# Patient Record
Sex: MALE | Race: WHITE | NOT HISPANIC OR LATINO | Employment: FULL TIME | URBAN - METROPOLITAN AREA
[De-identification: names, ages, dates, MRNs, and addresses within clinical notes are randomized per-mention and may not be internally consistent; named-entity substitution may affect disease eponyms.]

---

## 2018-03-23 ENCOUNTER — HOSPITAL ENCOUNTER (OUTPATIENT)
Dept: FAMILY MEDICINE CLINIC | Facility: CLINIC | Age: 58
Setting detail: SPECIMEN
Discharge: HOME | End: 2018-03-23
Attending: NURSE PRACTITIONER | Admitting: NURSE PRACTITIONER

## 2018-03-23 LAB
ALBUMIN SERPL-MCNC: 4.1 G/DL (ref 3.5–4.8)
ALBUMIN/GLOB SERPL: 1.5 {RATIO} (ref 1–1.7)
ALP SERPL-CCNC: 96 IU/L (ref 32–91)
ALT SERPL-CCNC: 30 IU/L (ref 17–63)
ANION GAP SERPL CALC-SCNC: 10.7 MMOL/L (ref 10–20)
AST SERPL-CCNC: 24 IU/L (ref 15–41)
BILIRUB SERPL-MCNC: 0.9 MG/DL (ref 0.3–1.2)
BUN SERPL-MCNC: 19 MG/DL (ref 8–20)
BUN/CREAT SERPL: 14.6 (ref 6.2–20.3)
CALCIUM SERPL-MCNC: 9.5 MG/DL (ref 8.9–10.3)
CHLORIDE SERPL-SCNC: 107 MMOL/L (ref 101–111)
CHOLEST SERPL-MCNC: 133 MG/DL
CHOLEST/HDLC SERPL: 4.1 {RATIO}
CONV CO2: 28 MMOL/L (ref 22–32)
CONV LDL CHOLESTEROL DIRECT: 81 MG/DL (ref 0–100)
CONV TOTAL PROTEIN: 6.9 G/DL (ref 6.1–7.9)
CREAT UR-MCNC: 1.3 MG/DL (ref 0.7–1.2)
GLOBULIN UR ELPH-MCNC: 2.8 G/DL (ref 2.5–3.8)
GLUCOSE SERPL-MCNC: 94 MG/DL (ref 65–99)
HDLC SERPL-MCNC: 33 MG/DL
LDLC/HDLC SERPL: 2.5 {RATIO}
LIPID INTERPRETATION: ABNORMAL
POTASSIUM SERPL-SCNC: 4.7 MMOL/L (ref 3.6–5.1)
SODIUM SERPL-SCNC: 141 MMOL/L (ref 136–144)
TRIGL SERPL-MCNC: 94 MG/DL
VLDLC SERPL CALC-MCNC: 19.6 MG/DL

## 2019-01-09 ENCOUNTER — HOSPITAL ENCOUNTER (OUTPATIENT)
Dept: CARDIOLOGY | Facility: HOSPITAL | Age: 59
Discharge: HOME | End: 2019-01-09
Attending: INTERNAL MEDICINE | Admitting: INTERNAL MEDICINE

## 2019-07-29 RX ORDER — CLOPIDOGREL BISULFATE 75 MG/1
75 TABLET ORAL EVERY 24 HOURS
COMMUNITY
Start: 2019-02-11 | End: 2019-07-29 | Stop reason: SDUPTHER

## 2019-07-29 RX ORDER — CLOPIDOGREL BISULFATE 75 MG/1
75 TABLET ORAL EVERY 24 HOURS
Qty: 90 TABLET | Refills: 0 | Status: SHIPPED | OUTPATIENT
Start: 2019-07-29 | End: 2019-10-25 | Stop reason: SDUPTHER

## 2019-07-29 RX ORDER — AMLODIPINE BESYLATE 2.5 MG/1
TABLET ORAL
Qty: 30 TABLET | Refills: 4 | Status: SHIPPED | OUTPATIENT
Start: 2019-07-29 | End: 2019-11-21 | Stop reason: SDUPTHER

## 2019-10-25 RX ORDER — CLOPIDOGREL BISULFATE 75 MG/1
TABLET ORAL
Qty: 15 TABLET | Refills: 0 | Status: SHIPPED | OUTPATIENT
Start: 2019-10-25 | End: 2019-11-19 | Stop reason: SDUPTHER

## 2019-10-30 RX ORDER — LISINOPRIL 10 MG/1
TABLET ORAL
Qty: 30 TABLET | Refills: 3 | OUTPATIENT
Start: 2019-10-30

## 2019-11-01 RX ORDER — LISINOPRIL 10 MG/1
10 TABLET ORAL DAILY
Qty: 30 TABLET | Refills: 0 | Status: SHIPPED | OUTPATIENT
Start: 2019-11-01 | End: 2019-11-21 | Stop reason: SDUPTHER

## 2019-11-20 RX ORDER — CLOPIDOGREL BISULFATE 75 MG/1
TABLET ORAL
Qty: 15 TABLET | Refills: 0 | Status: SHIPPED | OUTPATIENT
Start: 2019-11-20 | End: 2019-11-21 | Stop reason: SDUPTHER

## 2019-11-21 ENCOUNTER — TELEPHONE (OUTPATIENT)
Dept: CARDIOLOGY | Facility: CLINIC | Age: 59
End: 2019-11-21

## 2019-11-21 RX ORDER — LISINOPRIL 10 MG/1
10 TABLET ORAL DAILY
Qty: 30 TABLET | Refills: 0 | Status: SHIPPED | OUTPATIENT
Start: 2019-11-21 | End: 2019-11-27 | Stop reason: SDUPTHER

## 2019-11-21 RX ORDER — CLOPIDOGREL BISULFATE 75 MG/1
75 TABLET ORAL DAILY
Qty: 30 TABLET | Refills: 0 | Status: SHIPPED | OUTPATIENT
Start: 2019-11-21 | End: 2019-11-27 | Stop reason: SDUPTHER

## 2019-11-21 RX ORDER — AMLODIPINE BESYLATE 2.5 MG/1
2.5 TABLET ORAL DAILY
Qty: 30 TABLET | Refills: 0 | Status: SHIPPED | OUTPATIENT
Start: 2019-11-21 | End: 2019-11-27 | Stop reason: SDUPTHER

## 2019-11-21 NOTE — TELEPHONE ENCOUNTER
Requesting refills on lisinopril, clopidogrel, and amlodipine. [facility]. Patient made apt 11/27/19. Advised he needs to keep apt to continue medication refills, patient understood.

## 2019-11-27 ENCOUNTER — OFFICE VISIT (OUTPATIENT)
Dept: CARDIOLOGY | Facility: CLINIC | Age: 59
End: 2019-11-27

## 2019-11-27 VITALS
HEART RATE: 86 BPM | BODY MASS INDEX: 28.06 KG/M2 | HEIGHT: 70 IN | OXYGEN SATURATION: 99 % | WEIGHT: 196 LBS | SYSTOLIC BLOOD PRESSURE: 125 MMHG | DIASTOLIC BLOOD PRESSURE: 91 MMHG

## 2019-11-27 DIAGNOSIS — I20.0 UNSTABLE ANGINA PECTORIS (HCC): Primary | ICD-10-CM

## 2019-11-27 DIAGNOSIS — I10 ESSENTIAL HYPERTENSION: ICD-10-CM

## 2019-11-27 DIAGNOSIS — I25.110 CORONARY ARTERY DISEASE INVOLVING NATIVE CORONARY ARTERY OF NATIVE HEART WITH UNSTABLE ANGINA PECTORIS (HCC): ICD-10-CM

## 2019-11-27 PROCEDURE — 99214 OFFICE O/P EST MOD 30 MIN: CPT | Performed by: INTERNAL MEDICINE

## 2019-11-27 PROCEDURE — 93000 ELECTROCARDIOGRAM COMPLETE: CPT | Performed by: INTERNAL MEDICINE

## 2019-11-27 RX ORDER — SILDENAFIL 50 MG/1
TABLET, FILM COATED ORAL DAILY PRN
Status: ON HOLD | COMMUNITY
Start: 2017-01-12 | End: 2019-12-04

## 2019-11-27 RX ORDER — AMLODIPINE BESYLATE 2.5 MG/1
2.5 TABLET ORAL DAILY
Qty: 90 TABLET | Refills: 3 | Status: SHIPPED | OUTPATIENT
Start: 2019-11-27 | End: 2019-12-10 | Stop reason: HOSPADM

## 2019-11-27 RX ORDER — ATORVASTATIN CALCIUM 20 MG/1
1 TABLET, FILM COATED ORAL
COMMUNITY
Start: 2018-01-04 | End: 2019-11-27 | Stop reason: SDUPTHER

## 2019-11-27 RX ORDER — ATORVASTATIN CALCIUM 20 MG/1
20 TABLET, FILM COATED ORAL
Qty: 90 TABLET | Refills: 3 | Status: SHIPPED | OUTPATIENT
Start: 2019-11-27 | End: 2019-12-10 | Stop reason: HOSPADM

## 2019-11-27 RX ORDER — CLOPIDOGREL BISULFATE 75 MG/1
75 TABLET ORAL DAILY
Qty: 90 TABLET | Refills: 3 | Status: SHIPPED | OUTPATIENT
Start: 2019-11-27 | End: 2020-03-20 | Stop reason: SDUPTHER

## 2019-11-27 RX ORDER — ALBUTEROL SULFATE 90 UG/1
1 AEROSOL, METERED RESPIRATORY (INHALATION) DAILY PRN
COMMUNITY
Start: 2019-01-02

## 2019-11-27 RX ORDER — LISINOPRIL 10 MG/1
10 TABLET ORAL DAILY
Qty: 90 TABLET | Refills: 3 | Status: SHIPPED | OUTPATIENT
Start: 2019-11-27 | End: 2019-12-10 | Stop reason: HOSPADM

## 2019-11-27 RX ORDER — ASPIRIN 81 MG/1
1 TABLET ORAL EVERY 24 HOURS
COMMUNITY
Start: 2019-02-11

## 2019-11-27 NOTE — PROGRESS NOTES
"    Subjective:     Encounter Date:11/27/2019      Patient ID: Duke Freire is a 58 y.o. male.    Chief Complaint: CAD ,chest pain  History of Present Illness  58-year-old white male patient who recently underwent stress Myoview January 2019 which showed fixated large inferoapical defect,  cannot rule out large ischemia   patient has strong family history of CAD    patient does have history of hypertension and dyslipidemia        Patient underwent cardiac catheterization which showed severe mid LAD disease and ostial diagonal artery disease both were stented   in limited views ostial to proximal LAD has 60-70% disease but in repeat pictures it was thought it is around 40-50%     is on aspirin Plavix statins Ace inhibitors  Patient has some problems with medication noncompliance he just restarted his medications  Blood pressure is running high started having some headache blurry vision fatigue  Also in the last couple of weeks he is having increasing dyspnea on exertion chest discomfort somewhat similar to the symptoms prior to the PCI  EKG today normal sinus rhythm normal axis no significant ST-T abnormalities  I advised stress test in the near future restart all the medications aggressive medical treatment cardiac risk factor modification  Will increase amlodipine to 5 mg every day and start low-dose beta-blockers  Past Medical History:   Diagnosis Date   • Coronary artery disease      Past Surgical History:   Procedure Laterality Date   • CORONARY ANGIOPLASTY WITH STENT PLACEMENT       /91 (BP Location: Left arm, Patient Position: Sitting, Cuff Size: Adult)   Pulse 86   Ht 177.8 cm (70\")   Wt 88.9 kg (196 lb)   SpO2 99%   BMI 28.12 kg/m²   Family History   Problem Relation Age of Onset   • Diabetes Mother    • Diabetes Sister        Current Outpatient Medications:   •  amLODIPine (NORVASC) 2.5 MG tablet, Take 1 tablet by mouth Daily., Disp: 90 tablet, Rfl: 3  •  aspirin (ASPIR-LOW) 81 MG EC tablet, " Take 1 tablet by mouth Daily., Disp: , Rfl:   •  atorvastatin (LIPITOR) 20 MG tablet, Take 1 tablet by mouth every night at bedtime., Disp: 90 tablet, Rfl: 3  •  Cetirizine HCl (ZYRTEC ALLERGY) 10 MG capsule, Take 1 capsule by mouth Daily., Disp: , Rfl:   •  clopidogrel (PLAVIX) 75 MG tablet, Take 1 tablet by mouth Daily., Disp: 90 tablet, Rfl: 3  •  lisinopril (PRINIVIL,ZESTRIL) 10 MG tablet, Take 1 tablet by mouth Daily. --needs appt for more refills, Disp: 90 tablet, Rfl: 3  •  albuterol sulfate HFA (PROAIR HFA) 108 (90 Base) MCG/ACT inhaler, Daily As Needed., Disp: , Rfl:   •  sildenafil (VIAGRA) 50 MG tablet, Daily As Needed., Disp: , Rfl:   Social History     Socioeconomic History   • Marital status:      Spouse name: Not on file   • Number of children: Not on file   • Years of education: Not on file   • Highest education level: Not on file   Tobacco Use   • Smoking status: Never Smoker   • Smokeless tobacco: Never Used     No Known Allergies  Review of Systems   Constitution: Negative for chills, fever and malaise/fatigue.   HENT: Negative for congestion and hearing loss.    Eyes: Negative for double vision and visual disturbance.   Cardiovascular: Positive for chest pain, dyspnea on exertion, leg swelling and palpitations. Negative for claudication and syncope.   Respiratory: Positive for shortness of breath. Negative for cough.    Endocrine: Negative for cold intolerance.   Skin: Negative for color change and rash.   Musculoskeletal: Negative for arthritis and joint pain.   Gastrointestinal: Negative for abdominal pain and heartburn.   Genitourinary: Negative for hematuria.   Neurological: Positive for dizziness and light-headedness. Negative for excessive daytime sleepiness and numbness.   Psychiatric/Behavioral: Negative for depression. The patient is not nervous/anxious.    All other systems reviewed and are negative.             Objective:     Physical Exam   Constitutional: He is oriented to  person, place, and time. He appears well-developed and well-nourished. He is cooperative.   HENT:   Head: Normocephalic and atraumatic.   Mouth/Throat: Uvula is midline and oropharynx is clear and moist. No oral lesions.   Eyes: Conjunctivae are normal. No scleral icterus.   Neck: Trachea normal. Neck supple. No JVD present. Carotid bruit is not present. No thyromegaly present.   Cardiovascular: Normal rate, regular rhythm, S1 normal, S2 normal, normal heart sounds, intact distal pulses and normal pulses. PMI is not displaced. Exam reveals no gallop and no friction rub.   No murmur heard.  Pulmonary/Chest: Effort normal and breath sounds normal.   Abdominal: Soft. Bowel sounds are normal.   Musculoskeletal: Normal range of motion.   Neurological: He is alert and oriented to person, place, and time. He has normal strength.   No focal deficits   Skin: Skin is warm. No cyanosis.   Psychiatric: He has a normal mood and affect.         ECG 12 Lead  Date/Time: 11/27/2019 5:58 PM  Performed by: James Hernandez MD  Authorized by: James Hernandez MD   Comments: EKG today normal sinus rhythm normal axis no significant ST-T abnormalities compared to the last EKG no significant changes            Lab Review:       Assessment:          Diagnosis Plan   1. Unstable angina pectoris (CMS/HCC)  Stress Test With Myocardial Perfusion One Day   2. Coronary artery disease involving native coronary artery of native heart with unstable angina pectoris (CMS/HCC)  Stress Test With Myocardial Perfusion One Day   3. Essential hypertension  Stress Test With Myocardial Perfusion One Day          Plan:         We will schedule a stress test and restart all his medications if patient has any worsening symptoms he will contact me or come to the ER  CAD needs aggressive medical treatment risk factor modification status post PCI  Hypertension needs to be aggressively controlled will increase amlodipine to 5 mg every day start  low-dose beta-blockers

## 2019-12-02 ENCOUNTER — TELEPHONE (OUTPATIENT)
Dept: CARDIOLOGY | Facility: CLINIC | Age: 59
End: 2019-12-02

## 2019-12-02 ENCOUNTER — HOSPITAL ENCOUNTER (OUTPATIENT)
Dept: CARDIOLOGY | Facility: HOSPITAL | Age: 59
Discharge: HOME | End: 2019-12-02

## 2019-12-02 ENCOUNTER — HOSPITAL ENCOUNTER (OUTPATIENT)
Dept: CARDIOLOGY | Facility: HOSPITAL | Age: 59
Discharge: HOME | End: 2019-12-02
Admitting: INTERNAL MEDICINE

## 2019-12-02 DIAGNOSIS — I25.110 CORONARY ARTERY DISEASE INVOLVING NATIVE CORONARY ARTERY OF NATIVE HEART WITH UNSTABLE ANGINA PECTORIS (HCC): ICD-10-CM

## 2019-12-02 DIAGNOSIS — I10 ESSENTIAL HYPERTENSION: ICD-10-CM

## 2019-12-02 DIAGNOSIS — I20.9 ANGINA PECTORIS (HCC): ICD-10-CM

## 2019-12-02 DIAGNOSIS — R94.39 ABNORMAL NUCLEAR STRESS TEST: Primary | ICD-10-CM

## 2019-12-02 DIAGNOSIS — I20.0 UNSTABLE ANGINA PECTORIS (HCC): ICD-10-CM

## 2019-12-02 LAB
BH CV STRESS COMMENTS STAGE 1: NORMAL
BH CV STRESS DOSE REGADENOSON STAGE 1: 0.4
BH CV STRESS DURATION MIN STAGE 1: 0
BH CV STRESS DURATION SEC STAGE 1: 10
BH CV STRESS PROTOCOL 1: NORMAL
BH CV STRESS RECOVERY BP: NORMAL MMHG
BH CV STRESS RECOVERY HR: 82 BPM
BH CV STRESS STAGE 1: 1
LV EF NUC BP: 58 %
MAXIMAL PREDICTED HEART RATE: 163 BPM
STRESS BASELINE BP: NORMAL MMHG
STRESS BASELINE HR: 52 BPM
STRESS TARGET HR: 139 BPM

## 2019-12-02 PROCEDURE — 0 TECHNETIUM SESTAMIBI: Performed by: INTERNAL MEDICINE

## 2019-12-02 PROCEDURE — 78452 HT MUSCLE IMAGE SPECT MULT: CPT

## 2019-12-02 PROCEDURE — 93018 CV STRESS TEST I&R ONLY: CPT | Performed by: INTERNAL MEDICINE

## 2019-12-02 PROCEDURE — A9500 TC99M SESTAMIBI: HCPCS | Performed by: INTERNAL MEDICINE

## 2019-12-02 PROCEDURE — 25010000002 REGADENOSON 0.4 MG/5ML SOLUTION: Performed by: INTERNAL MEDICINE

## 2019-12-02 PROCEDURE — 93016 CV STRESS TEST SUPVJ ONLY: CPT | Performed by: INTERNAL MEDICINE

## 2019-12-02 PROCEDURE — 93017 CV STRESS TEST TRACING ONLY: CPT

## 2019-12-02 PROCEDURE — 78452 HT MUSCLE IMAGE SPECT MULT: CPT | Performed by: INTERNAL MEDICINE

## 2019-12-02 RX ADMIN — REGADENOSON 0.4 MG: 0.08 INJECTION, SOLUTION INTRAVENOUS at 10:45

## 2019-12-02 RX ADMIN — TECHNETIUM TC 99M SESTAMIBI 1 DOSE: 1 INJECTION INTRAVENOUS at 09:45

## 2019-12-02 NOTE — TELEPHONE ENCOUNTER
Dr. Leal has scheduled the patient for a cath on the 4th at 5:30. He would like him to arrive at 3:30 for a lab. Please contact patient with instructions.

## 2019-12-04 ENCOUNTER — LAB (OUTPATIENT)
Dept: LAB | Facility: HOSPITAL | Age: 59
End: 2019-12-04

## 2019-12-04 ENCOUNTER — HOSPITAL ENCOUNTER (OUTPATIENT)
Dept: CARDIOLOGY | Facility: HOSPITAL | Age: 59
Discharge: HOME | End: 2019-12-04

## 2019-12-04 ENCOUNTER — HOSPITAL ENCOUNTER (INPATIENT)
Facility: HOSPITAL | Age: 59
LOS: 5 days | Discharge: HOME | End: 2019-12-10
Attending: INTERNAL MEDICINE | Admitting: INTERNAL MEDICINE

## 2019-12-04 DIAGNOSIS — I25.110 CORONARY ARTERY DISEASE INVOLVING NATIVE CORONARY ARTERY OF NATIVE HEART WITH UNSTABLE ANGINA PECTORIS (HCC): ICD-10-CM

## 2019-12-04 DIAGNOSIS — N17.9 ACUTE RENAL FAILURE, UNSPECIFIED ACUTE RENAL FAILURE TYPE (HCC): Primary | ICD-10-CM

## 2019-12-04 DIAGNOSIS — R94.39 ABNORMAL NUCLEAR STRESS TEST: ICD-10-CM

## 2019-12-04 DIAGNOSIS — I20.9 ANGINA PECTORIS (HCC): ICD-10-CM

## 2019-12-04 DIAGNOSIS — Z95.1 S/P CABG X 2: ICD-10-CM

## 2019-12-04 DIAGNOSIS — I10 ESSENTIAL HYPERTENSION: ICD-10-CM

## 2019-12-04 LAB
ANION GAP SERPL CALCULATED.3IONS-SCNC: 8 MMOL/L (ref 5–15)
BUN BLD-MCNC: 25 MG/DL (ref 6–20)
BUN/CREAT SERPL: 17.5 (ref 7–25)
CALCIUM SPEC-SCNC: 9.4 MG/DL (ref 8.6–10.5)
CHLORIDE SERPL-SCNC: 105 MMOL/L (ref 98–107)
CO2 SERPL-SCNC: 29 MMOL/L (ref 22–29)
CREAT BLD-MCNC: 1.43 MG/DL (ref 0.76–1.27)
DEPRECATED RDW RBC AUTO: 44.2 FL (ref 37–54)
ERYTHROCYTE [DISTWIDTH] IN BLOOD BY AUTOMATED COUNT: 13.5 % (ref 12.3–15.4)
GFR SERPL CREATININE-BSD FRML MDRD: 51 ML/MIN/1.73
GLUCOSE BLD-MCNC: 96 MG/DL (ref 65–99)
HCT VFR BLD AUTO: 39.5 % (ref 37.5–51)
HGB BLD-MCNC: 13.9 G/DL (ref 13–17.7)
INR PPP: 1.1 (ref 0.9–1.1)
MCH RBC QN AUTO: 32.7 PG (ref 26.6–33)
MCHC RBC AUTO-ENTMCNC: 35.2 G/DL (ref 31.5–35.7)
MCV RBC AUTO: 93 FL (ref 79–97)
PLATELET # BLD AUTO: 216 10*3/MM3 (ref 140–450)
PMV BLD AUTO: 7.6 FL (ref 6–12)
POTASSIUM BLD-SCNC: 4.6 MMOL/L (ref 3.5–5.2)
PROTHROMBIN TIME: 11.3 SECONDS (ref 9.6–11.7)
RBC # BLD AUTO: 4.24 10*6/MM3 (ref 4.14–5.8)
SODIUM BLD-SCNC: 142 MMOL/L (ref 136–145)
WBC NRBC COR # BLD: 6 10*3/MM3 (ref 3.4–10.8)

## 2019-12-04 PROCEDURE — C1894 INTRO/SHEATH, NON-LASER: HCPCS | Performed by: INTERNAL MEDICINE

## 2019-12-04 PROCEDURE — 25010000002 MIDAZOLAM PER 1 MG: Performed by: INTERNAL MEDICINE

## 2019-12-04 PROCEDURE — 4A023N7 MEASUREMENT OF CARDIAC SAMPLING AND PRESSURE, LEFT HEART, PERCUTANEOUS APPROACH: ICD-10-PCS | Performed by: INTERNAL MEDICINE

## 2019-12-04 PROCEDURE — 85610 PROTHROMBIN TIME: CPT

## 2019-12-04 PROCEDURE — 93458 L HRT ARTERY/VENTRICLE ANGIO: CPT | Performed by: INTERNAL MEDICINE

## 2019-12-04 PROCEDURE — 93005 ELECTROCARDIOGRAM TRACING: CPT | Performed by: INTERNAL MEDICINE

## 2019-12-04 PROCEDURE — 25010000002 FENTANYL CITRATE (PF) 100 MCG/2ML SOLUTION: Performed by: INTERNAL MEDICINE

## 2019-12-04 PROCEDURE — 0 IOPAMIDOL PER 1 ML: Performed by: INTERNAL MEDICINE

## 2019-12-04 PROCEDURE — C1769 GUIDE WIRE: HCPCS | Performed by: INTERNAL MEDICINE

## 2019-12-04 PROCEDURE — 36415 COLL VENOUS BLD VENIPUNCTURE: CPT

## 2019-12-04 PROCEDURE — 83036 HEMOGLOBIN GLYCOSYLATED A1C: CPT | Performed by: NURSE PRACTITIONER

## 2019-12-04 PROCEDURE — 99152 MOD SED SAME PHYS/QHP 5/>YRS: CPT | Performed by: INTERNAL MEDICINE

## 2019-12-04 PROCEDURE — 99024 POSTOP FOLLOW-UP VISIT: CPT | Performed by: THORACIC SURGERY (CARDIOTHORACIC VASCULAR SURGERY)

## 2019-12-04 PROCEDURE — B2111ZZ FLUOROSCOPY OF MULTIPLE CORONARY ARTERIES USING LOW OSMOLAR CONTRAST: ICD-10-PCS | Performed by: INTERNAL MEDICINE

## 2019-12-04 PROCEDURE — 25010000002 ONDANSETRON PER 1 MG: Performed by: INTERNAL MEDICINE

## 2019-12-04 PROCEDURE — 85027 COMPLETE CBC AUTOMATED: CPT

## 2019-12-04 PROCEDURE — 99153 MOD SED SAME PHYS/QHP EA: CPT | Performed by: INTERNAL MEDICINE

## 2019-12-04 PROCEDURE — 80048 BASIC METABOLIC PNL TOTAL CA: CPT

## 2019-12-04 RX ORDER — METOPROLOL SUCCINATE 25 MG/1
25 TABLET, EXTENDED RELEASE ORAL NIGHTLY
COMMUNITY
End: 2019-12-10 | Stop reason: HOSPADM

## 2019-12-04 RX ORDER — ATORVASTATIN CALCIUM 20 MG/1
20 TABLET, FILM COATED ORAL
Qty: 90 TABLET | Refills: 3 | OUTPATIENT
Start: 2019-12-04

## 2019-12-04 RX ORDER — LIDOCAINE HYDROCHLORIDE 20 MG/ML
INJECTION, SOLUTION INFILTRATION; PERINEURAL AS NEEDED
Status: DISCONTINUED | OUTPATIENT
Start: 2019-12-04 | End: 2019-12-04 | Stop reason: HOSPADM

## 2019-12-04 RX ORDER — FENTANYL CITRATE 50 UG/ML
INJECTION, SOLUTION INTRAMUSCULAR; INTRAVENOUS AS NEEDED
Status: DISCONTINUED | OUTPATIENT
Start: 2019-12-04 | End: 2019-12-04 | Stop reason: HOSPADM

## 2019-12-04 RX ORDER — MIDAZOLAM HYDROCHLORIDE 1 MG/ML
INJECTION INTRAMUSCULAR; INTRAVENOUS AS NEEDED
Status: DISCONTINUED | OUTPATIENT
Start: 2019-12-04 | End: 2019-12-04 | Stop reason: HOSPADM

## 2019-12-04 RX ORDER — ONDANSETRON 2 MG/ML
INJECTION INTRAMUSCULAR; INTRAVENOUS AS NEEDED
Status: DISCONTINUED | OUTPATIENT
Start: 2019-12-04 | End: 2019-12-04 | Stop reason: HOSPADM

## 2019-12-05 ENCOUNTER — HOSPITAL ENCOUNTER (OUTPATIENT)
Dept: CARDIOLOGY | Facility: HOSPITAL | Age: 59
Setting detail: OBSERVATION
End: 2019-12-05

## 2019-12-05 ENCOUNTER — APPOINTMENT (OUTPATIENT)
Dept: GENERAL RADIOLOGY | Facility: HOSPITAL | Age: 59
End: 2019-12-05

## 2019-12-05 ENCOUNTER — HOSPITAL ENCOUNTER (OUTPATIENT)
Dept: CARDIOLOGY | Facility: HOSPITAL | Age: 59
Setting detail: OBSERVATION
Discharge: HOME | End: 2019-12-05

## 2019-12-05 ENCOUNTER — APPOINTMENT (OUTPATIENT)
Dept: RESPIRATORY THERAPY | Facility: HOSPITAL | Age: 59
End: 2019-12-05

## 2019-12-05 LAB
ABO GROUP BLD: NORMAL
ALBUMIN SERPL-MCNC: 3.9 G/DL (ref 3.5–5.2)
ALBUMIN/GLOB SERPL: 1.5 G/DL
ALP SERPL-CCNC: 91 U/L (ref 39–117)
ALT SERPL W P-5'-P-CCNC: 21 U/L (ref 1–41)
ANION GAP SERPL CALCULATED.3IONS-SCNC: 9 MMOL/L (ref 5–15)
APTT PPP: 30.1 SECONDS (ref 24–31)
AST SERPL-CCNC: 17 U/L (ref 1–40)
BASOPHILS # BLD AUTO: 0 10*3/MM3 (ref 0–0.2)
BASOPHILS NFR BLD AUTO: 0.7 % (ref 0–1.5)
BH CV XLRA MEAS - DIST GSV CALF DIST LEFT: 0.23 CM
BH CV XLRA MEAS - DIST GSV CALF DIST RIGHT: 0.2 CM
BH CV XLRA MEAS - DIST GSV THIGH DIST LEFT: 0.47 CM
BH CV XLRA MEAS - DIST GSV THIGH DIST RIGHT: 0.36 CM
BH CV XLRA MEAS - MID GSV CALF LEFT: 0.22 CM
BH CV XLRA MEAS - MID GSV CALF RIGHT: 0.22 CM
BH CV XLRA MEAS - MID GSV THIGH  LEFT: 0.43 CM
BH CV XLRA MEAS - MID GSV THIGH  RIGHT: 0.32 CM
BH CV XLRA MEAS - PROX GSV CALF DIST LEFT: 0.28 CM
BH CV XLRA MEAS - PROX GSV CALF DIST RIGHT: 0.25 CM
BH CV XLRA MEAS - PROX GSV THIGH  LEFT: 0.44 CM
BH CV XLRA MEAS - PROX GSV THIGH  RIGHT: 0.41 CM
BH CV XLRA MEAS LEFT DIST CCA EDV: -30.4 CM/SEC
BH CV XLRA MEAS LEFT DIST CCA PSV: -101 CM/SEC
BH CV XLRA MEAS LEFT DIST ICA EDV: -32 CM/SEC
BH CV XLRA MEAS LEFT DIST ICA PSV: -92.6 CM/SEC
BH CV XLRA MEAS LEFT ICA/CCA RATIO: 0.9
BH CV XLRA MEAS LEFT PROX CCA EDV: 24.9 CM/SEC
BH CV XLRA MEAS LEFT PROX CCA PSV: 104 CM/SEC
BH CV XLRA MEAS LEFT PROX ECA EDV: -12.4 CM/SEC
BH CV XLRA MEAS LEFT PROX ECA PSV: -98.8 CM/SEC
BH CV XLRA MEAS LEFT PROX ICA EDV: -23.7 CM/SEC
BH CV XLRA MEAS LEFT PROX ICA PSV: -79.9 CM/SEC
BH CV XLRA MEAS LEFT PROX SCLA PSV: 99.4 CM/SEC
BH CV XLRA MEAS LEFT VERTEBRAL A EDV: 10.1 CM/SEC
BH CV XLRA MEAS LEFT VERTEBRAL A PSV: 49.5 CM/SEC
BH CV XLRA MEAS RIGHT BULB EDV: -25.5 CM/SEC
BH CV XLRA MEAS RIGHT BULB PSV: -82 CM/SEC
BH CV XLRA MEAS RIGHT DIST CCA EDV: 22 CM/SEC
BH CV XLRA MEAS RIGHT DIST CCA PSV: 71.1 CM/SEC
BH CV XLRA MEAS RIGHT DIST ICA EDV: -31.5 CM/SEC
BH CV XLRA MEAS RIGHT DIST ICA PSV: -75.7 CM/SEC
BH CV XLRA MEAS RIGHT ICA/CCA RATIO: 1
BH CV XLRA MEAS RIGHT PROX CCA EDV: 29.4 CM/SEC
BH CV XLRA MEAS RIGHT PROX CCA PSV: 88.7 CM/SEC
BH CV XLRA MEAS RIGHT PROX ECA EDV: -18.2 CM/SEC
BH CV XLRA MEAS RIGHT PROX ECA PSV: -89.9 CM/SEC
BH CV XLRA MEAS RIGHT PROX ICA EDV: -37.8 CM/SEC
BH CV XLRA MEAS RIGHT PROX ICA PSV: -91.4 CM/SEC
BH CV XLRA MEAS RIGHT PROX SCLA PSV: 96.9 CM/SEC
BH CV XLRA MEAS RIGHT VERTEBRAL A EDV: -10.7 CM/SEC
BH CV XLRA MEAS RIGHT VERTEBRAL A PSV: -42.6 CM/SEC
BILIRUB SERPL-MCNC: 0.5 MG/DL (ref 0.2–1.2)
BILIRUB UR QL STRIP: NEGATIVE
BLD GP AB SCN SERPL QL: NEGATIVE
BUN BLD-MCNC: 22 MG/DL (ref 6–20)
BUN/CREAT SERPL: 20 (ref 7–25)
CALCIUM SPEC-SCNC: 8.9 MG/DL (ref 8.6–10.5)
CHLORIDE SERPL-SCNC: 106 MMOL/L (ref 98–107)
CHOLEST SERPL-MCNC: 152 MG/DL (ref 0–200)
CK SERPL-CCNC: 63 U/L (ref 20–200)
CLARITY UR: CLEAR
CLOSE TME COLL+ADP + EPINEP PNL BLD: 53 % (ref 86–100)
CO2 SERPL-SCNC: 25 MMOL/L (ref 22–29)
COLOR UR: YELLOW
CREAT BLD-MCNC: 1.1 MG/DL (ref 0.76–1.27)
DEPRECATED RDW RBC AUTO: 44.2 FL (ref 37–54)
EOSINOPHIL # BLD AUTO: 0.1 10*3/MM3 (ref 0–0.4)
EOSINOPHIL NFR BLD AUTO: 1.7 % (ref 0.3–6.2)
EOSINOPHIL SPEC QL MICRO: 0 % EOS/100 CELLS (ref 0–0)
ERYTHROCYTE [DISTWIDTH] IN BLOOD BY AUTOMATED COUNT: 13.4 % (ref 12.3–15.4)
GFR SERPL CREATININE-BSD FRML MDRD: 69 ML/MIN/1.73
GLOBULIN UR ELPH-MCNC: 2.6 GM/DL
GLUCOSE BLD-MCNC: 94 MG/DL (ref 65–99)
GLUCOSE BLDC GLUCOMTR-MCNC: 86 MG/DL (ref 70–105)
GLUCOSE UR STRIP-MCNC: NEGATIVE MG/DL
HBA1C MFR BLD: 4.5 % (ref 3.5–5.6)
HCT VFR BLD AUTO: 40.2 % (ref 37.5–51)
HDLC SERPL-MCNC: 32 MG/DL (ref 40–60)
HGB BLD-MCNC: 14.5 G/DL (ref 13–17.7)
HGB UR QL STRIP.AUTO: NEGATIVE
INR PPP: 1.06 (ref 0.9–1.1)
KETONES UR QL STRIP: NEGATIVE
LDLC SERPL CALC-MCNC: 90 MG/DL (ref 0–100)
LDLC/HDLC SERPL: 2.81 {RATIO}
LEUKOCYTE ESTERASE UR QL STRIP.AUTO: NEGATIVE
LYMPHOCYTES # BLD AUTO: 1.7 10*3/MM3 (ref 0.7–3.1)
LYMPHOCYTES NFR BLD AUTO: 25.4 % (ref 19.6–45.3)
MCH RBC QN AUTO: 33.5 PG (ref 26.6–33)
MCHC RBC AUTO-ENTMCNC: 36.1 G/DL (ref 31.5–35.7)
MCV RBC AUTO: 92.7 FL (ref 79–97)
MONOCYTES # BLD AUTO: 0.4 10*3/MM3 (ref 0.1–0.9)
MONOCYTES NFR BLD AUTO: 6.1 % (ref 5–12)
NEUTROPHILS # BLD AUTO: 4.3 10*3/MM3 (ref 1.7–7)
NEUTROPHILS NFR BLD AUTO: 66.1 % (ref 42.7–76)
NITRITE UR QL STRIP: NEGATIVE
NRBC BLD AUTO-RTO: 0 /100 WBC (ref 0–0.2)
PH UR STRIP.AUTO: 6.5 [PH] (ref 5–8)
PLATELET # BLD AUTO: 203 10*3/MM3 (ref 140–450)
PMV BLD AUTO: 8.1 FL (ref 6–12)
POTASSIUM BLD-SCNC: 4.6 MMOL/L (ref 3.5–5.2)
PROT SERPL-MCNC: 6.5 G/DL (ref 6–8.5)
PROT UR QL STRIP: NEGATIVE
PROTHROMBIN TIME: 11 SECONDS (ref 9.6–11.7)
RBC # BLD AUTO: 4.33 10*6/MM3 (ref 4.14–5.8)
RH BLD: POSITIVE
SODIUM BLD-SCNC: 140 MMOL/L (ref 136–145)
SODIUM UR-SCNC: 94 MMOL/L
SP GR UR STRIP: 1.01 (ref 1–1.03)
T&S EXPIRATION DATE: NORMAL
TRIGL SERPL-MCNC: 150 MG/DL (ref 0–150)
URATE SERPL-MCNC: 5 MG/DL (ref 3.4–7)
UROBILINOGEN UR QL STRIP: NORMAL
VLDLC SERPL-MCNC: 30 MG/DL
WBC NRBC COR # BLD: 6.5 10*3/MM3 (ref 3.4–10.8)

## 2019-12-05 PROCEDURE — 94010 BREATHING CAPACITY TEST: CPT

## 2019-12-05 PROCEDURE — 84550 ASSAY OF BLOOD/URIC ACID: CPT | Performed by: INTERNAL MEDICINE

## 2019-12-05 PROCEDURE — 87205 SMEAR GRAM STAIN: CPT | Performed by: INTERNAL MEDICINE

## 2019-12-05 PROCEDURE — 80061 LIPID PANEL: CPT | Performed by: NURSE PRACTITIONER

## 2019-12-05 PROCEDURE — 86923 COMPATIBILITY TEST ELECTRIC: CPT

## 2019-12-05 PROCEDURE — 85610 PROTHROMBIN TIME: CPT | Performed by: NURSE PRACTITIONER

## 2019-12-05 PROCEDURE — G0378 HOSPITAL OBSERVATION PER HR: HCPCS

## 2019-12-05 PROCEDURE — 99232 SBSQ HOSP IP/OBS MODERATE 35: CPT | Performed by: INTERNAL MEDICINE

## 2019-12-05 PROCEDURE — 86900 BLOOD TYPING SEROLOGIC ABO: CPT

## 2019-12-05 PROCEDURE — 85025 COMPLETE CBC W/AUTO DIFF WBC: CPT | Performed by: NURSE PRACTITIONER

## 2019-12-05 PROCEDURE — 84300 ASSAY OF URINE SODIUM: CPT | Performed by: INTERNAL MEDICINE

## 2019-12-05 PROCEDURE — 85730 THROMBOPLASTIN TIME PARTIAL: CPT | Performed by: NURSE PRACTITIONER

## 2019-12-05 PROCEDURE — 94727 GAS DIL/WSHOT DETER LNG VOL: CPT

## 2019-12-05 PROCEDURE — 85576 BLOOD PLATELET AGGREGATION: CPT | Performed by: THORACIC SURGERY (CARDIOTHORACIC VASCULAR SURGERY)

## 2019-12-05 PROCEDURE — 86901 BLOOD TYPING SEROLOGIC RH(D): CPT | Performed by: NURSE PRACTITIONER

## 2019-12-05 PROCEDURE — 80053 COMPREHEN METABOLIC PANEL: CPT | Performed by: THORACIC SURGERY (CARDIOTHORACIC VASCULAR SURGERY)

## 2019-12-05 PROCEDURE — 86850 RBC ANTIBODY SCREEN: CPT | Performed by: NURSE PRACTITIONER

## 2019-12-05 PROCEDURE — 86901 BLOOD TYPING SEROLOGIC RH(D): CPT

## 2019-12-05 PROCEDURE — 87081 CULTURE SCREEN ONLY: CPT | Performed by: THORACIC SURGERY (CARDIOTHORACIC VASCULAR SURGERY)

## 2019-12-05 PROCEDURE — 99254 IP/OBS CNSLTJ NEW/EST MOD 60: CPT | Performed by: NURSE PRACTITIONER

## 2019-12-05 PROCEDURE — 82962 GLUCOSE BLOOD TEST: CPT

## 2019-12-05 PROCEDURE — 82550 ASSAY OF CK (CPK): CPT | Performed by: INTERNAL MEDICINE

## 2019-12-05 PROCEDURE — 94729 DIFFUSING CAPACITY: CPT

## 2019-12-05 PROCEDURE — 93970 EXTREMITY STUDY: CPT

## 2019-12-05 PROCEDURE — 86900 BLOOD TYPING SEROLOGIC ABO: CPT | Performed by: NURSE PRACTITIONER

## 2019-12-05 PROCEDURE — 93880 EXTRACRANIAL BILAT STUDY: CPT

## 2019-12-05 PROCEDURE — 81003 URINALYSIS AUTO W/O SCOPE: CPT | Performed by: NURSE PRACTITIONER

## 2019-12-05 PROCEDURE — 71045 X-RAY EXAM CHEST 1 VIEW: CPT

## 2019-12-05 RX ORDER — ALPRAZOLAM 0.25 MG/1
0.25 TABLET ORAL ONCE
Status: COMPLETED | OUTPATIENT
Start: 2019-12-05 | End: 2019-12-05

## 2019-12-05 RX ORDER — CHLORHEXIDINE GLUCONATE 500 MG/1
1 CLOTH TOPICAL EVERY 12 HOURS
Status: COMPLETED | OUTPATIENT
Start: 2019-12-05 | End: 2019-12-06

## 2019-12-05 RX ORDER — DIPHENHYDRAMINE HCL 25 MG
25 TABLET ORAL EVERY 6 HOURS PRN
Status: DISCONTINUED | OUTPATIENT
Start: 2019-12-05 | End: 2019-12-06 | Stop reason: HOSPADM

## 2019-12-05 RX ORDER — SODIUM CHLORIDE 9 MG/ML
60 INJECTION, SOLUTION INTRAVENOUS CONTINUOUS
Status: DISCONTINUED | OUTPATIENT
Start: 2019-12-05 | End: 2019-12-06

## 2019-12-05 RX ORDER — HYDRALAZINE HYDROCHLORIDE 20 MG/ML
10 INJECTION INTRAMUSCULAR; INTRAVENOUS EVERY 6 HOURS PRN
Status: DISCONTINUED | OUTPATIENT
Start: 2019-12-05 | End: 2019-12-06 | Stop reason: HOSPADM

## 2019-12-05 RX ORDER — ATORVASTATIN CALCIUM 20 MG/1
20 TABLET, FILM COATED ORAL DAILY
Status: DISCONTINUED | OUTPATIENT
Start: 2019-12-05 | End: 2019-12-06 | Stop reason: HOSPADM

## 2019-12-05 RX ORDER — ONDANSETRON 4 MG/1
4 TABLET, FILM COATED ORAL EVERY 6 HOURS PRN
Status: DISCONTINUED | OUTPATIENT
Start: 2019-12-05 | End: 2019-12-06 | Stop reason: HOSPADM

## 2019-12-05 RX ORDER — ALUMINA, MAGNESIA, AND SIMETHICONE 2400; 2400; 240 MG/30ML; MG/30ML; MG/30ML
15 SUSPENSION ORAL EVERY 6 HOURS PRN
Status: DISCONTINUED | OUTPATIENT
Start: 2019-12-05 | End: 2019-12-05

## 2019-12-05 RX ORDER — ATROPINE SULFATE 1 MG/ML
.5-1 INJECTION, SOLUTION INTRAMUSCULAR; INTRAVENOUS; SUBCUTANEOUS
Status: DISCONTINUED | OUTPATIENT
Start: 2019-12-05 | End: 2019-12-06 | Stop reason: HOSPADM

## 2019-12-05 RX ORDER — CHLORHEXIDINE GLUCONATE 0.12 MG/ML
15 RINSE ORAL EVERY 12 HOURS SCHEDULED
Status: COMPLETED | OUTPATIENT
Start: 2019-12-05 | End: 2019-12-06

## 2019-12-05 RX ORDER — ASPIRIN 81 MG/1
81 TABLET ORAL DAILY
Status: DISCONTINUED | OUTPATIENT
Start: 2019-12-05 | End: 2019-12-06 | Stop reason: HOSPADM

## 2019-12-05 RX ORDER — AMLODIPINE BESYLATE 5 MG/1
5 TABLET ORAL DAILY
Status: DISCONTINUED | OUTPATIENT
Start: 2019-12-05 | End: 2019-12-06

## 2019-12-05 RX ORDER — ACETAMINOPHEN 325 MG/1
650 TABLET ORAL EVERY 4 HOURS PRN
Status: DISCONTINUED | OUTPATIENT
Start: 2019-12-05 | End: 2019-12-06 | Stop reason: HOSPADM

## 2019-12-05 RX ORDER — SODIUM CHLORIDE 9 MG/ML
250 INJECTION, SOLUTION INTRAVENOUS ONCE AS NEEDED
Status: COMPLETED | OUTPATIENT
Start: 2019-12-05 | End: 2019-12-06

## 2019-12-05 RX ORDER — ONDANSETRON 2 MG/ML
4 INJECTION INTRAMUSCULAR; INTRAVENOUS EVERY 6 HOURS PRN
Status: DISCONTINUED | OUTPATIENT
Start: 2019-12-05 | End: 2019-12-06 | Stop reason: HOSPADM

## 2019-12-05 RX ORDER — METOPROLOL SUCCINATE 25 MG/1
25 TABLET, EXTENDED RELEASE ORAL NIGHTLY
Status: DISCONTINUED | OUTPATIENT
Start: 2019-12-05 | End: 2019-12-06 | Stop reason: HOSPADM

## 2019-12-05 RX ORDER — ALPRAZOLAM 0.5 MG/1
0.5 TABLET ORAL EVERY 8 HOURS PRN
Status: DISCONTINUED | OUTPATIENT
Start: 2019-12-05 | End: 2019-12-06 | Stop reason: HOSPADM

## 2019-12-05 RX ADMIN — AMLODIPINE BESYLATE 5 MG: 5 TABLET ORAL at 10:00

## 2019-12-05 RX ADMIN — CHLORHEXIDINE GLUCONATE 0.12% ORAL RINSE 15 ML: 1.2 LIQUID ORAL at 21:10

## 2019-12-05 RX ADMIN — ASPIRIN 81 MG: 81 TABLET, DELAYED RELEASE ORAL at 10:00

## 2019-12-05 RX ADMIN — ATORVASTATIN CALCIUM 20 MG: 20 TABLET, FILM COATED ORAL at 09:55

## 2019-12-05 RX ADMIN — ALPRAZOLAM 0.25 MG: 0.25 TABLET ORAL at 12:29

## 2019-12-05 RX ADMIN — SODIUM CHLORIDE 60 ML/HR: 0.9 INJECTION, SOLUTION INTRAVENOUS at 15:30

## 2019-12-05 RX ADMIN — MUPIROCIN 1 APPLICATION: 20 OINTMENT TOPICAL at 21:10

## 2019-12-05 RX ADMIN — METOPROLOL SUCCINATE 25 MG: 25 TABLET, EXTENDED RELEASE ORAL at 21:10

## 2019-12-05 RX ADMIN — CHLORHEXIDINE GLUCONATE 1 APPLICATION: 500 CLOTH TOPICAL at 21:11

## 2019-12-05 RX ADMIN — ALPRAZOLAM 0.5 MG: 0.5 TABLET ORAL at 19:27

## 2019-12-05 RX ADMIN — ACETAMINOPHEN 650 MG: 325 TABLET ORAL at 07:06

## 2019-12-05 NOTE — PROGRESS NOTES
I spoke with Dr. Hernandez regarding Mr. Freire.  Left heart catheterization images reviewed with him over the phone (I was able to view the images at [facility] on epic).    58-year-old gentleman with a recent diagonal stent.  Unfortunately he returns with in-stent stenosis that has also compromised the LAD.  Apparently he is having unstable anginal symptoms.    Although he could undergo a PCI to his LAD system I think there is a excellent argument to be made for surgical revascularization, specifically a LIMA to his LAD and a saphenous vein graft to his diagonal branch.  If the patient is agreeable we will begin work-up tomorrow and do his surgery this admission.  He is on Plavix.    Full consult to follow.

## 2019-12-05 NOTE — CONSULTS
NEPHROLOGY CONSULTATION-----[facility]    [facility]  [phone]  Luis Goff MD    Patient Care Team:  Sandra Cha PA as PCP - James Lopze MD as Consulting Physician (Cardiology)  Marilyn Goff MD as Consulting Physician (Nephrology)    CC/REASON FOR CONSULTATION: RENAL FAILURE/ELEVATED SERUM CREATININE  PHYSICIAN REQUESTING CONSULTATION:  (CT SURGERY)    History of Present Illness     HPI    Patient is a 58 y.o. WM whom I was asked to see in consultation for evaluation and management of renal failure/elevated serum creatinine. Patient was admitted with  CP and s/p cardiac cath last night.  Patient denies prior knowledge of functional kidney disease, proteinuria, or hematuria. No NSAIDs. S/P IV dye exposure with cardiac cath last night. No known h/o hepatitis, TB, rheumatic fever, jaundice, SLE, bleeding/bruising disorders.  No urinary sx. No edema or fluid retention.  +Compliance with home meds. Was on diuretics in the form of   prior to admission. Was on ACE-I/ARB in the form of Lisinopril prior to admission. No herbal med use.      Review of Systems   Constitutional: Positive for activity change and fatigue. Negative for appetite change, chills, diaphoresis, fever and unexpected weight change.   HENT: Negative for congestion, dental problem, drooling, ear discharge, ear pain, facial swelling, hearing loss, mouth sores, nosebleeds, postnasal drip, rhinorrhea, sinus pressure, sinus pain, sneezing, sore throat, tinnitus, trouble swallowing and voice change.    Eyes: Negative for photophobia, pain, discharge, redness, itching and visual disturbance.   Respiratory: Negative for apnea, cough, choking, chest tightness, shortness of breath, wheezing and stridor.    Cardiovascular: Positive for chest pain. Negative for palpitations and leg swelling.   Gastrointestinal: Negative for abdominal distention, abdominal pain,  anal bleeding, blood in stool, constipation, diarrhea, nausea, rectal pain and vomiting.   Endocrine: Negative for cold intolerance, heat intolerance, polydipsia, polyphagia and polyuria.   Genitourinary: Negative for decreased urine volume, difficulty urinating, dysuria, enuresis, flank pain, frequency, genital sores, hematuria and urgency.   Musculoskeletal: Positive for arthralgias. Negative for back pain, gait problem, joint swelling, myalgias, neck pain and neck stiffness.   Skin: Negative for color change, pallor, rash and wound.   Allergic/Immunologic: Negative for environmental allergies, food allergies and immunocompromised state.   Neurological: Positive for weakness. Negative for dizziness, tremors, seizures, syncope, facial asymmetry, speech difficulty, light-headedness, numbness and headaches.   Hematological: Negative for adenopathy. Does not bruise/bleed easily.   Psychiatric/Behavioral: Negative for agitation, behavioral problems, confusion, decreased concentration, dysphoric mood, hallucinations, self-injury, sleep disturbance and suicidal ideas. The patient is not nervous/anxious and is not hyperactive.           Past Medical History:   Diagnosis Date   • Coronary artery disease        Past Surgical History:   Procedure Laterality Date   • CORONARY ANGIOPLASTY WITH STENT PLACEMENT     • INNER EAR SURGERY  1985       Family History   Problem Relation Age of Onset   • Diabetes Mother    • Diabetes Sister        Social History     Tobacco Use   • Smoking status: Never Smoker   • Smokeless tobacco: Never Used   Substance Use Topics   • Alcohol use: Not on file   • Drug use: Not on file       Home Meds:   Medications Prior to Admission   Medication Sig Dispense Refill Last Dose   • amLODIPine (NORVASC) 2.5 MG tablet Take 1 tablet by mouth Daily. (Patient taking differently: Take 5 mg by mouth Daily.) 90 tablet 3 12/4/2019 at Unknown time   • aspirin (ASPIR-LOW) 81 MG EC tablet Take 1 tablet by mouth  Daily.   12/4/2019 at Unknown time   • atorvastatin (LIPITOR) 20 MG tablet Take 1 tablet by mouth every night at bedtime. (Patient taking differently: Take 20 mg by mouth Daily.) 90 tablet 3 12/4/2019 at Unknown time   • clopidogrel (PLAVIX) 75 MG tablet Take 1 tablet by mouth Daily. 90 tablet 3 12/3/2019 at Unknown time   • lisinopril (PRINIVIL,ZESTRIL) 10 MG tablet Take 1 tablet by mouth Daily. --needs appt for more refills (Patient taking differently: Take 10 mg by mouth Every Night. --needs appt for more refills) 90 tablet 3 12/3/2019 at Unknown time   • metoprolol succinate XL (TOPROL-XL) 25 MG 24 hr tablet Take 25 mg by mouth Every Night.   12/3/2019 at Unknown time   • albuterol sulfate HFA (PROAIR HFA) 108 (90 Base) MCG/ACT inhaler Inhale 1 puff Daily As Needed.   Not Taking       Scheduled Meds:    amLODIPine 5 mg Oral Daily   aspirin 81 mg Oral Daily   atorvastatin 20 mg Oral Daily   metoprolol succinate XL 25 mg Oral Nightly       Continuous Infusions:       PRN Meds:  •  acetaminophen  •  aluminum-magnesium hydroxide-simethicone  •  atropine  •  diphenhydrAMINE  •  ondansetron **OR** ondansetron  •  sodium chloride    Allergies:  Patient has no known allergies.    OBJECTIVE    Vital Signs  Temp:  [97.8 °F (36.6 °C)-98.6 °F (37 °C)] 97.8 °F (36.6 °C)  Heart Rate:  [53-84] 70  Resp:  [15-18] 15  BP: ()/(67-86) 138/84    I/O this shift:  In: 120 [P.O.:120]  Out: -   I/O last 3 completed shifts:  In: -   Out: 600 [Urine:600]    Physical Exam:  General Appearance: alert, appears stated age and cooperative  Head: normocephalic, without obvious abnormality and atraumatic  Eyes: conjunctivae and sclerae normal and no icterus  Neck: supple and no JVD  Lungs: clear to auscultation and respirations regular  Heart: regular rhythm & normal rate and normal S1, S2 +IRIS  Chest Wall: no abnormalities observed  Abdomen: normal bowel sounds and soft non-tender  Extremities: moves extremities well, no edema, no  cyanosis and no redness  Skin: no bleeding, bruising or rash  Neurologic: Alert, and oriented. No focal deficits    Results Review:    I reviewed the patient's new clinical results.    WBC WBC   Date Value Ref Range Status   12/04/2019 6.00 3.40 - 10.80 10*3/mm3 Final      HGB Hemoglobin   Date Value Ref Range Status   12/04/2019 13.9 13.0 - 17.7 g/dL Final      HCT Hematocrit   Date Value Ref Range Status   12/04/2019 39.5 37.5 - 51.0 % Final      Platlets No results found for: LABPLAT   MCV MCV   Date Value Ref Range Status   12/04/2019 93.0 79.0 - 97.0 fL Final          Sodium Sodium   Date Value Ref Range Status   12/05/2019 140 136 - 145 mmol/L Final   12/04/2019 142 136 - 145 mmol/L Final      Potassium Potassium   Date Value Ref Range Status   12/05/2019 4.6 3.5 - 5.2 mmol/L Final   12/04/2019 4.6 3.5 - 5.2 mmol/L Final      Chloride Chloride   Date Value Ref Range Status   12/05/2019 106 98 - 107 mmol/L Final   12/04/2019 105 98 - 107 mmol/L Final      CO2 CO2   Date Value Ref Range Status   12/05/2019 25.0 22.0 - 29.0 mmol/L Final   12/04/2019 29.0 22.0 - 29.0 mmol/L Final      BUN BUN   Date Value Ref Range Status   12/05/2019 22 (H) 6 - 20 mg/dL Final   12/04/2019 25 (H) 6 - 20 mg/dL Final      Creatinine Creatinine   Date Value Ref Range Status   12/05/2019 1.10 0.76 - 1.27 mg/dL Final   12/04/2019 1.43 (H) 0.76 - 1.27 mg/dL Final      Calcium Calcium   Date Value Ref Range Status   12/05/2019 8.9 8.6 - 10.5 mg/dL Final   12/04/2019 9.4 8.6 - 10.5 mg/dL Final      PO4 No results found for: CAPO4   Albumin Albumin   Date Value Ref Range Status   12/05/2019 3.90 3.50 - 5.20 g/dL Final      Magnesium No results found for: MG   Uric Acid No results found for: URICACID       Imaging Results (Last 72 Hours)     ** No results found for the last 72 hours. **                   Results for orders placed during the hospital encounter of 12/04/19   Duplex Carotid Ultrasound CAR    Narrative · Proximal right internal  carotid artery mild stenosis.  · Proximal left internal carotid artery is normal.          ASSESSMENT / PLAN      Abnormal nuclear stress test    Angina pectoris (CMS/HCC)    Coronary artery disease involving native coronary artery of native heart with unstable angina pectoris (CMS/HCC)    Essential hypertension      1. RENAL FAILURE------Nonoliguric. +ARF/ZAIN on top of what appears to be CRF/CKD STG 2, with a baseline serum creatinine of 1.1. Unknown if patient has baseline proteinuria or hematuria. CRF/CKD STG 2 is likely secondary to HTN NS. +ARF/ZAIN is already better and likely was secondary to slight prerenal state with concomitant ACE-I use. Keep off of ACE-I for now. Hydrate in preparation for CABG and given recent IV dye exposure. Check urine and serum studies and renal US. No NSAIDs or further IV dye. Patient has been explained the risks of CABG, with regards to CKD    2. HTN WITH CKD-------Avoid hypotension. Very gradual BP reduction. No ACE/ARB/DRI/diuretic for now    3. CAD S/P CATH------Multivessel disease. CABG eval underway    4. HYPERLIPIDEMIA------On Statin. Check CK, TSH    5. OA/DJD------No NSAIDs. Check uric acid level    I discussed the patients findings and my recommendations with patient, nursing staff and consulting provider    Will follow along closely. Thank you for allowing us to see this patient in renal consultation.    [facility]  [phone]  MD Luis Steve MD  12/05/19  2:46 PM

## 2019-12-05 NOTE — CONSULTS
Patient Care Team:  Sandra Cha PA as PCP - General  James Hernandez MD as Consulting Physician (Cardiology)  Referring Provider:  Dr. Leal  Reason for consultation:  CAD    Chief complaint: chest pain/pressure and SOA    Subjective     History of Present Illness:  57 y/o  gentleman with known hx of CAD s/p prior stenting reports he did not return for his follow up appt with cardiology until his medications ran out because he was feeling well.  After not taking his medications, he noticed some chest pain/pressure and SOA.  Assoc symptoms include lightheadedness and dizziness.  It was mostly exertional and was alleviated with rest.  He underwent stress testing which was positive and reported for an elective cath today.  He has diastolic HTN and dyslipidemia.  He is a  and reports chronic low back pain.  He has a strong family hx of premature CAD.  His right carotid has 16-49% stenosis.  While he was having his carotid duplex, he was noted to have an approx 5 sec pause.  Additionally while I was listening for a carotid bruit he dropped his heart rate to 40s.  Cardiac cath revealed LAD/diagonal disease.  Dr. Boyle was asked to see for surgical revascularization.    Review of Systems   Constitutional: Positive for fatigue.   Respiratory: Positive for shortness of breath.    Cardiovascular: Positive for chest pain and leg swelling. Negative for palpitations.   Gastrointestinal: Negative for abdominal pain, constipation and vomiting.   Musculoskeletal: Positive for back pain.   Neurological: Positive for dizziness and light-headedness.   Psychiatric/Behavioral:        +anxiety   All other systems reviewed and are negative.       Past Medical History:   Diagnosis Date   • Coronary artery disease    · HTN  · Dyslipidemia  Past Surgical History:   Procedure Laterality Date   • CORONARY ANGIOPLASTY WITH STENT PLACEMENT     • INNER EAR SURGERY  1985     Family History   Problem Relation  "Age of Onset   • Diabetes Mother    • Diabetes Sister    · CAD both brothers in their 50s, dad with CAD  Social History   · Pt has an 12 y/o daughter.  Plans for her to stay with her mother while he is in the hospital.  Tobacco Use   • Smoking status: Never Smoker   • Smokeless tobacco: Never Used   Substance Use Topics   • Alcohol use: Not on file   • Drug use: Not on file     Medications Prior to Admission   Medication Sig Dispense Refill Last Dose   • amLODIPine (NORVASC) 2.5 MG tablet Take 1 tablet by mouth Daily. (Patient taking differently: Take 5 mg by mouth Daily.) 90 tablet 3 12/4/2019 at Unknown time   • aspirin (ASPIR-LOW) 81 MG EC tablet Take 1 tablet by mouth Daily.   12/4/2019 at Unknown time   • atorvastatin (LIPITOR) 20 MG tablet Take 1 tablet by mouth every night at bedtime. (Patient taking differently: Take 20 mg by mouth Daily.) 90 tablet 3 12/4/2019 at Unknown time   • clopidogrel (PLAVIX) 75 MG tablet Take 1 tablet by mouth Daily. 90 tablet 3 12/3/2019 at Unknown time   • lisinopril (PRINIVIL,ZESTRIL) 10 MG tablet Take 1 tablet by mouth Daily. --needs appt for more refills (Patient taking differently: Take 10 mg by mouth Every Night. --needs appt for more refills) 90 tablet 3 12/3/2019 at Unknown time   • metoprolol succinate XL (TOPROL-XL) 25 MG 24 hr tablet Take 25 mg by mouth Every Night.   12/3/2019 at Unknown time   • albuterol sulfate HFA (PROAIR HFA) 108 (90 Base) MCG/ACT inhaler Inhale 1 puff Daily As Needed.   Not Taking       amLODIPine 5 mg Oral Daily   aspirin 81 mg Oral Daily   atorvastatin 20 mg Oral Daily   metoprolol succinate XL 25 mg Oral Nightly     Allergies:  Patient has no known allergies.    Objective      Vital Signs  Temp:  [97.8 °F (36.6 °C)-98.6 °F (37 °C)] 97.8 °F (36.6 °C)  Heart Rate:  [53-84] 70  Resp:  [15-18] 15  BP: ()/(67-86) 138/84    Flowsheet Rows      First Filed Value   Admission Height  177.8 cm (70\") Documented at 12/04/2019 1632   Admission Weight " " 87.9 kg (193 lb 12.6 oz) Documented at 12/04/2019 1632        177.8 cm (70\")    Physical Exam   Constitutional: He is oriented to person, place, and time. Vital signs are normal. He appears well-developed and well-nourished. He is active and cooperative.   HENT:   Head: Normocephalic and atraumatic.   Nose: Nose normal.   Mouth/Throat: Uvula is midline, oropharynx is clear and moist and mucous membranes are normal.   Eyes: Conjunctivae, EOM and lids are normal. Pupils are equal, round, and reactive to light. No scleral icterus.   Neck: Trachea normal. Neck supple. Normal carotid pulses, no hepatojugular reflux and no JVD present. Carotid bruit is not present. No thyroid mass and no thyromegaly present.   Cardiovascular: Normal rate, regular rhythm, normal heart sounds and intact distal pulses.   No murmur heard.  Pulses:       Carotid pulses are 2+ on the right side, and 2+ on the left side.       Radial pulses are 2+ on the right side, and 2+ on the left side.        Femoral pulses are 2+ on the right side, and 2+ on the left side.       Popliteal pulses are 2+ on the right side, and 2+ on the left side.        Dorsalis pedis pulses are 2+ on the right side, and 2+ on the left side.        Posterior tibial pulses are 2+ on the right side, and 2+ on the left side.   Pulmonary/Chest: Effort normal and breath sounds normal.   Abdominal: Soft. Normal appearance, normal aorta and bowel sounds are normal. He exhibits no distension and no abdominal bruit. There is no hepatosplenomegaly. There is no tenderness.   Lymphadenopathy:     He has no cervical adenopathy.        Right: No supraclavicular adenopathy present.        Left: No supraclavicular adenopathy present.   Neurological: He is alert and oriented to person, place, and time. GCS eye subscore is 4. GCS verbal subscore is 5. GCS motor subscore is 6.   Skin: Skin is warm, dry and intact. Capillary refill takes less than 2 seconds. No rash noted. No cyanosis or " erythema. Nails show no clubbing.   Psychiatric: He has a normal mood and affect. His speech is normal and behavior is normal. Judgment and thought content normal. Cognition and memory are normal.   Nursing note and vitals reviewed.      Results Review:   Lab Results (last 24 hours)     Procedure Component Value Units Date/Time    POC Glucose Once [[id number]]  (Normal) Collected:  12/05/19 1200    Specimen:  Blood Updated:  12/05/19 1226     Glucose 86 mg/dL      Comment: Serial Number: [id number]:  [id number]       Comprehensive Metabolic Panel [[id number]]  (Abnormal) Collected:  12/05/19 0557    Specimen:  Blood Updated:  12/05/19 0630     Glucose 94 mg/dL      BUN 22 mg/dL      Creatinine 1.10 mg/dL      Sodium 140 mmol/L      Potassium 4.6 mmol/L      Chloride 106 mmol/L      CO2 25.0 mmol/L      Calcium 8.9 mg/dL      Total Protein 6.5 g/dL      Albumin 3.90 g/dL      ALT (SGPT) 21 U/L      AST (SGOT) 17 U/L      Alkaline Phosphatase 91 U/L      Total Bilirubin 0.5 mg/dL      eGFR Non African Amer 69 mL/min/1.73      Globulin 2.6 gm/dL      A/G Ratio 1.5 g/dL      BUN/Creatinine Ratio 20.0     Anion Gap 9.0 mmol/L     Narrative:       GFR Normal >60  Chronic Kidney Disease <60  Kidney Failure <15    Platelet Function ADP [[id number]]  (Abnormal) Collected:  12/05/19 0557    Specimen:  Blood Updated:  12/05/19 0608     ADP Aggregation, % Platelet 53 %               Assessment/Plan       Abnormal nuclear stress test    Angina pectoris (CMS/HCC)    Coronary artery disease involving native coronary artery of native heart with unstable angina pectoris (CMS/HCC)    Essential hypertension      Assessment & Plan     MV CAD, s/p stenting, EF 58% (stress test)--surgical workup ongoing  Plavix use--plt agg 53%  HTN--stable  Dyslipidemia--statin    Carotid duplex, vein mapping, preop orders initiated  Plans for CABG tomorrow per Dr. Boyle      Thank you for allowing us to participate in the care of this patient.       Lynn Ruelas, APRN  12/05/19  2:34 PM    Recurrent coronary disease now involving stent to diagonal and as well as LAD.  Tentative plan for coronary bypass tomorrow, specifically LIMA to LAD and saphenous vein to diagonal.

## 2019-12-05 NOTE — NURSING NOTE
Patient had vagal response during carotid duplex/vein mapping. Nurse reported to bedside. Pt sweating, nauseous, A&O. BP WDL. HR returned to baseline without medical intervention. Pt returned to room and test will be completed at a later time. Glucose WDL.  Dr. Leal notified.

## 2019-12-05 NOTE — PROGRESS NOTES
"    Reason for follow-up: Severe LAD disease  Unstable angina     Patient Care Team:  Sandra Cha PA as PCP - General  James Hernandez MD as Consulting Physician (Cardiology)    Subjective .   Feels okay     Review of Systems   Constitution: Positive for malaise/fatigue. Negative for fever.   HENT: Negative for congestion and hearing loss.    Eyes: Negative for double vision and visual disturbance.   Cardiovascular: Positive for chest pain. Negative for claudication, dyspnea on exertion, leg swelling and syncope.   Respiratory: Negative for cough and shortness of breath.    Endocrine: Negative for cold intolerance.   Skin: Negative for color change and rash.   Musculoskeletal: Negative for arthritis and joint pain.   Gastrointestinal: Negative for abdominal pain and heartburn.   Genitourinary: Negative for hematuria.   Neurological: Negative for excessive daytime sleepiness and dizziness.   Psychiatric/Behavioral: Negative for depression. The patient is not nervous/anxious.    All other systems reviewed and are negative.      Patient has no known allergies.    Scheduled Meds:  amLODIPine 5 mg Oral Daily   aspirin 81 mg Oral Daily   atorvastatin 20 mg Oral Daily   metoprolol succinate XL 25 mg Oral Nightly     Continuous Infusions:   PRN Meds:.•  acetaminophen  •  aluminum-magnesium hydroxide-simethicone  •  atropine  •  diphenhydrAMINE  •  ondansetron **OR** ondansetron  •  sodium chloride    Objective   Looks comfortable lying in the bed    VITAL SIGNS  Vitals:    12/05/19 0200 12/05/19 0220 12/05/19 0240 12/05/19 0700   BP: 97/67  132/71 109/73   BP Location:   Right arm    Patient Position:   Sitting    Pulse: 62 71 63 67   Resp:   18 15   Temp:   98.2 °F (36.8 °C) 97.8 °F (36.6 °C)   TempSrc:    Oral   SpO2:  95% 95% 94%   Weight:       Height:           Flowsheet Rows      First Filed Value   Admission Height  177.8 cm (70\") Documented at 12/04/2019 1632   Admission Weight  87.9 kg (193 lb 12.6 " oz) Documented at 12/04/2019 1632           TELEMETRY: Sinus rhythm    Physical Exam:  Physical Exam   Constitutional: He is oriented to person, place, and time. He appears well-developed and well-nourished. He is cooperative.   HENT:   Head: Normocephalic and atraumatic.   Mouth/Throat: Uvula is midline and oropharynx is clear and moist. No oral lesions.   Eyes: Conjunctivae are normal. No scleral icterus.   Neck: Trachea normal. Neck supple. No JVD present. Carotid bruit is not present. No thyromegaly present.   Cardiovascular: Normal rate, regular rhythm, S1 normal, S2 normal, normal heart sounds, intact distal pulses and normal pulses. PMI is not displaced. Exam reveals no gallop and no friction rub.   No murmur heard.  Pulmonary/Chest: Effort normal and breath sounds normal.   Abdominal: Soft. Bowel sounds are normal.   Musculoskeletal: Normal range of motion.   Neurological: He is alert and oriented to person, place, and time. He has normal strength.   No focal deficits   Skin: Skin is warm. No cyanosis.   Right groin no hematoma   Psychiatric: He has a normal mood and affect.                LAB RESULTS (LAST 7 DAYS)    CBC  Results from last 7 days   Lab Units 12/04/19  1533   WBC 10*3/mm3 6.00   RBC 10*6/mm3 4.24   HEMOGLOBIN g/dL 13.9   HEMATOCRIT % 39.5   MCV fL 93.0   PLATELETS 10*3/mm3 216       BMP  Results from last 7 days   Lab Units 12/05/19  0557 12/04/19  1533   SODIUM mmol/L 140 142   POTASSIUM mmol/L 4.6 4.6   CHLORIDE mmol/L 106 105   CO2 mmol/L 25.0 29.0   BUN mg/dL 22* 25*   CREATININE mg/dL 1.10 1.43*   GLUCOSE mg/dL 94 96       CMP Results from last 7 days   Lab Units 12/05/19  0557 12/04/19  1533   SODIUM mmol/L 140 142   POTASSIUM mmol/L 4.6 4.6   CHLORIDE mmol/L 106 105   CO2 mmol/L 25.0 29.0   BUN mg/dL 22* 25*   CREATININE mg/dL 1.10 1.43*   GLUCOSE mg/dL 94 96   ALBUMIN g/dL 3.90  --    BILIRUBIN mg/dL 0.5  --    ALK PHOS U/L 91  --    AST (SGOT) U/L 17  --    ALT (SGPT) U/L 21  --           BNP        TROPONIN        CoAg  Results from last 7 days   Lab Units 12/04/19  1533   INR  1.10       Creatinine Clearance  Estimated Creatinine Clearance: 82.8 mL/min (by C-G formula based on SCr of 1.1 mg/dL).    ABG        Radiology  No radiology results for the last day          EKG    I personally viewed and interpreted the patient's EKG/Telemetry data:    ECHOCARDIOGRAM:       STRESS MYOVIEW:    CARDIAC CATHETERIZATION:    OTHER:         Assessment/Plan       Abnormal nuclear stress test    Angina pectoris (CMS/HCC)    Coronary artery disease involving native coronary artery of native heart with unstable angina pectoris (CMS/HCC)    Essential hypertension      Patient underwent cardiac cath which showed severe LAD and diagonal artery disease  Films reviewed with interventional cardiology Dr. Bridges and Dr. Hooper    It was decided patient probably would get benefit from two-vessel CABG so patient is now admitted to the CV surgery first two-vessel CABG  We will hold the Plavix    I discussed the patients findings and my recommendations with patient and Attending nurse    James Hernandez MD  12/05/19  8:55 AM

## 2019-12-05 NOTE — PROGRESS NOTES
Discharge Planning Assessment   Paresh     Patient Name: Duke Freire  MRN: [id number]  Today's Date: 12/5/2019    Admit Date: 12/4/2019    Discharge Needs Assessment     Row Name 12/05/19 0742       Living Environment    Lives With  child(janet), dependent    Current Living Arrangements  home/apartment/condo    Primary Care Provided by  self    Provides Primary Care For  child(janet)    Family Caregiver if Needed  none    Able to Return to Prior Arrangements  yes       Resource/Environmental Concerns    Resource/Environmental Concerns  none    Transportation Concerns  car, none       Transition Planning    Patient/Family Anticipates Transition to  home with family    Patient/Family Anticipated Services at Transition  none    Transportation Anticipated  car, drives self;family or friend will provide       Discharge Needs Assessment    Readmission Within the Last 30 Days  no previous admission in last 30 days    Concerns to be Addressed  no discharge needs identified;denies needs/concerns at this time    Equipment Currently Used at Home  none    Anticipated Changes Related to Illness  none    Equipment Needed After Discharge  none        Discharge Plan     Row Name 12/05/19 0743       Plan    Plan  DC Plan: Pending clinical course, may have CABG this admission.  If not, will discharge home with family     Patient/Family in Agreement with Plan  yes    Plan Comments  Patient states he lives with dependent daughter.  Reports he still drives.  Denies any issues obtaining medications.  Denies any needs at this time.  PCP: Semaj           Demographic Summary     Row Name 12/05/19 0741       General Information    Arrived From  home    Referral Source  admission list    Reason for Consult  discharge planning    Preferred Language  English     Used During This Interaction  no        Functional Status     Row Name 12/05/19 0741       Functional Status    Usual Activity Tolerance  good    Current Activity  Tolerance  good       Functional Status, IADL    Medications  independent    Meal Preparation  independent    Housekeeping  independent    Laundry  independent    Shopping  independent       Mental Status    General Appearance WDL  WDL       Mental Status Summary    Recent Changes in Mental Status/Cognitive Functioning  no changes        Judi Maharaj RN    /Utilization Review  [address] [address]    [phone] office  [phone] fax  [email or web address]  [facility]    Hospital NPI:   Hospital Tax ID: [id number]

## 2019-12-06 ENCOUNTER — APPOINTMENT (OUTPATIENT)
Dept: GENERAL RADIOLOGY | Facility: HOSPITAL | Age: 59
End: 2019-12-06

## 2019-12-06 ENCOUNTER — ANESTHESIA (OUTPATIENT)
Dept: PERIOP | Facility: HOSPITAL | Age: 59
End: 2019-12-06

## 2019-12-06 ENCOUNTER — ANESTHESIA EVENT (OUTPATIENT)
Dept: PERIOP | Facility: HOSPITAL | Age: 59
End: 2019-12-06

## 2019-12-06 ENCOUNTER — APPOINTMENT (OUTPATIENT)
Dept: ULTRASOUND IMAGING | Facility: HOSPITAL | Age: 59
End: 2019-12-06

## 2019-12-06 PROBLEM — I25.10 CORONARY ARTERY DISEASE: Status: ACTIVE | Noted: 2019-12-06

## 2019-12-06 LAB
ABO + RH BLD: NORMAL
ABO + RH BLD: NORMAL
ACT BLD: 125 SECONDS (ref 89–137)
ACT BLD: 131 SECONDS (ref 89–137)
ACT BLD: 395 SECONDS (ref 89–137)
ACT BLD: 472 SECONDS (ref 89–137)
ACT BLD: 516 SECONDS (ref 89–137)
ACT BLD: 709 SECONDS (ref 89–137)
ALBUMIN SERPL-MCNC: 4 G/DL (ref 3.5–5.2)
ALBUMIN SERPL-MCNC: 4.1 G/DL (ref 3.5–5.2)
ALBUMIN/GLOB SERPL: 1.6 G/DL
ALBUMIN/GLOB SERPL: 2.9 G/DL
ALP SERPL-CCNC: 48 U/L (ref 39–117)
ALP SERPL-CCNC: 92 U/L (ref 39–117)
ALT SERPL W P-5'-P-CCNC: 15 U/L (ref 1–41)
ALT SERPL W P-5'-P-CCNC: 25 U/L (ref 1–41)
ANION GAP SERPL CALCULATED.3IONS-SCNC: 10 MMOL/L (ref 5–15)
ANION GAP SERPL CALCULATED.3IONS-SCNC: 9 MMOL/L (ref 5–15)
APTT PPP: 27.3 SECONDS (ref 24–31)
APTT PPP: 27.8 SECONDS (ref 24–31)
APTT PPP: 29 SECONDS (ref 24–31)
ARTERIAL PATENCY WRIST A: ABNORMAL
ARTERIAL PATENCY WRIST A: POSITIVE
AST SERPL-CCNC: 20 U/L (ref 1–40)
AST SERPL-CCNC: 24 U/L (ref 1–40)
ATMOSPHERIC PRESS: ABNORMAL MM[HG]
BASE DEFICIT: ABNORMAL
BASE EXCESS BLDA CALC-SCNC: -1 MMOL/L (ref 0–3)
BASE EXCESS BLDA CALC-SCNC: -1.9 MMOL/L (ref 0–3)
BASE EXCESS BLDA CALC-SCNC: 0 MMOL/L (ref 0–3)
BASE EXCESS BLDA CALC-SCNC: 0.6 MMOL/L (ref 0–3)
BASE EXCESS BLDA CALC-SCNC: 1 MMOL/L (ref 0–3)
BASE EXCESS BLDA CALC-SCNC: 2.8 MMOL/L (ref 0–3)
BASE EXCESS BLDA CALC-SCNC: 3 MMOL/L (ref 0–3)
BASE EXCESS BLDA CALC-SCNC: 5 MMOL/L (ref 0–3)
BASE EXCESS BLDA CALC-SCNC: 6 MMOL/L (ref 0–3)
BASE EXCESS BLDV CALC-SCNC: ABNORMAL MMOL/L
BASOPHILS # BLD AUTO: 0 10*3/MM3 (ref 0–0.2)
BASOPHILS NFR BLD AUTO: 0.6 % (ref 0–1.5)
BDY SITE: ABNORMAL
BH BB BLOOD EXPIRATION DATE: NORMAL
BH BB BLOOD EXPIRATION DATE: NORMAL
BH BB BLOOD TYPE BARCODE: 5100
BH BB BLOOD TYPE BARCODE: 5100
BH BB DISPENSE STATUS: NORMAL
BH BB DISPENSE STATUS: NORMAL
BH BB PRODUCT CODE: NORMAL
BH BB PRODUCT CODE: NORMAL
BH BB UNIT NUMBER: NORMAL
BH BB UNIT NUMBER: NORMAL
BILIRUB SERPL-MCNC: 0.4 MG/DL (ref 0.2–1.2)
BILIRUB SERPL-MCNC: 0.7 MG/DL (ref 0.2–1.2)
BODY TEMPERATURE: 93.2 C
BODY TEMPERATURE: 93.8 C
BODY TEMPERATURE: 95.3 C
BUN BLD-MCNC: 15 MG/DL (ref 6–20)
BUN BLD-MCNC: 15 MG/DL (ref 6–20)
BUN/CREAT SERPL: 13.2 (ref 7–25)
BUN/CREAT SERPL: 13.3 (ref 7–25)
CA-I BLDA-SCNC: 0.94 MMOL/L (ref 1.12–1.32)
CA-I BLDA-SCNC: 0.95 MMOL/L (ref 1.12–1.32)
CA-I BLDA-SCNC: 0.97 MMOL/L (ref 1.12–1.32)
CA-I BLDA-SCNC: 1.06 MMOL/L (ref 1.15–1.33)
CA-I BLDA-SCNC: 1.13 MMOL/L (ref 1.15–1.33)
CA-I BLDA-SCNC: 1.13 MMOL/L (ref 1.15–1.33)
CA-I BLDA-SCNC: 1.2 MMOL/L (ref 1.12–1.32)
CA-I BLDA-SCNC: 1.23 MMOL/L (ref 1.15–1.33)
CA-I BLDA-SCNC: 1.26 MMOL/L (ref 1.12–1.32)
CA-I BLDA-SCNC: 1.36 MMOL/L (ref 1.12–1.32)
CA-I BLDA-SCNC: 1.42 MMOL/L (ref 1.12–1.32)
CA-I SERPL ISE-MCNC: 1.21 MMOL/L (ref 1.2–1.3)
CA-I SERPL ISE-MCNC: 1.22 MMOL/L (ref 1.2–1.3)
CALCIUM SPEC-SCNC: 8.9 MG/DL (ref 8.6–10.5)
CALCIUM SPEC-SCNC: 9.1 MG/DL (ref 8.6–10.5)
CHLORIDE SERPL-SCNC: 101 MMOL/L (ref 98–107)
CHLORIDE SERPL-SCNC: 107 MMOL/L (ref 98–107)
CLOSE TME COLL+ADP + EPINEP PNL BLD: 69 % (ref 86–100)
CLOSE TME COLL+ADP + EPINEP PNL BLD: 84 % (ref 86–100)
CLOSE TME COLL+ADP + EPINEP PNL BLD: 85 % (ref 86–100)
CO2 BLDA-SCNC: 26 MMOL/L (ref 23–27)
CO2 BLDA-SCNC: 26.9 MMOL/L (ref 22–29)
CO2 BLDA-SCNC: 27 MMOL/L (ref 23–27)
CO2 BLDA-SCNC: 27 MMOL/L (ref 23–27)
CO2 BLDA-SCNC: 27.3 MMOL/L (ref 22–29)
CO2 BLDA-SCNC: 27.8 MMOL/L (ref 22–29)
CO2 BLDA-SCNC: 28 MMOL/L (ref 23–27)
CO2 BLDA-SCNC: 28.2 MMOL/L (ref 22–29)
CO2 BLDA-SCNC: 29.5 MMOL/L (ref 22–29)
CO2 BLDA-SCNC: 31 MMOL/L (ref 23–27)
CO2 BLDA-SCNC: 32 MMOL/L (ref 23–27)
CO2 CONTENT VENOUS: 25 MMOL/L (ref 24–29)
CO2 SERPL-SCNC: 23 MMOL/L (ref 22–29)
CO2 SERPL-SCNC: 28 MMOL/L (ref 22–29)
CREAT BLD-MCNC: 1.13 MG/DL (ref 0.76–1.27)
CREAT BLD-MCNC: 1.14 MG/DL (ref 0.76–1.27)
DEPRECATED RDW RBC AUTO: 42 FL (ref 37–54)
DEPRECATED RDW RBC AUTO: 43.8 FL (ref 37–54)
DEPRECATED RDW RBC AUTO: 43.8 FL (ref 37–54)
DEPRECATED RDW RBC AUTO: 44.6 FL (ref 37–54)
EOSINOPHIL # BLD AUTO: 0.1 10*3/MM3 (ref 0–0.4)
EOSINOPHIL NFR BLD AUTO: 1.4 % (ref 0.3–6.2)
ERYTHROCYTE [DISTWIDTH] IN BLOOD BY AUTOMATED COUNT: 13 % (ref 12.3–15.4)
ERYTHROCYTE [DISTWIDTH] IN BLOOD BY AUTOMATED COUNT: 13.2 % (ref 12.3–15.4)
ERYTHROCYTE [DISTWIDTH] IN BLOOD BY AUTOMATED COUNT: 13.4 % (ref 12.3–15.4)
ERYTHROCYTE [DISTWIDTH] IN BLOOD BY AUTOMATED COUNT: 13.4 % (ref 12.3–15.4)
FIBRINOGEN PPP-MCNC: 144 MG/DL (ref 210–450)
FIBRINOGEN PPP-MCNC: 205 MG/DL (ref 210–450)
GFR SERPL CREATININE-BSD FRML MDRD: 66 ML/MIN/1.73
GFR SERPL CREATININE-BSD FRML MDRD: 67 ML/MIN/1.73
GLOBULIN UR ELPH-MCNC: 1.4 GM/DL
GLOBULIN UR ELPH-MCNC: 2.5 GM/DL
GLUCOSE BLD-MCNC: 134 MG/DL (ref 65–99)
GLUCOSE BLD-MCNC: 93 MG/DL (ref 65–99)
GLUCOSE BLDC GLUCOMTR-MCNC: 101 MG/DL (ref 70–105)
GLUCOSE BLDC GLUCOMTR-MCNC: 105 MG/DL (ref 70–105)
GLUCOSE BLDC GLUCOMTR-MCNC: 105 MG/DL (ref 70–105)
GLUCOSE BLDC GLUCOMTR-MCNC: 106 MG/DL (ref 70–105)
GLUCOSE BLDC GLUCOMTR-MCNC: 106 MG/DL (ref 70–105)
GLUCOSE BLDC GLUCOMTR-MCNC: 110 MG/DL (ref 70–105)
GLUCOSE BLDC GLUCOMTR-MCNC: 112 MG/DL (ref 70–105)
GLUCOSE BLDC GLUCOMTR-MCNC: 126 MG/DL (ref 70–105)
GLUCOSE BLDC GLUCOMTR-MCNC: 132 MG/DL (ref 74–100)
GLUCOSE BLDC GLUCOMTR-MCNC: 133 MG/DL (ref 74–100)
GLUCOSE BLDC GLUCOMTR-MCNC: 158 MG/DL (ref 74–100)
GLUCOSE BLDC GLUCOMTR-MCNC: 166 MG/DL (ref 74–100)
GLUCOSE BLDC GLUCOMTR-MCNC: 97 MG/DL (ref 70–105)
GLUCOSE BLDC GLUCOMTR-MCNC: 97 MG/DL (ref 70–105)
HCO3 BLDA-SCNC: 24.8 MMOL/L (ref 22–26)
HCO3 BLDA-SCNC: 25.5 MMOL/L (ref 22–26)
HCO3 BLDA-SCNC: 25.6 MMOL/L (ref 21–28)
HCO3 BLDA-SCNC: 25.6 MMOL/L (ref 21–28)
HCO3 BLDA-SCNC: 25.6 MMOL/L (ref 22–26)
HCO3 BLDA-SCNC: 26.1 MMOL/L (ref 21–28)
HCO3 BLDA-SCNC: 26.8 MMOL/L (ref 21–28)
HCO3 BLDA-SCNC: 27 MMOL/L (ref 22–26)
HCO3 BLDA-SCNC: 28.1 MMOL/L (ref 21–28)
HCO3 BLDA-SCNC: 29.2 MMOL/L (ref 22–26)
HCO3 BLDA-SCNC: 30.6 MMOL/L (ref 22–26)
HCO3 BLDV-SCNC: 23.4 MMOL/L (ref 23–28)
HCT VFR BLD AUTO: 23.2 % (ref 37.5–51)
HCT VFR BLD AUTO: 23.8 % (ref 37.5–51)
HCT VFR BLD AUTO: 26.4 % (ref 37.5–51)
HCT VFR BLD AUTO: 38.1 % (ref 37.5–51)
HCT VFR BLDA CALC: 22 % (ref 38–51)
HCT VFR BLDA CALC: 22 % (ref 38–51)
HCT VFR BLDA CALC: 23 % (ref 38–51)
HCT VFR BLDA CALC: 23 % (ref 38–51)
HCT VFR BLDA CALC: 24 % (ref 38–51)
HCT VFR BLDA CALC: 25 % (ref 38–51)
HCT VFR BLDA CALC: 26 % (ref 38–51)
HCT VFR BLDA CALC: 26 % (ref 38–51)
HCT VFR BLDA CALC: 30 % (ref 38–51)
HEMODILUTION: NO
HEMODILUTION: YES
HGB BLD-MCNC: 13.7 G/DL (ref 13–17.7)
HGB BLD-MCNC: 8.4 G/DL (ref 13–17.7)
HGB BLD-MCNC: 8.8 G/DL (ref 13–17.7)
HGB BLD-MCNC: 9.8 G/DL (ref 13–17.7)
HGB BLDA-MCNC: 10.2 G/DL (ref 12–17)
HGB BLDA-MCNC: 7.5 G/DL (ref 12–17)
HGB BLDA-MCNC: 7.6 G/DL (ref 12–17)
HGB BLDA-MCNC: 7.8 G/DL (ref 12–17)
HGB BLDA-MCNC: 7.9 G/DL (ref 12–17)
HGB BLDA-MCNC: 8.1 G/DL (ref 12–17)
HGB BLDA-MCNC: 8.2 G/DL (ref 12–17)
HGB BLDA-MCNC: 8.2 G/DL (ref 12–17)
HGB BLDA-MCNC: 8.5 G/DL (ref 12–17)
HGB BLDA-MCNC: 8.8 G/DL (ref 12–17)
HGB BLDA-MCNC: 8.8 G/DL (ref 12–17)
HOROWITZ INDEX BLD+IHG-RTO: 40 %
HOROWITZ INDEX BLD+IHG-RTO: 70 %
INR PPP: 1.22 (ref 0.9–1.1)
INR PPP: 1.3 (ref 0.9–1.1)
INR PPP: 1.33 (ref 0.9–1.1)
LYMPHOCYTES # BLD AUTO: 0.7 10*3/MM3 (ref 0.7–3.1)
LYMPHOCYTES NFR BLD AUTO: 10.5 % (ref 19.6–45.3)
MAGNESIUM SERPL-MCNC: 2.1 MG/DL (ref 1.6–2.6)
MCH RBC QN AUTO: 33.6 PG (ref 26.6–33)
MCH RBC QN AUTO: 33.6 PG (ref 26.6–33)
MCH RBC QN AUTO: 33.8 PG (ref 26.6–33)
MCH RBC QN AUTO: 34 PG (ref 26.6–33)
MCHC RBC AUTO-ENTMCNC: 35.9 G/DL (ref 31.5–35.7)
MCHC RBC AUTO-ENTMCNC: 36.4 G/DL (ref 31.5–35.7)
MCHC RBC AUTO-ENTMCNC: 36.7 G/DL (ref 31.5–35.7)
MCHC RBC AUTO-ENTMCNC: 37.1 G/DL (ref 31.5–35.7)
MCV RBC AUTO: 91.1 FL (ref 79–97)
MCV RBC AUTO: 91.6 FL (ref 79–97)
MCV RBC AUTO: 93.4 FL (ref 79–97)
MCV RBC AUTO: 93.5 FL (ref 79–97)
MODALITY: ABNORMAL
MONOCYTES # BLD AUTO: 0.3 10*3/MM3 (ref 0.1–0.9)
MONOCYTES NFR BLD AUTO: 4.8 % (ref 5–12)
MRSA SPEC QL CULT: NORMAL
NEUTROPHILS # BLD AUTO: 5.2 10*3/MM3 (ref 1.7–7)
NEUTROPHILS NFR BLD AUTO: 82.7 % (ref 42.7–76)
NRBC BLD AUTO-RTO: 0 /100 WBC (ref 0–0.2)
PCO2 BLDA: 40 MM HG (ref 35–45)
PCO2 BLDA: 41 MM HG (ref 35–45)
PCO2 BLDA: 42.1 MM HG (ref 35–48)
PCO2 BLDA: 43 MM HG (ref 35–45)
PCO2 BLDA: 43 MM HG (ref 35–45)
PCO2 BLDA: 45 MM HG (ref 35–45)
PCO2 BLDA: 46 MM HG (ref 35–45)
PCO2 BLDA: 46.2 MM HG (ref 35–48)
PCO2 BLDA: 46.5 MM HG (ref 35–48)
PCO2 BLDA: 56.1 MM HG (ref 35–48)
PCO2 BLDA: 57.7 MM HG (ref 35–48)
PCO2 BLDV: 41 MM HG (ref 41–51)
PCO2 TEMP ADJ BLD: 36.9 MM HG (ref 35–48)
PCO2 TEMP ADJ BLD: 49.9 MM HG (ref 35–48)
PCO2 TEMP ADJ BLD: 53.3 MM HG (ref 35–48)
PEEP RESPIRATORY: 5 CM[H2O]
PH BLDA: 7.25 PH UNITS (ref 7.35–7.45)
PH BLDA: 7.28 PH UNITS (ref 7.35–7.45)
PH BLDA: 7.37 PH UNITS (ref 7.35–7.45)
PH BLDA: 7.37 PH UNITS (ref 7.35–7.45)
PH BLDA: 7.38 PH UNITS (ref 7.35–7.45)
PH BLDA: 7.39 PH UNITS (ref 7.35–7.45)
PH BLDA: 7.39 PH UNITS (ref 7.35–7.45)
PH BLDA: 7.41 PH UNITS (ref 7.35–7.45)
PH BLDA: 7.41 PH UNITS (ref 7.35–7.45)
PH BLDA: 7.43 PH UNITS (ref 7.35–7.45)
PH BLDA: 7.44 PH UNITS (ref 7.35–7.45)
PH BLDV: 7.37 PH UNITS (ref 7.31–7.41)
PH, TEMP CORRECTED: 7.28 PH UNITS (ref 7.35–7.45)
PH, TEMP CORRECTED: 7.31 PH UNITS (ref 7.35–7.45)
PH, TEMP CORRECTED: 7.44 PH UNITS (ref 7.35–7.45)
PHOSPHATE SERPL-MCNC: 3.2 MG/DL (ref 2.5–4.5)
PHOSPHATE SERPL-MCNC: 3.3 MG/DL (ref 2.5–4.5)
PLATELET # BLD AUTO: 117 10*3/MM3 (ref 140–450)
PLATELET # BLD AUTO: 117 10*3/MM3 (ref 140–450)
PLATELET # BLD AUTO: 123 10*3/MM3 (ref 140–450)
PLATELET # BLD AUTO: 172 10*3/MM3 (ref 140–450)
PLATELET # BLD AUTO: 188 10*3/MM3 (ref 140–450)
PMV BLD AUTO: 7.4 FL (ref 6–12)
PMV BLD AUTO: 7.6 FL (ref 6–12)
PMV BLD AUTO: 7.7 FL (ref 6–12)
PMV BLD AUTO: 7.7 FL (ref 6–12)
PO2 BLDA: 112.6 MM HG (ref 83–108)
PO2 BLDA: 133.9 MM HG (ref 83–108)
PO2 BLDA: 263 MM HG (ref 80–105)
PO2 BLDA: 322.6 MM HG (ref 83–108)
PO2 BLDA: 336 MM HG (ref 80–105)
PO2 BLDA: 379 MM HG (ref 80–105)
PO2 BLDA: 384 MM HG (ref 80–105)
PO2 BLDA: 412 MM HG (ref 80–105)
PO2 BLDA: 422 MM HG (ref 80–105)
PO2 BLDA: 78.5 MM HG (ref 83–108)
PO2 BLDA: 93.6 MM HG (ref 83–108)
PO2 BLDV: 51 MM HG
PO2 TEMP ADJ BLD: 308 MM HG (ref 83–108)
PO2 TEMP ADJ BLD: 83.5 MM HG (ref 83–108)
PO2 TEMP ADJ BLD: 97.3 MM HG (ref 83–108)
POTASSIUM BLD-SCNC: 3.9 MMOL/L (ref 3.5–5.2)
POTASSIUM BLD-SCNC: 4.2 MMOL/L (ref 3.5–5.2)
POTASSIUM BLDA-SCNC: 3.8 MMOL/L (ref 3.5–4.9)
POTASSIUM BLDA-SCNC: 3.8 MMOL/L (ref 3.5–4.9)
POTASSIUM BLDA-SCNC: 4 MMOL/L (ref 3.5–4.5)
POTASSIUM BLDA-SCNC: 4.1 MMOL/L (ref 3.5–4.5)
POTASSIUM BLDA-SCNC: 4.3 MMOL/L (ref 3.5–4.9)
POTASSIUM BLDA-SCNC: 4.3 MMOL/L (ref 3.5–4.9)
POTASSIUM BLDA-SCNC: 4.6 MMOL/L (ref 3.5–4.5)
POTASSIUM BLDA-SCNC: 4.6 MMOL/L (ref 3.5–4.9)
POTASSIUM BLDA-SCNC: 4.7 MMOL/L (ref 3.5–4.5)
POTASSIUM BLDA-SCNC: 4.9 MMOL/L (ref 3.5–4.9)
POTASSIUM BLDA-SCNC: 5 MMOL/L (ref 3.5–4.9)
PROT SERPL-MCNC: 5.5 G/DL (ref 6–8.5)
PROT SERPL-MCNC: 6.5 G/DL (ref 6–8.5)
PROTHROMBIN TIME: 12.4 SECONDS (ref 9.6–11.7)
PROTHROMBIN TIME: 13 SECONDS (ref 9.6–11.7)
PROTHROMBIN TIME: 13.3 SECONDS (ref 9.6–11.7)
RBC # BLD AUTO: 2.48 10*6/MM3 (ref 4.14–5.8)
RBC # BLD AUTO: 2.6 10*6/MM3 (ref 4.14–5.8)
RBC # BLD AUTO: 2.89 10*6/MM3 (ref 4.14–5.8)
RBC # BLD AUTO: 4.07 10*6/MM3 (ref 4.14–5.8)
RESPIRATORY RATE: 12
RESPIRATORY RATE: 12
RESPIRATORY RATE: 14
RESPIRATORY RATE: 18
SAO2 % BLDCOA: 100 % (ref 95–98)
SAO2 % BLDCOA: 95 % (ref 94–98)
SAO2 % BLDCOA: 95.7 % (ref 94–98)
SAO2 % BLDCOA: 97.6 % (ref 94–98)
SAO2 % BLDCOA: 99 % (ref 94–98)
SAO2 % BLDCOA: 99.9 % (ref 94–98)
SAO2 % BLDCOV: 84 %
SODIUM BLD-SCNC: 138 MMOL/L (ref 136–145)
SODIUM BLD-SCNC: 140 MMOL/L (ref 136–145)
SODIUM BLDA-SCNC: 133 MMOL/L (ref 138–146)
SODIUM BLDA-SCNC: 135 MMOL/L (ref 138–146)
SODIUM BLDA-SCNC: 137 MMOL/L (ref 138–146)
SODIUM BLDA-SCNC: 138 MMOL/L (ref 138–146)
SODIUM BLDA-SCNC: 138 MMOL/L (ref 138–146)
SODIUM BLDA-SCNC: 139 MMOL/L (ref 138–146)
SODIUM BLDA-SCNC: 142 MMOL/L (ref 138–146)
SODIUM BLDA-SCNC: 142 MMOL/L (ref 138–146)
SODIUM BLDA-SCNC: 144 MMOL/L (ref 138–146)
SODIUM BLDA-SCNC: 144 MMOL/L (ref 138–146)
SODIUM BLDA-SCNC: 146 MMOL/L (ref 138–146)
TSH SERPL DL<=0.05 MIU/L-ACNC: 19.08 UIU/ML (ref 0.27–4.2)
UNIT  ABO: NORMAL
UNIT  ABO: NORMAL
UNIT  RH: NORMAL
UNIT  RH: NORMAL
VENTILATOR MODE: ABNORMAL
VT ON VENT VENT: 700 ML
WBC NRBC COR # BLD: 6.3 10*3/MM3 (ref 3.4–10.8)
WBC NRBC COR # BLD: 7.8 10*3/MM3 (ref 3.4–10.8)

## 2019-12-06 PROCEDURE — 84132 ASSAY OF SERUM POTASSIUM: CPT

## 2019-12-06 PROCEDURE — 33533 CABG ARTERIAL SINGLE: CPT | Performed by: THORACIC SURGERY (CARDIOTHORACIC VASCULAR SURGERY)

## 2019-12-06 PROCEDURE — 25010000002 HEPARIN (PORCINE) PER 1000 UNITS: Performed by: ANESTHESIOLOGY

## 2019-12-06 PROCEDURE — 25010000002 MAGNESIUM SULFATE 2 GM/50ML SOLUTION: Performed by: ANESTHESIOLOGY

## 2019-12-06 PROCEDURE — 33508 ENDOSCOPIC VEIN HARVEST: CPT | Performed by: PHYSICIAN ASSISTANT

## 2019-12-06 PROCEDURE — P9041 ALBUMIN (HUMAN),5%, 50ML: HCPCS | Performed by: NURSE PRACTITIONER

## 2019-12-06 PROCEDURE — C1751 CATH, INF, PER/CENT/MIDLINE: HCPCS | Performed by: ANESTHESIOLOGY

## 2019-12-06 PROCEDURE — P9012 CRYOPRECIPITATE EACH UNIT: HCPCS

## 2019-12-06 PROCEDURE — 85027 COMPLETE CBC AUTOMATED: CPT | Performed by: INTERNAL MEDICINE

## 2019-12-06 PROCEDURE — 02100Z9 BYPASS CORONARY ARTERY, ONE ARTERY FROM LEFT INTERNAL MAMMARY, OPEN APPROACH: ICD-10-PCS | Performed by: THORACIC SURGERY (CARDIOTHORACIC VASCULAR SURGERY)

## 2019-12-06 PROCEDURE — 82330 ASSAY OF CALCIUM: CPT | Performed by: NURSE PRACTITIONER

## 2019-12-06 PROCEDURE — 021009W BYPASS CORONARY ARTERY, ONE ARTERY FROM AORTA WITH AUTOLOGOUS VENOUS TISSUE, OPEN APPROACH: ICD-10-PCS | Performed by: THORACIC SURGERY (CARDIOTHORACIC VASCULAR SURGERY)

## 2019-12-06 PROCEDURE — C1713 ANCHOR/SCREW BN/BN,TIS/BN: HCPCS | Performed by: THORACIC SURGERY (CARDIOTHORACIC VASCULAR SURGERY)

## 2019-12-06 PROCEDURE — 85384 FIBRINOGEN ACTIVITY: CPT | Performed by: THORACIC SURGERY (CARDIOTHORACIC VASCULAR SURGERY)

## 2019-12-06 PROCEDURE — 25010000002 ONDANSETRON PER 1 MG: Performed by: INTERNAL MEDICINE

## 2019-12-06 PROCEDURE — 85610 PROTHROMBIN TIME: CPT | Performed by: THORACIC SURGERY (CARDIOTHORACIC VASCULAR SURGERY)

## 2019-12-06 PROCEDURE — 85347 COAGULATION TIME ACTIVATED: CPT

## 2019-12-06 PROCEDURE — 82330 ASSAY OF CALCIUM: CPT | Performed by: INTERNAL MEDICINE

## 2019-12-06 PROCEDURE — 94799 UNLISTED PULMONARY SVC/PX: CPT

## 2019-12-06 PROCEDURE — 85027 COMPLETE CBC AUTOMATED: CPT | Performed by: THORACIC SURGERY (CARDIOTHORACIC VASCULAR SURGERY)

## 2019-12-06 PROCEDURE — 84100 ASSAY OF PHOSPHORUS: CPT | Performed by: THORACIC SURGERY (CARDIOTHORACIC VASCULAR SURGERY)

## 2019-12-06 PROCEDURE — 25010000002 HEPARIN (PORCINE) PER 1000 UNITS: Performed by: THORACIC SURGERY (CARDIOTHORACIC VASCULAR SURGERY)

## 2019-12-06 PROCEDURE — 85025 COMPLETE CBC W/AUTO DIFF WBC: CPT | Performed by: NURSE PRACTITIONER

## 2019-12-06 PROCEDURE — 33533 CABG ARTERIAL SINGLE: CPT | Performed by: PHYSICIAN ASSISTANT

## 2019-12-06 PROCEDURE — 85018 HEMOGLOBIN: CPT

## 2019-12-06 PROCEDURE — 36430 TRANSFUSION BLD/BLD COMPNT: CPT

## 2019-12-06 PROCEDURE — 84100 ASSAY OF PHOSPHORUS: CPT | Performed by: INTERNAL MEDICINE

## 2019-12-06 PROCEDURE — 93005 ELECTROCARDIOGRAM TRACING: CPT | Performed by: NURSE PRACTITIONER

## 2019-12-06 PROCEDURE — 06BQ4ZZ EXCISION OF LEFT SAPHENOUS VEIN, PERCUTANEOUS ENDOSCOPIC APPROACH: ICD-10-PCS | Performed by: THORACIC SURGERY (CARDIOTHORACIC VASCULAR SURGERY)

## 2019-12-06 PROCEDURE — 63710000001 INSULIN REGULAR HUMAN PER 5 UNITS: Performed by: ANESTHESIOLOGY

## 2019-12-06 PROCEDURE — 80051 ELECTROLYTE PANEL: CPT

## 2019-12-06 PROCEDURE — 25010000002 MORPHINE PER 10 MG: Performed by: NURSE PRACTITIONER

## 2019-12-06 PROCEDURE — 25010000002 PROTAMINE SULFATE PER 10 MG: Performed by: ANESTHESIOLOGY

## 2019-12-06 PROCEDURE — 94002 VENT MGMT INPAT INIT DAY: CPT

## 2019-12-06 PROCEDURE — 82330 ASSAY OF CALCIUM: CPT

## 2019-12-06 PROCEDURE — 85576 BLOOD PLATELET AGGREGATION: CPT | Performed by: THORACIC SURGERY (CARDIOTHORACIC VASCULAR SURGERY)

## 2019-12-06 PROCEDURE — 85014 HEMATOCRIT: CPT

## 2019-12-06 PROCEDURE — 82947 ASSAY GLUCOSE BLOOD QUANT: CPT

## 2019-12-06 PROCEDURE — 36600 WITHDRAWAL OF ARTERIAL BLOOD: CPT

## 2019-12-06 PROCEDURE — 84443 ASSAY THYROID STIM HORMONE: CPT | Performed by: INTERNAL MEDICINE

## 2019-12-06 PROCEDURE — 33508 ENDOSCOPIC VEIN HARVEST: CPT | Performed by: THORACIC SURGERY (CARDIOTHORACIC VASCULAR SURGERY)

## 2019-12-06 PROCEDURE — 25010000002 CALCIUM GLUCONATE PER 10 ML: Performed by: ANESTHESIOLOGY

## 2019-12-06 PROCEDURE — 85610 PROTHROMBIN TIME: CPT | Performed by: NURSE PRACTITIONER

## 2019-12-06 PROCEDURE — 25010000002 PAPAVERINE PER 60 MG: Performed by: THORACIC SURGERY (CARDIOTHORACIC VASCULAR SURGERY)

## 2019-12-06 PROCEDURE — 5A1221Z PERFORMANCE OF CARDIAC OUTPUT, CONTINUOUS: ICD-10-PCS | Performed by: THORACIC SURGERY (CARDIOTHORACIC VASCULAR SURGERY)

## 2019-12-06 PROCEDURE — 86927 PLASMA FRESH FROZEN: CPT

## 2019-12-06 PROCEDURE — 85049 AUTOMATED PLATELET COUNT: CPT | Performed by: THORACIC SURGERY (CARDIOTHORACIC VASCULAR SURGERY)

## 2019-12-06 PROCEDURE — 80053 COMPREHEN METABOLIC PANEL: CPT | Performed by: INTERNAL MEDICINE

## 2019-12-06 PROCEDURE — 25010000003 CEFAZOLIN PER 500 MG: Performed by: ANESTHESIOLOGY

## 2019-12-06 PROCEDURE — 82962 GLUCOSE BLOOD TEST: CPT

## 2019-12-06 PROCEDURE — 25010000002 MIDAZOLAM PER 1 MG: Performed by: ANESTHESIOLOGY

## 2019-12-06 PROCEDURE — 33517 CABG ARTERY-VEIN SINGLE: CPT | Performed by: THORACIC SURGERY (CARDIOTHORACIC VASCULAR SURGERY)

## 2019-12-06 PROCEDURE — 25010000002 ALBUMIN HUMAN 5% PER 50 ML: Performed by: NURSE PRACTITIONER

## 2019-12-06 PROCEDURE — 82803 BLOOD GASES ANY COMBINATION: CPT

## 2019-12-06 PROCEDURE — 85576 BLOOD PLATELET AGGREGATION: CPT | Performed by: NURSE PRACTITIONER

## 2019-12-06 PROCEDURE — 25010000002 FENTANYL CITRATE (PF) 100 MCG/2ML SOLUTION: Performed by: ANESTHESIOLOGY

## 2019-12-06 PROCEDURE — 84295 ASSAY OF SERUM SODIUM: CPT

## 2019-12-06 PROCEDURE — 83735 ASSAY OF MAGNESIUM: CPT | Performed by: INTERNAL MEDICINE

## 2019-12-06 PROCEDURE — 85730 THROMBOPLASTIN TIME PARTIAL: CPT | Performed by: NURSE PRACTITIONER

## 2019-12-06 PROCEDURE — 71045 X-RAY EXAM CHEST 1 VIEW: CPT

## 2019-12-06 PROCEDURE — 25010000002 CALCIUM GLUCONATE-NACL 1-0.675 GM/50ML-% SOLUTION: Performed by: THORACIC SURGERY (CARDIOTHORACIC VASCULAR SURGERY)

## 2019-12-06 PROCEDURE — 76775 US EXAM ABDO BACK WALL LIM: CPT

## 2019-12-06 PROCEDURE — 85730 THROMBOPLASTIN TIME PARTIAL: CPT | Performed by: THORACIC SURGERY (CARDIOTHORACIC VASCULAR SURGERY)

## 2019-12-06 PROCEDURE — A4648 IMPLANTABLE TISSUE MARKER: HCPCS | Performed by: THORACIC SURGERY (CARDIOTHORACIC VASCULAR SURGERY)

## 2019-12-06 PROCEDURE — C1729 CATH, DRAINAGE: HCPCS | Performed by: THORACIC SURGERY (CARDIOTHORACIC VASCULAR SURGERY)

## 2019-12-06 PROCEDURE — 33517 CABG ARTERY-VEIN SINGLE: CPT | Performed by: PHYSICIAN ASSISTANT

## 2019-12-06 PROCEDURE — P9035 PLATELET PHERES LEUKOREDUCED: HCPCS

## 2019-12-06 PROCEDURE — 80053 COMPREHEN METABOLIC PANEL: CPT | Performed by: THORACIC SURGERY (CARDIOTHORACIC VASCULAR SURGERY)

## 2019-12-06 PROCEDURE — 25010000002 PROPOFOL 200 MG/20ML EMULSION: Performed by: ANESTHESIOLOGY

## 2019-12-06 DEVICE — CLIP LIGAT VASC HORIZON TI SM/WD RED 24CT: Type: IMPLANTABLE DEVICE | Site: STERNUM | Status: FUNCTIONAL

## 2019-12-06 DEVICE — SS SUTURE, 6 PER SLEEVE
Type: IMPLANTABLE DEVICE | Status: FUNCTIONAL
Brand: MYO/WIRE II

## 2019-12-06 DEVICE — CLIP LIGAT VASC HORIZON TI MD BLU 24CT: Type: IMPLANTABLE DEVICE | Site: STERNUM | Status: FUNCTIONAL

## 2019-12-06 RX ORDER — SODIUM CHLORIDE 9 MG/ML
30 INJECTION, SOLUTION INTRAVENOUS CONTINUOUS PRN
Status: DISCONTINUED | OUTPATIENT
Start: 2019-12-06 | End: 2019-12-06 | Stop reason: HOSPADM

## 2019-12-06 RX ORDER — AMINOCAPROIC ACID 250 MG/ML
INJECTION, SOLUTION INTRAVENOUS AS NEEDED
Status: DISCONTINUED | OUTPATIENT
Start: 2019-12-06 | End: 2019-12-06 | Stop reason: SURG

## 2019-12-06 RX ORDER — FENTANYL CITRATE 50 UG/ML
INJECTION, SOLUTION INTRAMUSCULAR; INTRAVENOUS AS NEEDED
Status: DISCONTINUED | OUTPATIENT
Start: 2019-12-06 | End: 2019-12-06 | Stop reason: SURG

## 2019-12-06 RX ORDER — VECURONIUM BROMIDE 1 MG/ML
INJECTION, POWDER, LYOPHILIZED, FOR SOLUTION INTRAVENOUS AS NEEDED
Status: DISCONTINUED | OUTPATIENT
Start: 2019-12-06 | End: 2019-12-06 | Stop reason: SURG

## 2019-12-06 RX ORDER — XYLITOL/YERBA SANTA
2 AEROSOL, SPRAY WITH PUMP (ML) MUCOUS MEMBRANE EVERY 4 HOURS PRN
Status: DISCONTINUED | OUTPATIENT
Start: 2019-12-06 | End: 2019-12-09

## 2019-12-06 RX ORDER — LEVOTHYROXINE SODIUM 0.05 MG/1
50 TABLET ORAL
Status: DISCONTINUED | OUTPATIENT
Start: 2019-12-06 | End: 2019-12-06 | Stop reason: HOSPADM

## 2019-12-06 RX ORDER — CEFAZOLIN SODIUM 1 G/3ML
INJECTION, POWDER, FOR SOLUTION INTRAMUSCULAR; INTRAVENOUS AS NEEDED
Status: DISCONTINUED | OUTPATIENT
Start: 2019-12-06 | End: 2019-12-06 | Stop reason: SURG

## 2019-12-06 RX ORDER — ONDANSETRON 2 MG/ML
4 INJECTION INTRAMUSCULAR; INTRAVENOUS EVERY 6 HOURS PRN
Status: DISCONTINUED | OUTPATIENT
Start: 2019-12-06 | End: 2019-12-10 | Stop reason: HOSPADM

## 2019-12-06 RX ORDER — ALBUMIN, HUMAN INJ 5% 5 %
SOLUTION INTRAVENOUS
Status: COMPLETED
Start: 2019-12-06 | End: 2019-12-06

## 2019-12-06 RX ORDER — ASPIRIN 81 MG/1
81 TABLET ORAL DAILY
Status: DISCONTINUED | OUTPATIENT
Start: 2019-12-07 | End: 2019-12-07

## 2019-12-06 RX ORDER — SODIUM CHLORIDE 9 MG/ML
75 INJECTION, SOLUTION INTRAVENOUS CONTINUOUS
Status: DISCONTINUED | OUTPATIENT
Start: 2019-12-06 | End: 2019-12-06 | Stop reason: HOSPADM

## 2019-12-06 RX ORDER — POTASSIUM CHLORIDE 7.45 MG/ML
INJECTION INTRAVENOUS
Status: COMPLETED
Start: 2019-12-06 | End: 2019-12-07

## 2019-12-06 RX ORDER — SODIUM CHLORIDE 9 MG/ML
30 INJECTION, SOLUTION INTRAVENOUS CONTINUOUS
Status: DISCONTINUED | OUTPATIENT
Start: 2019-12-06 | End: 2019-12-07

## 2019-12-06 RX ORDER — MIDAZOLAM HYDROCHLORIDE 1 MG/ML
INJECTION INTRAMUSCULAR; INTRAVENOUS AS NEEDED
Status: DISCONTINUED | OUTPATIENT
Start: 2019-12-06 | End: 2019-12-06 | Stop reason: SURG

## 2019-12-06 RX ORDER — POLYETHYLENE GLYCOL 3350 17 G/17G
17 POWDER, FOR SOLUTION ORAL 2 TIMES DAILY
Status: DISCONTINUED | OUTPATIENT
Start: 2019-12-07 | End: 2019-12-10 | Stop reason: HOSPADM

## 2019-12-06 RX ORDER — EPHEDRINE SULFATE/0.9% NACL/PF 25 MG/5 ML
SYRINGE (ML) INTRAVENOUS AS NEEDED
Status: DISCONTINUED | OUTPATIENT
Start: 2019-12-06 | End: 2019-12-06 | Stop reason: SURG

## 2019-12-06 RX ORDER — SODIUM CHLORIDE 0.9 % (FLUSH) 0.9 %
30 SYRINGE (ML) INJECTION ONCE AS NEEDED
Status: DISCONTINUED | OUTPATIENT
Start: 2019-12-06 | End: 2019-12-06

## 2019-12-06 RX ORDER — MAGNESIUM SULFATE 1 G/100ML
1 INJECTION INTRAVENOUS EVERY 8 HOURS
Status: COMPLETED | OUTPATIENT
Start: 2019-12-07 | End: 2019-12-07

## 2019-12-06 RX ORDER — POTASSIUM CHLORIDE 7.45 MG/ML
10 INJECTION INTRAVENOUS
Status: DISCONTINUED | OUTPATIENT
Start: 2019-12-06 | End: 2019-12-10 | Stop reason: HOSPADM

## 2019-12-06 RX ORDER — MORPHINE SULFATE 4 MG/ML
2 INJECTION, SOLUTION INTRAMUSCULAR; INTRAVENOUS
Status: DISCONTINUED | OUTPATIENT
Start: 2019-12-06 | End: 2019-12-09

## 2019-12-06 RX ORDER — POTASSIUM CHLORIDE 20 MEQ/1
20 TABLET, EXTENDED RELEASE ORAL AS NEEDED
Status: DISCONTINUED | OUTPATIENT
Start: 2019-12-07 | End: 2019-12-10 | Stop reason: HOSPADM

## 2019-12-06 RX ORDER — NICARDIPINE HYDROCHLORIDE 2.5 MG/ML
INJECTION INTRAVENOUS AS NEEDED
Status: DISCONTINUED | OUTPATIENT
Start: 2019-12-06 | End: 2019-12-06 | Stop reason: SURG

## 2019-12-06 RX ORDER — ALBUMIN, HUMAN INJ 5% 5 %
1000 SOLUTION INTRAVENOUS AS NEEDED
Status: DISCONTINUED | OUTPATIENT
Start: 2019-12-06 | End: 2019-12-07

## 2019-12-06 RX ORDER — HYDROCODONE BITARTRATE AND ACETAMINOPHEN 5; 325 MG/1; MG/1
1 TABLET ORAL EVERY 4 HOURS PRN
Status: DISCONTINUED | OUTPATIENT
Start: 2019-12-06 | End: 2019-12-10 | Stop reason: HOSPADM

## 2019-12-06 RX ORDER — ACETAMINOPHEN 650 MG/1
650 SUPPOSITORY RECTAL EVERY 6 HOURS
Status: DISCONTINUED | OUTPATIENT
Start: 2019-12-06 | End: 2019-12-07

## 2019-12-06 RX ORDER — NOREPINEPHRINE BIT/0.9 % NACL 8 MG/250ML
.02-.3 INFUSION BOTTLE (ML) INTRAVENOUS
Status: DISCONTINUED | OUTPATIENT
Start: 2019-12-06 | End: 2019-12-06 | Stop reason: HOSPADM

## 2019-12-06 RX ORDER — NALOXONE HCL 0.4 MG/ML
0.4 VIAL (ML) INJECTION
Status: DISCONTINUED | OUTPATIENT
Start: 2019-12-06 | End: 2019-12-09

## 2019-12-06 RX ORDER — MAGNESIUM SULFATE HEPTAHYDRATE 40 MG/ML
INJECTION, SOLUTION INTRAVENOUS AS NEEDED
Status: DISCONTINUED | OUTPATIENT
Start: 2019-12-06 | End: 2019-12-06 | Stop reason: SURG

## 2019-12-06 RX ORDER — POTASSIUM CHLORIDE 1.5 G/1.77G
20 POWDER, FOR SOLUTION ORAL AS NEEDED
Status: DISCONTINUED | OUTPATIENT
Start: 2019-12-07 | End: 2019-12-10 | Stop reason: HOSPADM

## 2019-12-06 RX ORDER — PHENYLEPHRINE HCL IN 0.9% NACL 0.5 MG/5ML
SYRINGE (ML) INTRAVENOUS AS NEEDED
Status: DISCONTINUED | OUTPATIENT
Start: 2019-12-06 | End: 2019-12-06 | Stop reason: SURG

## 2019-12-06 RX ORDER — MEPERIDINE HYDROCHLORIDE 25 MG/ML
25 INJECTION INTRAMUSCULAR; INTRAVENOUS; SUBCUTANEOUS EVERY 4 HOURS PRN
Status: ACTIVE | OUTPATIENT
Start: 2019-12-06 | End: 2019-12-07

## 2019-12-06 RX ORDER — PANTOPRAZOLE SODIUM 40 MG/1
40 TABLET, DELAYED RELEASE ORAL
Status: DISCONTINUED | OUTPATIENT
Start: 2019-12-07 | End: 2019-12-10 | Stop reason: HOSPADM

## 2019-12-06 RX ORDER — METOPROLOL TARTRATE 5 MG/5ML
INJECTION INTRAVENOUS AS NEEDED
Status: DISCONTINUED | OUTPATIENT
Start: 2019-12-06 | End: 2019-12-06 | Stop reason: SURG

## 2019-12-06 RX ORDER — CHLORHEXIDINE GLUCONATE 0.12 MG/ML
15 RINSE ORAL EVERY 12 HOURS
Status: DISCONTINUED | OUTPATIENT
Start: 2019-12-07 | End: 2019-12-10 | Stop reason: HOSPADM

## 2019-12-06 RX ORDER — DOCUSATE SODIUM 100 MG/1
100 CAPSULE, LIQUID FILLED ORAL 2 TIMES DAILY
Status: DISCONTINUED | OUTPATIENT
Start: 2019-12-07 | End: 2019-12-10 | Stop reason: HOSPADM

## 2019-12-06 RX ORDER — CALCIUM GLUCONATE 20 MG/ML
1 INJECTION, SOLUTION INTRAVENOUS ONCE
Status: COMPLETED | OUTPATIENT
Start: 2019-12-06 | End: 2019-12-06

## 2019-12-06 RX ORDER — SENNA PLUS 8.6 MG/1
1 TABLET ORAL 2 TIMES DAILY
Status: DISCONTINUED | OUTPATIENT
Start: 2019-12-07 | End: 2019-12-10 | Stop reason: HOSPADM

## 2019-12-06 RX ORDER — ATORVASTATIN CALCIUM 40 MG/1
40 TABLET, FILM COATED ORAL NIGHTLY
Status: DISCONTINUED | OUTPATIENT
Start: 2019-12-07 | End: 2019-12-07

## 2019-12-06 RX ORDER — CALCIUM GLUCONATE 94 MG/ML
INJECTION, SOLUTION INTRAVENOUS AS NEEDED
Status: DISCONTINUED | OUTPATIENT
Start: 2019-12-06 | End: 2019-12-06 | Stop reason: SURG

## 2019-12-06 RX ORDER — HEPARIN SODIUM 1000 [USP'U]/ML
INJECTION, SOLUTION INTRAVENOUS; SUBCUTANEOUS AS NEEDED
Status: DISCONTINUED | OUTPATIENT
Start: 2019-12-06 | End: 2019-12-06 | Stop reason: SURG

## 2019-12-06 RX ORDER — PROPOFOL 10 MG/ML
INJECTION, EMULSION INTRAVENOUS AS NEEDED
Status: DISCONTINUED | OUTPATIENT
Start: 2019-12-06 | End: 2019-12-06 | Stop reason: SURG

## 2019-12-06 RX ORDER — DEXMEDETOMIDINE HYDROCHLORIDE 4 UG/ML
.2-.7 INJECTION, SOLUTION INTRAVENOUS
Status: DISCONTINUED | OUTPATIENT
Start: 2019-12-06 | End: 2019-12-07

## 2019-12-06 RX ORDER — ACETAMINOPHEN 325 MG/1
650 TABLET ORAL EVERY 6 HOURS
Status: COMPLETED | OUTPATIENT
Start: 2019-12-06 | End: 2019-12-07

## 2019-12-06 RX ORDER — PANTOPRAZOLE SODIUM 40 MG/10ML
40 INJECTION, POWDER, LYOPHILIZED, FOR SOLUTION INTRAVENOUS ONCE
Status: COMPLETED | OUTPATIENT
Start: 2019-12-06 | End: 2019-12-06

## 2019-12-06 RX ORDER — ASPIRIN 325 MG
325 TABLET ORAL ONCE
Status: COMPLETED | OUTPATIENT
Start: 2019-12-06 | End: 2019-12-06

## 2019-12-06 RX ADMIN — CALCIUM GLUCONATE 1 G: 98 INJECTION, SOLUTION INTRAVENOUS at 17:07

## 2019-12-06 RX ADMIN — FENTANYL CITRATE 250 MCG: 50 INJECTION, SOLUTION INTRAMUSCULAR; INTRAVENOUS at 14:45

## 2019-12-06 RX ADMIN — PHENYLEPHRINE HYDROCHLORIDE 100 MCG: 10 INJECTION INTRAVENOUS at 15:51

## 2019-12-06 RX ADMIN — CALCIUM GLUCONATE 1 G: 20 INJECTION, SOLUTION INTRAVENOUS at 22:30

## 2019-12-06 RX ADMIN — MIDAZOLAM 2 MG: 1 INJECTION INTRAMUSCULAR; INTRAVENOUS at 16:05

## 2019-12-06 RX ADMIN — Medication 5 MG: at 15:52

## 2019-12-06 RX ADMIN — VECURONIUM BROMIDE 5 MG: 1 INJECTION, POWDER, LYOPHILIZED, FOR SOLUTION INTRAVENOUS at 16:50

## 2019-12-06 RX ADMIN — ONDANSETRON 4 MG: 2 INJECTION INTRAMUSCULAR; INTRAVENOUS at 05:36

## 2019-12-06 RX ADMIN — FENTANYL CITRATE 250 MCG: 50 INJECTION, SOLUTION INTRAMUSCULAR; INTRAVENOUS at 16:05

## 2019-12-06 RX ADMIN — LEVOTHYROXINE SODIUM 50 MCG: 50 TABLET ORAL at 08:19

## 2019-12-06 RX ADMIN — MIDAZOLAM 4 MG: 1 INJECTION INTRAMUSCULAR; INTRAVENOUS at 13:45

## 2019-12-06 RX ADMIN — HEPARIN SODIUM 26000 UNITS: 1000 INJECTION, SOLUTION INTRAVENOUS; SUBCUTANEOUS at 15:23

## 2019-12-06 RX ADMIN — ASPIRIN 81 MG: 81 TABLET, DELAYED RELEASE ORAL at 08:18

## 2019-12-06 RX ADMIN — AMLODIPINE BESYLATE 5 MG: 5 TABLET ORAL at 08:18

## 2019-12-06 RX ADMIN — MAGNESIUM SULFATE HEPTAHYDRATE 2 G: 40 INJECTION, SOLUTION INTRAVENOUS at 16:45

## 2019-12-06 RX ADMIN — VECURONIUM BROMIDE 5 MG: 1 INJECTION, POWDER, LYOPHILIZED, FOR SOLUTION INTRAVENOUS at 14:45

## 2019-12-06 RX ADMIN — CEFAZOLIN SODIUM 2 G: 1 INJECTION, POWDER, FOR SOLUTION INTRAMUSCULAR; INTRAVENOUS at 17:09

## 2019-12-06 RX ADMIN — CHLORHEXIDINE GLUCONATE 1 APPLICATION: 500 CLOTH TOPICAL at 06:30

## 2019-12-06 RX ADMIN — NOREPINEPHRINE BITARTRATE 0.02 MCG/KG/MIN: 1 INJECTION, SOLUTION, CONCENTRATE INTRAVENOUS at 19:50

## 2019-12-06 RX ADMIN — ALBUMIN HUMAN 750 ML: 0.05 INJECTION, SOLUTION INTRAVENOUS at 22:28

## 2019-12-06 RX ADMIN — AMINOCAPROIC ACID 10 G: 250 INJECTION, SOLUTION INTRAVENOUS at 14:14

## 2019-12-06 RX ADMIN — CHLORHEXIDINE GLUCONATE 0.12% ORAL RINSE 15 ML: 1.2 LIQUID ORAL at 01:21

## 2019-12-06 RX ADMIN — SODIUM CHLORIDE 2.5 MG/HR: 900 INJECTION, SOLUTION INTRAVENOUS at 18:02

## 2019-12-06 RX ADMIN — ALBUMIN HUMAN: 0.05 INJECTION, SOLUTION INTRAVENOUS at 17:40

## 2019-12-06 RX ADMIN — FENTANYL CITRATE 100 MCG: 50 INJECTION, SOLUTION INTRAMUSCULAR; INTRAVENOUS at 13:45

## 2019-12-06 RX ADMIN — ALBUMIN HUMAN: 0.05 INJECTION, SOLUTION INTRAVENOUS at 17:32

## 2019-12-06 RX ADMIN — SODIUM CHLORIDE 2 UNITS/HR: 900 INJECTION INTRAVENOUS at 15:37

## 2019-12-06 RX ADMIN — AMINOCAPROIC ACID 10 G: 250 INJECTION, SOLUTION INTRAVENOUS at 17:16

## 2019-12-06 RX ADMIN — SODIUM BICARBONATE 50 MEQ: 84 INJECTION, SOLUTION INTRAVENOUS at 22:30

## 2019-12-06 RX ADMIN — ALBUMIN HUMAN: 0.05 INJECTION, SOLUTION INTRAVENOUS at 19:30

## 2019-12-06 RX ADMIN — MUPIROCIN 1 APPLICATION: 20 OINTMENT TOPICAL at 22:30

## 2019-12-06 RX ADMIN — CEFAZOLIN SODIUM 2 G: 1 INJECTION, POWDER, FOR SOLUTION INTRAMUSCULAR; INTRAVENOUS at 14:08

## 2019-12-06 RX ADMIN — FENTANYL CITRATE 400 MCG: 50 INJECTION, SOLUTION INTRAMUSCULAR; INTRAVENOUS at 14:00

## 2019-12-06 RX ADMIN — VECURONIUM BROMIDE 10 MG: 1 INJECTION, POWDER, LYOPHILIZED, FOR SOLUTION INTRAVENOUS at 14:00

## 2019-12-06 RX ADMIN — NICARDIPINE HYDROCHLORIDE 2.5 MG: 2.5 INJECTION INTRAVENOUS at 17:35

## 2019-12-06 RX ADMIN — MORPHINE SULFATE 2 MG: 4 INJECTION INTRAVENOUS at 22:28

## 2019-12-06 RX ADMIN — MIDAZOLAM 2 MG: 1 INJECTION INTRAMUSCULAR; INTRAVENOUS at 15:51

## 2019-12-06 RX ADMIN — VECURONIUM BROMIDE 5 MG: 1 INJECTION, POWDER, LYOPHILIZED, FOR SOLUTION INTRAVENOUS at 15:16

## 2019-12-06 RX ADMIN — VECURONIUM BROMIDE 5 MG: 1 INJECTION, POWDER, LYOPHILIZED, FOR SOLUTION INTRAVENOUS at 17:37

## 2019-12-06 RX ADMIN — PROTAMINE SULFATE 300 MG: 10 INJECTION, SOLUTION INTRAVENOUS at 17:04

## 2019-12-06 RX ADMIN — SODIUM CHLORIDE 0.5 MCG/KG/HR: 900 INJECTION INTRAVENOUS at 14:11

## 2019-12-06 RX ADMIN — SODIUM CHLORIDE 30 ML/HR: 900 INJECTION, SOLUTION INTRAVENOUS at 22:34

## 2019-12-06 RX ADMIN — FENTANYL CITRATE 250 MCG: 50 INJECTION, SOLUTION INTRAMUSCULAR; INTRAVENOUS at 16:59

## 2019-12-06 RX ADMIN — MUPIROCIN 1 APPLICATION: 20 OINTMENT TOPICAL at 01:21

## 2019-12-06 RX ADMIN — ACETAMINOPHEN 650 MG: 325 TABLET ORAL at 22:30

## 2019-12-06 RX ADMIN — ASPIRIN 325 MG ORAL TABLET 325 MG: 325 PILL ORAL at 22:30

## 2019-12-06 RX ADMIN — MIDAZOLAM 2 MG: 1 INJECTION INTRAMUSCULAR; INTRAVENOUS at 16:59

## 2019-12-06 RX ADMIN — ATORVASTATIN CALCIUM 20 MG: 20 TABLET, FILM COATED ORAL at 08:18

## 2019-12-06 RX ADMIN — DEXMEDETOMIDINE HYDROCHLORIDE 0.7 MCG/KG/HR: 100 INJECTION, SOLUTION INTRAVENOUS at 22:38

## 2019-12-06 RX ADMIN — PROPOFOL 100 MG: 10 INJECTION, EMULSION INTRAVENOUS at 14:00

## 2019-12-06 RX ADMIN — SODIUM CHLORIDE: 0.9 INJECTION, SOLUTION INTRAVENOUS at 13:40

## 2019-12-06 RX ADMIN — SODIUM CHLORIDE: 0.9 INJECTION, SOLUTION INTRAVENOUS at 18:05

## 2019-12-06 RX ADMIN — ALPRAZOLAM 0.5 MG: 0.5 TABLET ORAL at 06:39

## 2019-12-06 RX ADMIN — METOPROLOL TARTRATE 2 MG: 5 INJECTION INTRAVENOUS at 14:24

## 2019-12-06 RX ADMIN — PANTOPRAZOLE SODIUM 40 MG: 40 INJECTION, POWDER, FOR SOLUTION INTRAVENOUS at 22:30

## 2019-12-06 NOTE — ANESTHESIA PROCEDURE NOTES
Arterial Line      Patient location during procedure: OR  Start time: 12/6/2019 1:45 PM  Stop Time:12/6/2019 1:55 PM       Line placed for respiratory failure, hemodynamic monitoring, ABGs/Labs/ISTAT and MD/Surgeon request.  Performed By   Anesthesiologist: Hitesh Greenwood MD  Preanesthetic Checklist  Completed: patient identified, site marked, surgical consent, pre-op evaluation, timeout performed, IV checked, risks and benefits discussed and monitors and equipment checked  Arterial Line Prep   Sterile Tech: cap, gloves, gown and mask  Prep: ChloraPrep  Patient monitoring: blood pressure monitoring, continuous pulse oximetry and EKG  Arterial Line Procedure   Laterality:right  Location:  radial artery  Catheter size: 20 G   Guidance: ultrasound guided  Number of attempts: 2 (MULTIPLE)  Successful placement: yes  Post Assessment   Dressing Type: occlusive dressing applied, secured with tape and wrist guard applied.   Complications no  Circ/Move/Sens Assessment: normal.   Patient Tolerance: patient tolerated the procedure well with no apparent complications  Additional Notes  MULTIPLE ATTEMPTS BILATERAL. VERY SMALL CALIBER  R VIA US GUIDANCE.  GOOD DANYELLE BILATERAL NO COMPLICATIONS EVIDENT

## 2019-12-06 NOTE — PROGRESS NOTES
"NEPHROLOGY PROGRESS NOTE------[facility]    [facility]  [phone]  Luis Goff MD      Patient Care Team:  Sandra Cha PA as PCP - General  James Hernandez MD as Consulting Physician (Cardiology)  Marilyn Goff MD as Consulting Physician (Nephrology)      Provider:  Luis Goff MD  Patient Name: Duke Freire  :  1960    SUBJECTIVE:    Patient awaiting CABG today. No SOB or active angina. No dysuria.     Medication:    amLODIPine 5 mg Oral Daily   aspirin 81 mg Oral Daily   atorvastatin 20 mg Oral Daily   ceFAZolin 2 g Intravenous Once   metoprolol succinate XL 25 mg Oral Nightly   metoprolol tartrate 12.5 mg Oral Once       insulin 0-50 Units/hr    sodium chloride 60 mL/hr Last Rate: 60 mL/hr (19 1530)   sodium chloride 30 mL/hr        OBJECTIVE    Vital Sign Min/Max for last 24 hours  Temp  Min: 98.1 °F (36.7 °C)  Max: 98.1 °F (36.7 °C)   BP  Min: 96/61  Max: 138/84   Pulse  Min: 57  Max: 74   Resp  Min: 16  Max: 16   SpO2  Min: 95 %  Max: 97 %   No Data Recorded   Weight  Min: 85.2 kg (187 lb 13.3 oz)  Max: 87.8 kg (193 lb 9 oz)     Flowsheet Rows      First Filed Value   Admission Height  177.8 cm (70\") Documented at 2019 1632   Admission Weight  87.9 kg (193 lb 12.6 oz) Documented at 2019 1632          No intake/output data recorded.  I/O last 3 completed shifts:  In: 380 [P.O.:120; I.V.:260]  Out: 800 [Urine:800]    Physical Exam:  General Appearance: alert, appears stated age and cooperative  Head: normocephalic, without obvious abnormality and atraumatic  Eyes: conjunctivae and sclerae normal and no icterus  Neck: supple and no JVD  Lungs: clear to auscultation and respirations regular  Heart: regular rhythm & normal rate and normal S1, S2  Chest: Wall no abnormalities observed  Abdomen: normal bowel sounds and soft non-tender  Extremities: moves extremities well, no edema, no cyanosis " and no redness  Skin: no bleeding, bruising or rash, turgor normal, color normal and no lesions noted  Neurologic: Alert, and oriented. No focal deficits    Labs:    WBC WBC   Date Value Ref Range Status   12/06/2019 6.30 3.40 - 10.80 10*3/mm3 Final   12/05/2019 6.50 3.40 - 10.80 10*3/mm3 Final   12/04/2019 6.00 3.40 - 10.80 10*3/mm3 Final      HGB Hemoglobin   Date Value Ref Range Status   12/06/2019 13.7 13.0 - 17.7 g/dL Final   12/05/2019 14.5 13.0 - 17.7 g/dL Final   12/04/2019 13.9 13.0 - 17.7 g/dL Final      HCT Hematocrit   Date Value Ref Range Status   12/06/2019 38.1 37.5 - 51.0 % Final   12/05/2019 40.2 37.5 - 51.0 % Final   12/04/2019 39.5 37.5 - 51.0 % Final      Platlets No results found for: LABPLAT   MCV MCV   Date Value Ref Range Status   12/06/2019 93.5 79.0 - 97.0 fL Final   12/05/2019 92.7 79.0 - 97.0 fL Final   12/04/2019 93.0 79.0 - 97.0 fL Final          Sodium Sodium   Date Value Ref Range Status   12/06/2019 138 136 - 145 mmol/L Final   12/05/2019 140 136 - 145 mmol/L Final   12/04/2019 142 136 - 145 mmol/L Final      Potassium Potassium   Date Value Ref Range Status   12/06/2019 4.2 3.5 - 5.2 mmol/L Final   12/05/2019 4.6 3.5 - 5.2 mmol/L Final   12/04/2019 4.6 3.5 - 5.2 mmol/L Final      Chloride Chloride   Date Value Ref Range Status   12/06/2019 101 98 - 107 mmol/L Final   12/05/2019 106 98 - 107 mmol/L Final   12/04/2019 105 98 - 107 mmol/L Final      CO2 CO2   Date Value Ref Range Status   12/06/2019 28.0 22.0 - 29.0 mmol/L Final   12/05/2019 25.0 22.0 - 29.0 mmol/L Final   12/04/2019 29.0 22.0 - 29.0 mmol/L Final      BUN BUN   Date Value Ref Range Status   12/06/2019 15 6 - 20 mg/dL Final   12/05/2019 22 (H) 6 - 20 mg/dL Final   12/04/2019 25 (H) 6 - 20 mg/dL Final      Creatinine Creatinine   Date Value Ref Range Status   12/06/2019 1.13 0.76 - 1.27 mg/dL Final   12/05/2019 1.10 0.76 - 1.27 mg/dL Final   12/04/2019 1.43 (H) 0.76 - 1.27 mg/dL Final      Calcium Calcium   Date Value  Ref Range Status   12/06/2019 8.9 8.6 - 10.5 mg/dL Final   12/05/2019 8.9 8.6 - 10.5 mg/dL Final   12/04/2019 9.4 8.6 - 10.5 mg/dL Final      PO4 No components found for: PO4   Albumin Albumin   Date Value Ref Range Status   12/06/2019 4.00 3.50 - 5.20 g/dL Final   12/05/2019 3.90 3.50 - 5.20 g/dL Final      Magnesium Magnesium   Date Value Ref Range Status   12/06/2019 2.1 1.6 - 2.6 mg/dL Final      Uric Acid No components found for: URIC ACID     Imaging Results (Last 72 Hours)     Procedure Component Value Units Date/Time    US Renal Bilateral [[id number]] Collected:  12/05/19 2331     Updated:  12/06/19 0133    Narrative:       EXAMINATION: Complete retroperitoneal ultrasound examination.    INDICATIONS: Chronic kidney disease    TECHNIQUE: Ultrasound of the kidneys and urinary bladder was performed.    COMPARISON: None    FINDINGS:    The right kidney demonstrates cortical thinning without hydronephrosis, measuring 10.7 cm. No sonographically detectable mass is identified.    The left kidney also exhibits cortical thinning without hydronephrosis, measuring 11.3 cm. No sonographically detectable mass is identified.    The urinary bladder is collapsed and not imaged.      Impression:         1. No evidence of urinary obstruction.  2. Cortical thinning of the kidneys, compatible with chronic kidney disease.    Electronically signed by:  Jacob Wilson M.D.    12/5/2019 11:32 PM    XR Chest 1 View [[id number]] Collected:  12/05/19 1609     Updated:  12/05/19 1611    Narrative:       DATE OF EXAM:  12/5/2019 3:56 PM     PROCEDURE:  XR CHEST 1 VW-     INDICATIONS:  Cardiac Surgery       COMPARISON:  None     TECHNIQUE:   Single radiographic view of the chest was obtained.     FINDINGS:  No acute airspace disease. Heart size is normal. No pleural effusion or  pneumothorax or acute osseous abnormality.       Impression:       No acute chest findings.     Electronically Signed By-Dr. Sayra Anand MD On:12/5/2019 4:09  PM  This report was finalized on [id number] by Dr. Sayra Anand MD.          Results for orders placed during the hospital encounter of 12/04/19   XR Chest 1 View    Narrative DATE OF EXAM:  12/5/2019 3:56 PM     PROCEDURE:  XR CHEST 1 VW-     INDICATIONS:  Cardiac Surgery       COMPARISON:  None     TECHNIQUE:   Single radiographic view of the chest was obtained.     FINDINGS:  No acute airspace disease. Heart size is normal. No pleural effusion or  pneumothorax or acute osseous abnormality.       Impression No acute chest findings.     Electronically Signed By-Dr. Sayra Anand MD On:12/5/2019 4:09 PM  This report was finalized on [id number] by Dr. Sayra Anand MD.       Results for orders placed during the hospital encounter of 12/04/19   Duplex Vein Mapping Lower Extremity - Bilateral CAR    Narrative · The right greater saphenous vein is patent and of adequate size in the   thigh.  · The left greater saphenous vein is patent and of adequate size in the   thigh.  · The left greater saphenous vein is patent and of adequate size in the   calf.  · The right greater saphenous vein is patent and of adequate size in the   calf.            ASSESSMENT / PLAN      Abnormal nuclear stress test    Angina pectoris (CMS/HCC)    Coronary artery disease involving native coronary artery of native heart with unstable angina pectoris (CMS/HCC)    Essential hypertension    1. ARF/ZAIN/CRF/CKD STG 2------Nonoliguric. +ARF/ZAIN on top of what appears to be CRF/CKD STG 2, with a baseline serum creatinine of 1.1. Unknown if patient has baseline proteinuria or hematuria. CRF/CKD STG 2 is likely secondary to HTN NS. +ARF/ZAIN is already better and likely was secondary to slight prerenal state with concomitant ACE-I use. RENAL US showed cortical thinning compatible with chronic medical kidney disease.  Keep off of ACE-I for now. Hydrate in preparation for CABG and given recent IV dye exposure.  No NSAIDs or further IV dye.  Patient has been explained the risks of CABG, with regards to CKD     2. HTN WITH CKD-------Avoid hypotension. Very gradual BP reduction. No ACE/ARB/DRI/diuretic for now     3. CAD S/P CATH------Multivessel disease. CABG eval underway     4. HYPERLIPIDEMIA------On Statin. TSH 19.08. CK ok. Will start synthroid post operatively.      5. OA/DJD------No NSAIDs.       6. HYPOTHYROIDISM--- Will start synthroid post operatively             Luis Goff MD  [facility]  [phone]  12/06/19  8:02 AM

## 2019-12-06 NOTE — ANESTHESIA PREPROCEDURE EVALUATION
Anesthesia Evaluation     Patient summary reviewed and Nursing notes reviewed   NPO Solid Status: > 8 hours  NPO Liquid Status: > 8 hours           Airway   Mallampati: I  TM distance: >3 FB  Neck ROM: full  No difficulty expected  Dental - normal exam     Pulmonary - negative pulmonary ROS and normal exam   Cardiovascular - normal exam    (+) hypertension, CAD, angina,       Neuro/Psych- negative ROS  GI/Hepatic/Renal/Endo - negative ROS     Musculoskeletal (-) negative ROS    Abdominal  - normal exam    Bowel sounds: normal.   Substance History - negative use     OB/GYN negative ob/gyn ROS         Other                        Anesthesia Plan    ASA 4     general   (DISCUSSED GA LINES JOSE JUAN TRANSFUSION POSTOP VENT QUESTIONS ADDRESSED)  intravenous induction   Postoperative Plan: Expected vent after surgery  Anesthetic plan, all risks, benefits, and alternatives have been provided, discussed and informed consent has been obtained with: patient.  Use of blood products discussed with patient  Consented to blood products.

## 2019-12-06 NOTE — ANESTHESIA PROCEDURE NOTES
Central Line      Patient location during procedure: OR  Start time: 12/6/2019 2:15 PM  Stop Time:12/6/2019 2:17 PM  Indications: central pressure monitoring, vascular access and MD/Surgeon request  Staff  Anesthesiologist: Hitesh Greenwood MD  Preanesthetic Checklist  Completed: patient identified, site marked, surgical consent, pre-op evaluation, timeout performed, IV checked, risks and benefits discussed and monitors and equipment checked  Central Line Prep  Sterile Tech:cap, gloves, gown, mask and sterile barriers  Prep: chloraprep  Patient monitoring: blood pressure monitoring, continuous pulse oximetry and EKG  Central Line Procedure  Laterality:right  Location:internal jugular  Catheter Type:Fortuna-Prabhakar  Assessment  Assessement:blood return through all ports and free fluid flow  Complications:no  Additional Notes  THROUGH PREVIOUSLY PLACED CORDIS. NEW GLOVES, DRAPE. X1 TO PA 46CM NO WEDGE ATTEMPTED

## 2019-12-06 NOTE — ANESTHESIA PROCEDURE NOTES
Central Line      Patient location during procedure: OR  Start time: 12/6/2019 2:05 PM  Stop Time:12/6/2019 2:15 PM  Indications: central pressure monitoring, vascular access and MD/Surgeon request  Staff  Anesthesiologist: Hitesh Greenwood MD  Preanesthetic Checklist  Completed: patient identified, site marked, surgical consent, pre-op evaluation, timeout performed, IV checked, risks and benefits discussed and monitors and equipment checked  Central Line Prep  Sterile Tech:cap, gloves, gown, mask and sterile barriers  Prep: chloraprep  Patient monitoring: blood pressure monitoring, continuous pulse oximetry and EKG  Central Line Procedure  Laterality:right  Location:internal jugular  Catheter Type:Cordis and double lumen  Catheter Size:9 Fr  Guidance:ultrasound guided  PROCEDURE NOTE/ULTRASOUND INTERPRETATION.  Using ultrasound guidance the potential vascular sites for insertion of the catheter were visualized to determine the patency of the vessel to be used for vascular access.  After selecting the appropriate site for insertion, the needle was visualized under ultrasound being inserted into the internal jugular vein, followed by ultrasound confirmation of wire and catheter placement. There were no abnormalities seen on ultrasound; an image was taken; and the patient tolerated the procedure with no complications. Images: still images obtained, printed/placed on chart  Assessment  Post procedure:line sutured and occlusive dressing applied  Assessement:blood return through all ports, free fluid flow and Nils Test  Complications:no  Patient Tolerance:patient tolerated the procedure well with no apparent complications  Additional Notes  X1 VIA ASEPTIC SELDINGER TECH THRU ANTERIOR APPROACH US GUIDANCE

## 2019-12-06 NOTE — ANESTHESIA PROCEDURE NOTES
Airway  Urgency: elective    Date/Time: 12/6/2019 2:03 PM  End Time:12/6/2019 2:03 PM  Airway not difficult    General Information and Staff    Patient location during procedure: OR  Anesthesiologist: Hitesh Greenwood MD    Indications and Patient Condition  Indications for airway management: airway protection    Preoxygenated: yes  MILS maintained throughout  Mask difficulty assessment: 1 - vent by mask    Final Airway Details  Final airway type: endotracheal airway      Successful airway: ETT  Cuffed: yes   Successful intubation technique: direct laryngoscopy  Endotracheal tube insertion site: oral  Blade: Dorcas  Blade size: 3  ETT size (mm): 8.0  Cormack-Lehane Classification: grade III - view of epiglottis only  Placement verified by: chest auscultation and capnometry   Measured from: teeth  ETT/EBT  to teeth (cm): 23  Number of attempts at approach: 1  Assessment: lips, teeth, and gum same as pre-op and atraumatic intubation    Additional Comments  ATRAUMATIC.  TEETH IN PREOP CONDITION.  CUFF TO MINIMUM OCCLUSIVE CUFF PRESSURE. GAUZE BITE BLOCK

## 2019-12-06 NOTE — OP NOTE
Date of procedure: 12/6/2019.    PreOp Diagnosis: Unstable angina, severe in-stent stenosis involving LAD and Diag1, HTN.    PostOp Diagnosis: Same.    Procedure: Urgent CABG x2 with LIMA to distal LAD, SV to Diag1, LLE EVH, intraop JOSE JUAN, PRP application to LIMA bed and sternum.    Surgeon: Laverne Boyle MD.    Assistant: THEO Allan.    Cardiologist: GUCCI Hernandez MD.    Anesthesia: GET.    Findings: No ath plaque on aortic palpation, consistent with JOSE JUAN. JOSE JUAN no valvular pathology, LVEF 40%. LIMA and SV adequate. Diag1 1.5 mm, LAD 1.75 mm. Coagulopathy with Fibrinogen 144 and Plt agg 84% (Plt count 117). I reopened the sternum in order to check for any surgical bleeding; the patient was closed uneventfully.    Aortic X time: 46 minutes.    CPB time: 65 minutes.    Cellsaver:     Antegrade and retrograde cardioplegia.    Arterial: 20F to Aorta.    Venous: 29/37F to RA.    HPI: This is a pleasant 58-year-old  gentleman admitted with unstable anginal symptoms.  He recently underwent stenting to his diagonal branch.  Repeat left heart catheterization on this admission showed in-stent stenosis but also with compromise of the LAD itself.  After reviewing the case with cardiology we decided that the best option for him would be surgical revascularization.  His STS risks for morbidity mortality were calculated and discussed.  He was counseled and consented for coronary bypass.      Details: The patient was identified in the prep and holding area.  He was taken to the OR.  Following intubation he received a PA catheter, radial arterial line, and Husain catheter.  He was prepped and draped.  A midline sternotomy site was made.  The sternum was split with electric saw.  The left hemisternum was retracted.  The left internal mammary artery was harvested as a pedicle.  The patient was heparinized.  The pedicle was transected distally; the pedicle was fashioned and the distal stump was controlled; left pleural chest  tube was placed.  During LIMA harvest the patient underwent endoscopic exploration of his left lower extremity; saphenous vein conduit was harvested.  The sternum was retracted.  The pericardium was opened and a well created.  The aorta was palpated and found to be free of any worrisome atherosclerotic plaque, consistent with the JOSE JUAN.  JOSE JUAN also showed no valvular pathology and a borderline normal left ventricular ejection fraction.  ACT was confirmed.  The ascending aorta and right atrium were cannulated.  Antegrade and retrograde cardioplegia lines were inserted.    Cardiopulmonary bypass instituted and the patient was cooled to 34 °C.  An aortic cross-clamp was applied and the patient received 800 mL of antegrade and 200 mL of retrograde cardioplegia, resulting in diastolic arrest.  At regular intervals the patient received additional retrograde cardioplegia doses.  The heart was retracted.  The diagonal 1 branch was identified and an arteriotomy was created.  The saphenous vein segment was anastomosed to this; this was hemostatic.  The proximal end of the saphenous vein segment was anastomosed to an aortotomy and marked with a metallic ring.  A pericardial window is created and used to deliver LIMA pedicle.  The distal LAD arteriotomy was created and anastomosed to the LIMA and.  This was hemostatic.  The pedicle was anchored to the epicardial surface.  After a hotshot antegrade cardioplegia dose the aortic cross-clamp was removed and the bulldog clamp was removed from the LIMA pedicle.  The patient recovered into sinus rhythm.    The saphenous vein was de-aired and his bulla clamp was removed.  The proximal and distal ecchymoses were found to be hemostatic.  Pacing wires were applied to the right atrium and right ventricle.  The retrograde cannula was removed.  After sufficient de-airing the antegrade cardioplegia line/vent was removed.  The patient was weaned off cardiopulmonary bypass.    The venous line was  removed.  A protamine dose was given and the arterial line was removed.  Cannulation sites were reinforced.  There were several areas where the aortic adventitia had bleeding; these were controlled with 4 Prolene sutures x2.  The pericardium was approximated loosely.  Substernal chest tube was placed.  A total of 8 steel wires were applied to sternum and used to approximated.  Sponge counts, instrument counts, and needle counts were correct.  The patient at this point was found to have significant chest tube output.  The sternotomy was reopened.  Several bleeders were noted in the LIMA bed which were controlled with electrocautery.  No sutures were required.  All anastomoses were hemostatic.  All cannulation sites were hemostatic.  The pericardium was reapproximated.  A total of 8 steel wires were used to approximate the sternum.  Once again, sponge counts, instrument counts, and needle counts were correct.  The fascia layer, subdermal layer, and subcuticular were approximated.  The leg incision were closed in similar fashion.  Bandages were applied.  Patient was transferred to the CVICU.

## 2019-12-07 ENCOUNTER — APPOINTMENT (OUTPATIENT)
Dept: GENERAL RADIOLOGY | Facility: HOSPITAL | Age: 59
End: 2019-12-07

## 2019-12-07 LAB
ALBUMIN SERPL-MCNC: 4.6 G/DL (ref 3.5–5.2)
ANION GAP SERPL CALCULATED.3IONS-SCNC: 12 MMOL/L (ref 5–15)
ARTERIAL PATENCY WRIST A: ABNORMAL
ATMOSPHERIC PRESS: ABNORMAL MM[HG]
BASE EXCESS BLDA CALC-SCNC: 3.4 MMOL/L (ref 0–3)
BDY SITE: ABNORMAL
BUN BLD-MCNC: 18 MG/DL (ref 6–20)
BUN/CREAT SERPL: 13.1 (ref 7–25)
CA-I BLDA-SCNC: 1.24 MMOL/L (ref 1.15–1.33)
CA-I SERPL ISE-MCNC: 1.28 MMOL/L (ref 1.2–1.3)
CALCIUM SPEC-SCNC: 9.4 MG/DL (ref 8.6–10.5)
CHLORIDE SERPL-SCNC: 108 MMOL/L (ref 98–107)
CO2 BLDA-SCNC: 30.2 MMOL/L (ref 22–29)
CO2 SERPL-SCNC: 27 MMOL/L (ref 22–29)
CREAT BLD-MCNC: 1.37 MG/DL (ref 0.76–1.27)
DEPRECATED RDW RBC AUTO: 43.8 FL (ref 37–54)
ERYTHROCYTE [DISTWIDTH] IN BLOOD BY AUTOMATED COUNT: 13.5 % (ref 12.3–15.4)
GFR SERPL CREATININE-BSD FRML MDRD: 54 ML/MIN/1.73
GLUCOSE BLD-MCNC: 137 MG/DL (ref 65–99)
GLUCOSE BLDC GLUCOMTR-MCNC: 101 MG/DL (ref 70–105)
GLUCOSE BLDC GLUCOMTR-MCNC: 112 MG/DL (ref 70–105)
GLUCOSE BLDC GLUCOMTR-MCNC: 115 MG/DL (ref 70–105)
GLUCOSE BLDC GLUCOMTR-MCNC: 119 MG/DL (ref 70–105)
GLUCOSE BLDC GLUCOMTR-MCNC: 122 MG/DL (ref 70–105)
GLUCOSE BLDC GLUCOMTR-MCNC: 124 MG/DL (ref 70–105)
GLUCOSE BLDC GLUCOMTR-MCNC: 126 MG/DL (ref 70–105)
GLUCOSE BLDC GLUCOMTR-MCNC: 130 MG/DL (ref 70–105)
GLUCOSE BLDC GLUCOMTR-MCNC: 132 MG/DL (ref 70–105)
GLUCOSE BLDC GLUCOMTR-MCNC: 141 MG/DL (ref 74–100)
GLUCOSE BLDC GLUCOMTR-MCNC: 146 MG/DL (ref 70–105)
HCO3 BLDA-SCNC: 28.7 MMOL/L (ref 21–28)
HCT VFR BLD AUTO: 19.3 % (ref 37.5–51)
HCT VFR BLDA CALC: 17 % (ref 38–51)
HEMODILUTION: NO
HGB BLD-MCNC: 7.1 G/DL (ref 13–17.7)
HGB BLDA-MCNC: 5.9 G/DL (ref 12–17)
HOROWITZ INDEX BLD+IHG-RTO: 40 %
MAGNESIUM SERPL-MCNC: 2.8 MG/DL (ref 1.6–2.6)
MCH RBC QN AUTO: 33.3 PG (ref 26.6–33)
MCHC RBC AUTO-ENTMCNC: 36.5 G/DL (ref 31.5–35.7)
MCV RBC AUTO: 91.2 FL (ref 79–97)
MODALITY: ABNORMAL
PCO2 BLDA: 48.1 MM HG (ref 35–48)
PEEP RESPIRATORY: 5 CM[H2O]
PH BLDA: 7.38 PH UNITS (ref 7.35–7.45)
PHOSPHATE SERPL-MCNC: 1.2 MG/DL (ref 2.5–4.5)
PLATELET # BLD AUTO: 190 10*3/MM3 (ref 140–450)
PMV BLD AUTO: 7.8 FL (ref 6–12)
PO2 BLDA: 98.3 MM HG (ref 83–108)
POTASSIUM BLD-SCNC: 3.6 MMOL/L (ref 3.5–5.2)
POTASSIUM BLD-SCNC: 4.3 MMOL/L (ref 3.5–5.2)
POTASSIUM BLDA-SCNC: 3.4 MMOL/L (ref 3.5–4.5)
PSV: 10 CMH2O
RBC # BLD AUTO: 2.12 10*6/MM3 (ref 4.14–5.8)
SAO2 % BLDCOA: 97.4 % (ref 94–98)
SODIUM BLD-SCNC: 147 MMOL/L (ref 136–145)
SODIUM BLDA-SCNC: 146 MMOL/L (ref 138–146)
VENTILATOR MODE: ABNORMAL
VT ON VENT VENT: 700 ML
WBC NRBC COR # BLD: 7.3 10*3/MM3 (ref 3.4–10.8)

## 2019-12-07 PROCEDURE — 36430 TRANSFUSION BLD/BLD COMPNT: CPT

## 2019-12-07 PROCEDURE — 82330 ASSAY OF CALCIUM: CPT | Performed by: NURSE PRACTITIONER

## 2019-12-07 PROCEDURE — 82962 GLUCOSE BLOOD TEST: CPT

## 2019-12-07 PROCEDURE — P9041 ALBUMIN (HUMAN),5%, 50ML: HCPCS | Performed by: NURSE PRACTITIONER

## 2019-12-07 PROCEDURE — 83735 ASSAY OF MAGNESIUM: CPT | Performed by: NURSE PRACTITIONER

## 2019-12-07 PROCEDURE — 25010000002 ALBUMIN HUMAN 5% PER 50 ML: Performed by: NURSE PRACTITIONER

## 2019-12-07 PROCEDURE — 94799 UNLISTED PULMONARY SVC/PX: CPT

## 2019-12-07 PROCEDURE — 25010000003 POTASSIUM CHLORIDE 10 MEQ/100ML SOLUTION

## 2019-12-07 PROCEDURE — 94003 VENT MGMT INPAT SUBQ DAY: CPT

## 2019-12-07 PROCEDURE — 85018 HEMOGLOBIN: CPT

## 2019-12-07 PROCEDURE — 82330 ASSAY OF CALCIUM: CPT

## 2019-12-07 PROCEDURE — 85027 COMPLETE CBC AUTOMATED: CPT | Performed by: NURSE PRACTITIONER

## 2019-12-07 PROCEDURE — 99024 POSTOP FOLLOW-UP VISIT: CPT | Performed by: NURSE PRACTITIONER

## 2019-12-07 PROCEDURE — 25010000003 POTASSIUM CHLORIDE 10 MEQ/100ML SOLUTION: Performed by: NURSE PRACTITIONER

## 2019-12-07 PROCEDURE — 97530 THERAPEUTIC ACTIVITIES: CPT

## 2019-12-07 PROCEDURE — 80069 RENAL FUNCTION PANEL: CPT | Performed by: NURSE PRACTITIONER

## 2019-12-07 PROCEDURE — 80051 ELECTROLYTE PANEL: CPT

## 2019-12-07 PROCEDURE — 82803 BLOOD GASES ANY COMBINATION: CPT

## 2019-12-07 PROCEDURE — 25010000002 MAGNESIUM SULFATE IN D5W 1G/100ML (PREMIX) 1-5 GM/100ML-% SOLUTION: Performed by: NURSE PRACTITIONER

## 2019-12-07 PROCEDURE — 25010000002 MORPHINE PER 10 MG: Performed by: NURSE PRACTITIONER

## 2019-12-07 PROCEDURE — 84132 ASSAY OF SERUM POTASSIUM: CPT | Performed by: NURSE PRACTITIONER

## 2019-12-07 PROCEDURE — 25010000002 CEFAZOLIN PER 500 MG: Performed by: INTERNAL MEDICINE

## 2019-12-07 PROCEDURE — 86900 BLOOD TYPING SEROLOGIC ABO: CPT

## 2019-12-07 PROCEDURE — 93005 ELECTROCARDIOGRAM TRACING: CPT | Performed by: NURSE PRACTITIONER

## 2019-12-07 PROCEDURE — 99233 SBSQ HOSP IP/OBS HIGH 50: CPT | Performed by: INTERNAL MEDICINE

## 2019-12-07 PROCEDURE — P9016 RBC LEUKOCYTES REDUCED: HCPCS

## 2019-12-07 PROCEDURE — 97165 OT EVAL LOW COMPLEX 30 MIN: CPT

## 2019-12-07 PROCEDURE — 71045 X-RAY EXAM CHEST 1 VIEW: CPT

## 2019-12-07 RX ORDER — SODIUM CHLORIDE 450 MG/100ML
75 INJECTION, SOLUTION INTRAVENOUS CONTINUOUS
Status: DISCONTINUED | OUTPATIENT
Start: 2019-12-07 | End: 2019-12-08

## 2019-12-07 RX ORDER — ASPIRIN 325 MG
325 TABLET, DELAYED RELEASE (ENTERIC COATED) ORAL DAILY
Status: DISCONTINUED | OUTPATIENT
Start: 2019-12-08 | End: 2019-12-10 | Stop reason: HOSPADM

## 2019-12-07 RX ORDER — NOREPINEPHRINE BIT/0.9 % NACL 8 MG/250ML
.02-.04 INFUSION BOTTLE (ML) INTRAVENOUS
Status: DISCONTINUED | OUTPATIENT
Start: 2019-12-07 | End: 2019-12-09

## 2019-12-07 RX ORDER — NICOTINE POLACRILEX 4 MG
15 LOZENGE BUCCAL
Status: DISCONTINUED | OUTPATIENT
Start: 2019-12-07 | End: 2019-12-10 | Stop reason: HOSPADM

## 2019-12-07 RX ORDER — DEXTROSE MONOHYDRATE 25 G/50ML
25 INJECTION, SOLUTION INTRAVENOUS
Status: DISCONTINUED | OUTPATIENT
Start: 2019-12-07 | End: 2019-12-10 | Stop reason: HOSPADM

## 2019-12-07 RX ADMIN — MUPIROCIN 1 APPLICATION: 20 OINTMENT TOPICAL at 09:23

## 2019-12-07 RX ADMIN — POTASSIUM CHLORIDE 10 MEQ: 7.46 INJECTION, SOLUTION INTRAVENOUS at 05:28

## 2019-12-07 RX ADMIN — CHLORHEXIDINE GLUCONATE 0.12% ORAL RINSE 15 ML: 1.2 LIQUID ORAL at 09:21

## 2019-12-07 RX ADMIN — POLYETHYLENE GLYCOL 3350 17 G: 17 POWDER, FOR SOLUTION ORAL at 20:56

## 2019-12-07 RX ADMIN — CEFAZOLIN SODIUM 2 G: 1 INJECTION, POWDER, FOR SOLUTION INTRAMUSCULAR; INTRAVENOUS at 01:00

## 2019-12-07 RX ADMIN — ASPIRIN 81 MG: 81 TABLET, DELAYED RELEASE ORAL at 09:19

## 2019-12-07 RX ADMIN — MUPIROCIN 1 APPLICATION: 20 OINTMENT TOPICAL at 21:00

## 2019-12-07 RX ADMIN — POTASSIUM CHLORIDE 10 MEQ: 7.46 INJECTION, SOLUTION INTRAVENOUS at 04:05

## 2019-12-07 RX ADMIN — MORPHINE SULFATE 2 MG: 4 INJECTION INTRAVENOUS at 05:07

## 2019-12-07 RX ADMIN — DOCUSATE SODIUM 100 MG: 100 CAPSULE, LIQUID FILLED ORAL at 09:20

## 2019-12-07 RX ADMIN — DEXMEDETOMIDINE HYDROCHLORIDE 0.3 MCG/KG/HR: 100 INJECTION, SOLUTION INTRAVENOUS at 00:04

## 2019-12-07 RX ADMIN — MORPHINE SULFATE 2 MG: 4 INJECTION INTRAVENOUS at 12:46

## 2019-12-07 RX ADMIN — MAGNESIUM SULFATE HEPTAHYDRATE 1 G: 1 INJECTION, SOLUTION INTRAVENOUS at 09:20

## 2019-12-07 RX ADMIN — ACETAMINOPHEN 650 MG: 325 TABLET ORAL at 17:42

## 2019-12-07 RX ADMIN — ALBUMIN HUMAN 250 ML: 0.05 INJECTION, SOLUTION INTRAVENOUS at 00:05

## 2019-12-07 RX ADMIN — SENNOSIDES 1 TABLET: 8.6 TABLET, FILM COATED ORAL at 09:20

## 2019-12-07 RX ADMIN — PANTOPRAZOLE SODIUM 40 MG: 40 TABLET, DELAYED RELEASE ORAL at 09:19

## 2019-12-07 RX ADMIN — SODIUM CHLORIDE 75 ML/HR: 450 INJECTION, SOLUTION INTRAVENOUS at 08:30

## 2019-12-07 RX ADMIN — MAGNESIUM SULFATE HEPTAHYDRATE 1 G: 1 INJECTION, SOLUTION INTRAVENOUS at 01:00

## 2019-12-07 RX ADMIN — DOCUSATE SODIUM 100 MG: 100 CAPSULE, LIQUID FILLED ORAL at 20:56

## 2019-12-07 RX ADMIN — MORPHINE SULFATE 2 MG: 4 INJECTION INTRAVENOUS at 20:57

## 2019-12-07 RX ADMIN — HYDROCODONE BITARTRATE AND ACETAMINOPHEN 1 TABLET: 5; 325 TABLET ORAL at 12:46

## 2019-12-07 RX ADMIN — HYDROCODONE BITARTRATE AND ACETAMINOPHEN 1 TABLET: 5; 325 TABLET ORAL at 03:30

## 2019-12-07 RX ADMIN — MAGNESIUM SULFATE HEPTAHYDRATE 1 G: 1 INJECTION, SOLUTION INTRAVENOUS at 17:42

## 2019-12-07 RX ADMIN — ACETAMINOPHEN 650 MG: 325 TABLET ORAL at 09:20

## 2019-12-07 RX ADMIN — CHLORHEXIDINE GLUCONATE 0.12% ORAL RINSE 15 ML: 1.2 LIQUID ORAL at 20:56

## 2019-12-07 RX ADMIN — MORPHINE SULFATE 2 MG: 4 INJECTION INTRAVENOUS at 00:04

## 2019-12-07 RX ADMIN — SENNOSIDES 1 TABLET: 8.6 TABLET, FILM COATED ORAL at 20:56

## 2019-12-07 RX ADMIN — HYDROCODONE BITARTRATE AND ACETAMINOPHEN 1 TABLET: 5; 325 TABLET ORAL at 18:05

## 2019-12-07 RX ADMIN — WATER 2 G: 1000 INJECTION, SOLUTION INTRAVENOUS at 17:42

## 2019-12-07 RX ADMIN — ACETAMINOPHEN 650 MG: 650 SUPPOSITORY RECTAL at 04:13

## 2019-12-07 NOTE — THERAPY EVALUATION
Acute Care - Occupational Therapy Initial Evaluation  [facility]     Patient Name: Duke Freire  : 1960  MRN: [id number]  Today's Date: 2019             Admit Date: 2019       ICD-10-CM ICD-9-CM   1. Abnormal nuclear stress test R94.39 794.39   2. Angina pectoris (CMS/HCC) I20.9 413.9   3. Coronary artery disease involving native coronary artery of native heart with unstable angina pectoris (CMS/HCC) I25.110 414.01     411.1   4. Essential hypertension I10 401.9     Patient Active Problem List   Diagnosis   • Abnormal nuclear stress test   • Angina pectoris (CMS/HCC)   • Coronary artery disease involving native coronary artery of native heart with unstable angina pectoris (CMS/HCC)   • Essential hypertension   • Coronary artery disease     Past Medical History:   Diagnosis Date   • Coronary artery disease      Past Surgical History:   Procedure Laterality Date   • CARDIAC CATHETERIZATION Left 2019    Procedure: Left Heart Cath;  Surgeon: James Hernandez MD;  Location: [facility] CATH INVASIVE LOCATION;  Service: Cardiovascular   • CARDIAC CATHETERIZATION N/A 2019    Procedure: Coronary angiography;  Surgeon: James Hernandez MD;  Location: [facility] CATH INVASIVE LOCATION;  Service: Cardiovascular   • CORONARY ANGIOPLASTY WITH STENT PLACEMENT     • INNER EAR SURGERY            OT ASSESSMENT FLOWSHEET (last 12 hours)      Occupational Therapy Evaluation     Row Name 19 1522                   OT Evaluation Time/Intention    Document Type  evaluation  -DG        Mode of Treatment  occupational therapy  -DG           General Information    Patient Profile Reviewed?  yes  -DG        Prior Level of Function  independent: primary caregiver of 11 y.o dtr   -DG        Equipment Currently Used at Home  none  -DG        Pertinent History of Current Functional Problem  s/p CABG x2   -DG        Existing Precautions/Restrictions  cardiac  -DG           Bed Mobility  Assessment/Treatment    Bed Mobility Assessment/Treatment  bed mobility (all) activities;supine-sit  -DG        Supine-Sit Norris (Bed Mobility)  maximum assist (25% patient effort);1 person assist  -DG        Bed Mobility, Safety Issues  decreased use of arms for pushing/pulling  -DG        Assistive Device (Bed Mobility)  draw sheet;head of bed elevated  -DG           Transfer Assessment/Treatment    Transfer Assessment/Treatment  bed-chair transfer;sit-stand transfer  -DG           Bed-Chair Transfer    Bed-Chair Norris (Transfers)  minimum assist (75% patient effort);1 person assist  -DG           Sit-Stand Transfer    Sit-Stand Norris (Transfers)  minimum assist (75% patient effort);2 person assist  -DG           ADL Assessment/Intervention    BADL Assessment/Intervention  feeding  -DG           Self-Feeding Assessment/Training    Norris Level (Feeding)  liquids to mouth;set up  -DG        Position (Self-Feeding)  edge of bed sitting  -DG           General ROM    GENERAL ROM COMMENTS  unable to assess 2/2 sternal precautions; sx  -DG           MMT (Manual Muscle Testing)    General MMT Comments  unable to assess 2/2 sternal precautions, sx  -DG           Positioning and Restraints    Pre-Treatment Position  in bed  -DG        Post Treatment Position  chair  -DG        In Chair  reclined;notified nsg;call light within reach;encouraged to call for assist;with family/caregiver  -DG           Pain Assessment    Additional Documentation  Pain Scale: Numbers Pre/Post-Treatment (Group)  -DG           Pain Scale: Numbers Pre/Post-Treatment    Pain Scale: Numbers, Pretreatment  2/10  -DG        Pain Scale: Numbers, Post-Treatment  4/10  -DG        Pain Location  chest  -DG           Wound 12/06/19 sternal Incision    Wound - Properties Group Date first assessed: 12/06/19  -MW Present on Hospital Admission: N  -MW Location: sternal  -MW Primary Wound Type: Incision  -MW       Wound 12/06/19 Left  leg Incision    Wound - Properties Group Date first assessed: 12/06/19  -MW Side: Left  -MW Location: leg  -MW Primary Wound Type: Incision  -MW       Wound 12/06/19 1534 Other (See comments) torso Incision    Wound - Properties Group Date first assessed: 12/06/19  -MW Time first assessed: 1534  -MW Side: Other (See comments)  -MW Location: torso  -MW Primary Wound Type: Incision  -MW       Clinical Impression (OT)    Criteria for Skilled Therapeutic Interventions Met (OT Eval)  yes  -DG        Therapy Frequency (OT Eval)  3 times/wk  -DG        Care Plan Review, Other Participant (OT Eval)  -- family friend  -DG        Anticipated Discharge Disposition (OT)  home with assist  -DG           OT Goals    Bed Mobility Goal Selection (OT)  bed mobility, OT goal 1  -DG        Transfer Goal Selection (OT)  transfer, OT goal 1  -DG        Dressing Goal Selection (OT)  dressing, OT goal 1  -DG           Bed Mobility Goal 1 (OT)    Activity/Assistive Device (Bed Mobility Goal 1, OT)  bed mobility activities, all  -DG        Alfalfa Level/Cues Needed (Bed Mobility Goal 1, OT)  supervision required  -DG        Time Frame (Bed Mobility Goal 1, OT)  2 weeks  -DG           Transfer Goal 1 (OT)    Activity/Assistive Device (Transfer Goal 1, OT)  transfers, all  -DG        Al[place]alfa Level/Cues Needed (Transfer Goal 1, OT)  supervision required  -DG        Time Frame (Transfer Goal 1, OT)  2 weeks  -DG           Dressing Goal 1 (OT)    Activity/Assistive Device (Dressing Goal 1, OT)  dressing skills, all  -DG        Alfalfa/Cues Needed (Dressing Goal 1, OT)  supervision required  -DG        Time Frame (Dressing Goal 1, OT)  2 weeks  -DG          User Key  (r) = Recorded By, (t) = Taken By, (c) = Cosigned By    Initials Name Effective Dates    Jason Rice RN 12/05/16 -     DG Angelina Shirley, OT 07/25/19 -                OT Recommendation and Plan  Outcome Summary/Treatment Plan (OT)  Anticipated Discharge  Disposition (OT): home with assist  Therapy Frequency (OT Eval): 3 times/wk  Plan of Care Review  Plan of Care Reviewed With: patient, other (see comments)(family friend)  Plan of Care Reviewed With: patient, other (see comments)(family friend)  Outcome Summary: 59 yo male POD 1 s/p CABG x2. Pt previously independent, and resides with 11 y.o dtr who he is primary caregiver of. Pt with fair tolerance to activity of therapy eval, limited by reports of dizziness, nausea. Was able to progress standing tolerance and transfer to chair with x2 assist for line management. Recommend to fam friend that pt arrange initial 24 hr suprvision/ assist of an adult upon d/c.        Time Calculation:   Time Calculation- OT     Row Name 12/07/19 1529             Time Calculation- OT    OT Start Time  0150  -      OT Stop Time  0210  -      OT Time Calculation (min)  20 min  -      OT Received On  12/07/19  -DG      OT - Next Appointment  12/09/19  -GD      OT Goal Re-Cert Due Date  12/21/19  -DG        User Key  (r) = Recorded By, (t) = Taken By, (c) = Cosigned By    Initials Name Provider Type    Angelina Zimmerman OT Occupational Therapist        Therapy Charges for Today     Code Description Service Date Service Provider Modifiers Qty    [id number]  OT EVAL LOW COMPLEXITY 3 12/7/2019 Angelina Shirley OT GO 1    [id number]  OT THERAPEUTIC ACT EA 15 MIN 12/7/2019 Angelina Shirley OT GO 1               Angelina Shirley OT  12/7/2019

## 2019-12-07 NOTE — NURSING NOTE
Notified Dr. Boyle of pt's BP swinging and ABG. He ordered to give 1 amp bicarb, 500 of Albumin over 2 hrs, and 1gm of Calcium Gluconate. Meds given per order. Pt now stabilizing.  No acute distress noted will continue to monitor.

## 2019-12-07 NOTE — PROGRESS NOTES
S/P POD# 1 CABG--Tamar  EF 58% (stress test)    Subjective:  Patient resting in bed. Requesting ice chips    Drips:  Levo 0.02, Insulin  CI 3.44, CVP 18  CXR-- atel    Intake/Output Summary (Last 24 hours) at 12/7/2019 1030  Last data filed at 12/7/2019 0600  Gross per 24 hour   Intake 5173 ml   Output 3645 ml   Net 1528 ml     Temp:  [98.7 °F (37.1 °C)-98.8 °F (37.1 °C)] 98.7 °F (37.1 °C)  Heart Rate:  [54-90] 90  Resp:  [16] 16  BP: ()/(43-79) 112/51  Arterial Line BP: ()/(39-70) 100/50  FiO2 (%):  [40 %-70 %] 40 %  /8    Results from last 7 days   Lab Units 12/07/19  0255 12/07/19  0229  12/06/19  2045 12/06/19  1929  12/06/19  1737   WBC 10*3/mm3 7.30  --   --  6.30 7.80  --  6.30   HEMOGLOBIN g/dL 7.1*  --   --  8.8* 9.8*  --  8.4*   HEMOGLOBIN, POC g/dL  --  5.9*   < >  --   --    < >  --    HEMATOCRIT % 19.3*  --   --  23.8* 26.4*  --  23.2*   HEMATOCRIT POC %  --  17*   < >  --   --    < >  --    PLATELETS 10*3/mm3 190  --   --  172 123*  --  117*  117*   INR   --   --   --  1.30* 1.22*  --  1.33*    < > = values in this interval not displayed.     Results from last 7 days   Lab Units 12/07/19  0255   CREATININE mg/dL 1.37*   POTASSIUM mmol/L 3.6   SODIUM mmol/L 147*   MAGNESIUM mg/dL 2.8*   PHOSPHORUS mg/dL 1.2*     ica 1.28    Physical Exam:  Neuro intact, nad,   Tele:  Paced 90, SR 60's  Diminished bases, 100% 4L NC  dsg c/d/i, No drainage  Benign abd, No  BM  No edema    Assessment/Plan:  Active Problems:    Abnormal nuclear stress test    Angina pectoris (CMS/HCC)    Coronary artery disease involving native coronary artery of native heart with unstable angina pectoris (CMS/HCC)    Essential hypertension    Coronary artery disease    MV CAD, s/p stenting, EF 58% (stress test)--surgical workup ongoing  Expected postop SAL-- 1 PRBC today  HTN--stable  Dyslipidemia--statin    Routine care  Did NOT de-escalate d/t Levo  Mobilize  1 PRBC per Dr. Tamar lares  Wean Levo to off    GULSHAN  KIRSTEN MELTON  12/7/2019  10:30 AM

## 2019-12-07 NOTE — PLAN OF CARE
Problem: Patient Care Overview  Goal: Plan of Care Review  Outcome: Ongoing (interventions implemented as appropriate)  Flowsheets (Taken 12/7/2019 3568)  Progress: improving  Plan of Care Reviewed With: patient; sibling  Outcome Summary: Pt stable. Hgb 7.1 this am. Will transfuse 1u PRBC per MD order. No acute distress noted at this time.

## 2019-12-07 NOTE — PROGRESS NOTES
"NEPHROLOGY PROGRESS NOTE------[facility]    [facility]  [phone]  Luis Goff MD      Patient Care Team:  Sandra Cha PA as PCP - General  James Hernandez MD as Consulting Physician (Cardiology)  Marilyn Goff MD as Consulting Physician (Nephrology)      Provider:  Luis Goff MD  Patient Name: Duke Freire  :  1960    SUBJECTIVE:    S/P CABG. No SOB, CP, dysuria    Medication:    acetaminophen 650 mg Rectal Q6H   Or      acetaminophen 650 mg Oral Q6H   aspirin 81 mg Oral Daily   atorvastatin 40 mg Oral Nightly   ceFAZolin 2 g Intravenous Q8H   chlorhexidine 15 mL Mouth/Throat Q12H   docusate sodium 100 mg Oral BID   [START ON 2019] enoxaparin 40 mg Subcutaneous Daily   magnesium sulfate 1 g Intravenous Q8H   mupirocin 1 application Each Nare BID   pantoprazole 40 mg Oral Q AM   polyethylene glycol 17 g Oral BID   senna 1 tablet Oral BID       dexmedetomidine 0.2-0.7 mcg/kg/hr Last Rate: 0.3 mcg/kg/hr (19 0004)   insulin 0-50 Units/hr    niCARdipine 5-15 mg/hr    norepinephrine 0.02-0.04 mcg/kg/min Last Rate: 0.03 mcg/kg/min (19 0511)   sodium chloride 30 mL/hr Last Rate: 30 mL/hr (19)       OBJECTIVE    Vital Sign Min/Max for last 24 hours  Temp  Min: 98.1 °F (36.7 °C)  Max: 98.1 °F (36.7 °C)   BP  Min: 71/43  Max: 124/73   Pulse  Min: 54  Max: 90   No data recorded   SpO2  Min: 82 %  Max: 100 %   No data recorded   No data recorded     Flowsheet Rows      First Filed Value   Admission Height  177.8 cm (70\") Documented at 2019 1632   Admission Weight  87.9 kg (193 lb 12.6 oz) Documented at 2019 1632          No intake/output data recorded.  I/O last 3 completed shifts:  In: 5433 [I.V.:2609; Blood:1074; IV Piggyback:1750]  Out: 3845 [Urine:1945; Other:600; Blood:900; Chest Tube:400]    Physical Exam:  General Appearance: alert, appears stated age and cooperative  Head: " normocephalic, without obvious abnormality and atraumatic  Eyes: conjunctivae and sclerae normal and no icterus  Neck: supple and no JVD  Lungs: DECREASED BS BIBASILAR  Heart: regular rhythm & normal rate and normal S1, S2 +IRIS  Chest: Wall no abnormalities observed  Abdomen: +HYPOACTIVE BS  Extremities: moves extremities well, no edema, no cyanosis and no redness  Skin: no bleeding, bruising or rash, turgor normal, color normal and no lesions noted  Neurologic: Alert, and oriented. No focal deficits    Labs:    WBC WBC   Date Value Ref Range Status   12/07/2019 7.30 3.40 - 10.80 10*3/mm3 Final   12/06/2019 6.30 3.40 - 10.80 10*3/mm3 Final   12/06/2019 7.80 3.40 - 10.80 10*3/mm3 Final   12/06/2019 6.30 3.40 - 10.80 10*3/mm3 Final   12/06/2019 6.30 3.40 - 10.80 10*3/mm3 Final   12/05/2019 6.50 3.40 - 10.80 10*3/mm3 Final   12/04/2019 6.00 3.40 - 10.80 10*3/mm3 Final      HGB Hemoglobin   Date Value Ref Range Status   12/07/2019 7.1 (L) 13.0 - 17.7 g/dL Final   12/07/2019 5.9 (L) 12.0 - 17.0 g/dL Final   12/06/2019 7.6 (L) 12.0 - 17.0 g/dL Final   12/06/2019 8.5 (L) 12.0 - 17.0 g/dL Final   12/06/2019 8.1 (L) 12.0 - 17.0 g/dL Final   12/06/2019 7.9 (L) 12.0 - 17.0 g/dL Final   12/06/2019 8.8 (L) 13.0 - 17.7 g/dL Final   12/06/2019 9.8 (L) 13.0 - 17.7 g/dL Final   12/06/2019 7.8 (C) 12.0 - 17.0 g/dL Final   12/06/2019 8.4 (L) 13.0 - 17.7 g/dL Final     Comment:     Result checked    12/06/2019 7.5 (C) 12.0 - 17.0 g/dL Final   12/06/2019 8.2 (L) 12.0 - 17.0 g/dL Final   12/06/2019 8.2 (L) 12.0 - 17.0 g/dL Final   12/06/2019 8.8 (L) 12.0 - 17.0 g/dL Final   12/06/2019 8.8 (L) 12.0 - 17.0 g/dL Final   12/06/2019 10.2 (L) 12.0 - 17.0 g/dL Final   12/06/2019 13.7 13.0 - 17.7 g/dL Final   12/05/2019 14.5 13.0 - 17.7 g/dL Final   12/04/2019 13.9 13.0 - 17.7 g/dL Final      HCT Hematocrit   Date Value Ref Range Status   12/07/2019 19.3 (C) 37.5 - 51.0 % Final   12/07/2019 17 (L) 38 - 51 % Final   12/06/2019 22 (L) 38 - 51 %  Final   12/06/2019 25 (L) 38 - 51 % Final   12/06/2019 24 (L) 38 - 51 % Final   12/06/2019 23 (L) 38 - 51 % Final   12/06/2019 23.8 (L) 37.5 - 51.0 % Final   12/06/2019 26.4 (L) 37.5 - 51.0 % Final   12/06/2019 23.0 (L) 38 - 51 % Final   12/06/2019 23.2 (L) 37.5 - 51.0 % Final     Comment:     Result checked    12/06/2019 22.0 (L) 38 - 51 % Final   12/06/2019 24.0 (L) 38 - 51 % Final   12/06/2019 24.0 (L) 38 - 51 % Final   12/06/2019 26.0 (L) 38 - 51 % Final   12/06/2019 26.0 (L) 38 - 51 % Final   12/06/2019 30.0 (L) 38 - 51 % Final   12/06/2019 38.1 37.5 - 51.0 % Final   12/05/2019 40.2 37.5 - 51.0 % Final   12/04/2019 39.5 37.5 - 51.0 % Final      Platlets No results found for: LABPLAT   MCV MCV   Date Value Ref Range Status   12/07/2019 91.2 79.0 - 97.0 fL Final   12/06/2019 91.6 79.0 - 97.0 fL Final   12/06/2019 91.1 79.0 - 97.0 fL Final   12/06/2019 93.4 79.0 - 97.0 fL Final   12/06/2019 93.5 79.0 - 97.0 fL Final   12/05/2019 92.7 79.0 - 97.0 fL Final   12/04/2019 93.0 79.0 - 97.0 fL Final          Sodium Sodium   Date Value Ref Range Status   12/07/2019 147 (H) 136 - 145 mmol/L Final   12/06/2019 140 136 - 145 mmol/L Final   12/06/2019 138 136 - 145 mmol/L Final   12/05/2019 140 136 - 145 mmol/L Final   12/04/2019 142 136 - 145 mmol/L Final      Potassium Potassium   Date Value Ref Range Status   12/07/2019 3.6 3.5 - 5.2 mmol/L Final   12/06/2019 3.9 3.5 - 5.2 mmol/L Final   12/06/2019 4.2 3.5 - 5.2 mmol/L Final   12/05/2019 4.6 3.5 - 5.2 mmol/L Final   12/04/2019 4.6 3.5 - 5.2 mmol/L Final      Chloride Chloride   Date Value Ref Range Status   12/07/2019 108 (H) 98 - 107 mmol/L Final   12/06/2019 107 98 - 107 mmol/L Final   12/06/2019 101 98 - 107 mmol/L Final   12/05/2019 106 98 - 107 mmol/L Final   12/04/2019 105 98 - 107 mmol/L Final      CO2 CO2   Date Value Ref Range Status   12/07/2019 27.0 22.0 - 29.0 mmol/L Final   12/06/2019 23.0 22.0 - 29.0 mmol/L Final   12/06/2019 28.0 22.0 - 29.0 mmol/L Final    12/05/2019 25.0 22.0 - 29.0 mmol/L Final   12/04/2019 29.0 22.0 - 29.0 mmol/L Final      BUN BUN   Date Value Ref Range Status   12/07/2019 18 6 - 20 mg/dL Final   12/06/2019 15 6 - 20 mg/dL Final   12/06/2019 15 6 - 20 mg/dL Final   12/05/2019 22 (H) 6 - 20 mg/dL Final   12/04/2019 25 (H) 6 - 20 mg/dL Final      Creatinine Creatinine   Date Value Ref Range Status   12/07/2019 1.37 (H) 0.76 - 1.27 mg/dL Final   12/06/2019 1.14 0.76 - 1.27 mg/dL Final   12/06/2019 1.13 0.76 - 1.27 mg/dL Final   12/05/2019 1.10 0.76 - 1.27 mg/dL Final   12/04/2019 1.43 (H) 0.76 - 1.27 mg/dL Final      Calcium Calcium   Date Value Ref Range Status   12/07/2019 9.4 8.6 - 10.5 mg/dL Final   12/06/2019 9.1 8.6 - 10.5 mg/dL Final   12/06/2019 8.9 8.6 - 10.5 mg/dL Final   12/05/2019 8.9 8.6 - 10.5 mg/dL Final   12/04/2019 9.4 8.6 - 10.5 mg/dL Final      PO4 No components found for: PO4   Albumin Albumin   Date Value Ref Range Status   12/07/2019 4.60 3.50 - 5.20 g/dL Final   12/06/2019 4.10 3.50 - 5.20 g/dL Final   12/06/2019 4.00 3.50 - 5.20 g/dL Final   12/05/2019 3.90 3.50 - 5.20 g/dL Final      Magnesium Magnesium   Date Value Ref Range Status   12/07/2019 2.8 (H) 1.6 - 2.6 mg/dL Final   12/06/2019 2.1 1.6 - 2.6 mg/dL Final      Uric Acid No components found for: URIC ACID     Imaging Results (Last 72 Hours)     Procedure Component Value Units Date/Time    XR Chest 1 View [[id number]] Collected:  12/07/19 0736     Updated:  12/07/19 0740    Narrative:       DATE OF EXAM:  12/7/2019 5:52 AM     PROCEDURE:  XR CHEST 1 VW-     INDICATIONS:  Post-Op Heart Surgery; R94.39-Abnormal result of other cardiovascular  function study; I20.9-Angina pectoris, unspecified;  I25.110-Atherosclerotic heart disease of native coronary artery with  unstable angina pectoris; I10-Essential (primary) hypertension patient's  a 58-year-old male who presents with history of being postop cardiac  surgery, shortness of breath, high blood pressure. Today's postop  day 1     COMPARISON:  Study of 12/06/2019     TECHNIQUE:   Single radiographic AP view of the chest was obtained.     FINDINGS:  Today's exam was obtained a portable erect position on 12/07/2019 at  5:54 AM     The ET tube and NG tube have been withdrawn. A right internal jugular  [place]-Prabhakar catheter is present with tip in the main pulmonary artery in  good position. The left-sided chest tube and left-sided mediastinal  drain are again seen bilateral basilar areas of atelectasis are present  which appears stable from yesterday's study. There is no evidence of  pneumothorax. The upper abdomen appears unremarkable. Changes of CABG  are noted.        Impression:          1. Interval removal of ET tube and NG tube  2. Bilateral basilar areas of atelectasis which appears stable  3. [place]-Prabhakar catheter, mediastinal drain and left chest tube remain in  good position     Electronically Signed By-Delvin Cordova Jr. On:12/7/2019 7:38 AM  This report was finalized on [id number] by  Delvin Cordova Jr., .    XR Chest 1 View [[id number]] Collected:  12/06/19 2159     Updated:  12/06/19 2203    Narrative:       XR CHEST 1 VW-     Date of Exam: 12/6/2019 9:10 PM     Indication: Post-Op Check Line & Tube Placement; R94.39-Abnormal  result of other cardiovascular function study; I20.9-Angina pectoris,  unspecified; I25.110-Atherosclerotic heart disease of native coronary  artery with unstable angina pectoris; I10-Essential (primary)  hypertension  58-year-old, postop day 0 from CABG     Comparison: 12/05/2019     Technique: 1 view(s) of the chest were obtained. 2 images are  submitted.     FINDINGS: There is been interval CABG. Tip of a right IJ pulmonary  artery catheter in the region of the right main pulmonary artery. NG  tube side-port in the distal esophagus with the tip at the GE junction,  this should be advanced. Mediastinal drain and left chest tube. The  position. Tip of an ET tube is in the midline 5 cm above the chino.  No  pneumothorax or pleural effusion. There is bibasilar plate atelectasis.  Pulmonary vascularity is normal.       Impression:       Interval CABG with plate atelectasis in each lung base.  The side-port of the NG tube is in the distal esophagus with the tip  near the GE junction. This should be advanced approximately 10 cm. Other  support lines and tubes are in good position.        Electronically Signed By-Rosalio Canales DO. On:12/6/2019 10:01 PM  This report was finalized on [id number] by  Rosalio Canales DO..    US Renal Bilateral [[id number]] Collected:  12/05/19 2331     Updated:  12/06/19 0133    Narrative:       EXAMINATION: Complete retroperitoneal ultrasound examination.    INDICATIONS: Chronic kidney disease    TECHNIQUE: Ultrasound of the kidneys and urinary bladder was performed.    COMPARISON: None    FINDINGS:    The right kidney demonstrates cortical thinning without hydronephrosis, measuring 10.7 cm. No sonographically detectable mass is identified.    The left kidney also exhibits cortical thinning without hydronephrosis, measuring 11.3 cm. No sonographically detectable mass is identified.    The urinary bladder is collapsed and not imaged.      Impression:         1. No evidence of urinary obstruction.  2. Cortical thinning of the kidneys, compatible with chronic kidney disease.    Electronically signed by:  Jacob Wilson M.D.    12/5/2019 11:32 PM    XR Chest 1 View [[id number]] Collected:  12/05/19 1609     Updated:  12/05/19 1611    Narrative:       DATE OF EXAM:  12/5/2019 3:56 PM     PROCEDURE:  XR CHEST 1 VW-     INDICATIONS:  Cardiac Surgery       COMPARISON:  None     TECHNIQUE:   Single radiographic view of the chest was obtained.     FINDINGS:  No acute airspace disease. Heart size is normal. No pleural effusion or  pneumothorax or acute osseous abnormality.       Impression:       No acute chest findings.     Electronically Signed By-Dr. Sayra Anand MD On:12/5/2019 4:09 PM  This  report was finalized on [id number] by Dr. Sayra Anand MD.          Results for orders placed during the hospital encounter of 12/04/19   XR Chest 1 View    Narrative DATE OF EXAM:  12/7/2019 5:52 AM     PROCEDURE:  XR CHEST 1 VW-     INDICATIONS:  Post-Op Heart Surgery; R94.39-Abnormal result of other cardiovascular  function study; I20.9-Angina pectoris, unspecified;  I25.110-Atherosclerotic heart disease of native coronary artery with  unstable angina pectoris; I10-Essential (primary) hypertension patient's  a 58-year-old male who presents with history of being postop cardiac  surgery, shortness of breath, high blood pressure. Today's postop day 1     COMPARISON:  Study of 12/06/2019     TECHNIQUE:   Single radiographic AP view of the chest was obtained.     FINDINGS:  Today's exam was obtained a portable erect position on 12/07/2019 at  5:54 AM     The ET tube and NG tube have been withdrawn. A right internal jugular  [place]-Prabhakar catheter is present with tip in the main pulmonary artery in  good position. The left-sided chest tube and left-sided mediastinal  drain are again seen bilateral basilar areas of atelectasis are present  which appears stable from yesterday's study. There is no evidence of  pneumothorax. The upper abdomen appears unremarkable. Changes of CABG  are noted.        Impression    1. Interval removal of ET tube and NG tube  2. Bilateral basilar areas of atelectasis which appears stable  3. [place]-Prabhakar catheter, mediastinal drain and left chest tube remain in  good position     Electronically Signed By-Delvin Cordova Jr. On:12/7/2019 7:38 AM  This report was finalized on [id number] by  Delvin Cordova Jr., .   XR Chest 1 View    Narrative XR CHEST 1 VW-     Date of Exam: 12/6/2019 9:10 PM     Indication: Post-Op Check Line & Tube Placement; R94.39-Abnormal  result of other cardiovascular function study; I20.9-Angina pectoris,  unspecified; I25.110-Atherosclerotic heart disease of native  coronary  artery with unstable angina pectoris; I10-Essential (primary)  hypertension  58-year-old, postop day 0 from CABG     Comparison: 12/05/2019     Technique: 1 view(s) of the chest were obtained. 2 images are  submitted.     FINDINGS: There is been interval CABG. Tip of a right IJ pulmonary  artery catheter in the region of the right main pulmonary artery. NG  tube side-port in the distal esophagus with the tip at the GE junction,  this should be advanced. Mediastinal drain and left chest tube. The  position. Tip of an ET tube is in the midline 5 cm above the chino. No  pneumothorax or pleural effusion. There is bibasilar plate atelectasis.  Pulmonary vascularity is normal.       Impression Interval CABG with plate atelectasis in each lung base.  The side-port of the NG tube is in the distal esophagus with the tip  near the GE junction. This should be advanced approximately 10 cm. Other  support lines and tubes are in good position.        Electronically Signed By-Rosalio Canales DO. On:12/6/2019 10:01 PM  This report was finalized on [id number] by  Rosalio Canales DO..   XR Chest 1 View    Narrative DATE OF EXAM:  12/5/2019 3:56 PM     PROCEDURE:  XR CHEST 1 VW-     INDICATIONS:  Cardiac Surgery       COMPARISON:  None     TECHNIQUE:   Single radiographic view of the chest was obtained.     FINDINGS:  No acute airspace disease. Heart size is normal. No pleural effusion or  pneumothorax or acute osseous abnormality.       Impression No acute chest findings.     Electronically Signed By-Dr. Sayra Anand MD On:12/5/2019 4:09 PM  This report was finalized on [id number] by Dr. Sayra Anand MD.       Results for orders placed during the hospital encounter of 12/04/19   Duplex Vein Mapping Lower Extremity - Bilateral CAR    Narrative · The right greater saphenous vein is patent and of adequate size in the   thigh.  · The left greater saphenous vein is patent and of adequate size in the   thigh.  · The  left greater saphenous vein is patent and of adequate size in the   calf.  · The right greater saphenous vein is patent and of adequate size in the   calf.            ASSESSMENT / PLAN      Abnormal nuclear stress test    Angina pectoris (CMS/HCC)    Coronary artery disease involving native coronary artery of native heart with unstable angina pectoris (CMS/HCC)    Essential hypertension    Coronary artery disease    1. ARF/ZAIN/CRF/CKD STG 2------Nonoliguric. BUN/Cr up a little. PRBCs to help renal perfusion. +ARF/ZAIN on top of what appears to be CRF/CKD STG 2, with a baseline serum creatinine of 1.1. Unknown if patient has baseline proteinuria or hematuria. CRF/CKD STG 2 is likely secondary to HTN NS. +ARF/ZAIN is already better and likely was secondary to slight prerenal state with concomitant ACE-I use. RENAL US showed cortical thinning compatible with chronic medical kidney disease.  Keep off of ACE-I for now. Hydrate in preparation for CABG and given recent IV dye exposure.  No NSAIDs or further IV dye.       2. HTN WITH CKD-------Avoid hypotension. Very gradual BP reduction. No ACE/ARB/DRI/diuretic for now     3. CAD S/P CATH S/P CABG-----per Cardiology and CT surgery     4. HYPERLIPIDEMIA------On Statin. TSH 19.08. CK ok. Synthroid     5. OA/DJD------No NSAIDs.       6. HYPOTHYROIDISM---Synthroid    7. ANEMIA-------PRBCs today    8. HYPERNATREMIA-------Hypotonic IVFs             Luis Goff MD  [facility]  [phone]  12/07/19  7:40 AM

## 2019-12-07 NOTE — PROGRESS NOTES
Reason for follow-up: Severe LAD disease  Unstable angina     Patient Care Team:  Sandra Cha PA as PCP - General  James Hernandez MD as Consulting Physician (Cardiology)  Marilyn Goff MD as Consulting Physician (Nephrology)    Subjective .   Is complaining of some chest wall pain     Review of Systems   Constitution: Positive for malaise/fatigue. Negative for fever.   HENT: Negative for congestion and hearing loss.    Eyes: Negative for double vision and visual disturbance.   Cardiovascular: Positive for chest pain. Negative for claudication, dyspnea on exertion, leg swelling and syncope.   Respiratory: Negative for cough and shortness of breath.    Endocrine: Negative for cold intolerance.   Skin: Negative for color change and rash.   Musculoskeletal: Negative for arthritis and joint pain.   Gastrointestinal: Negative for abdominal pain and heartburn.   Genitourinary: Negative for hematuria.   Neurological: Negative for excessive daytime sleepiness and dizziness.   Psychiatric/Behavioral: Negative for depression. The patient is not nervous/anxious.    All other systems reviewed and are negative.      Patient has no known allergies.    Scheduled Meds:    acetaminophen 650 mg Rectal Q6H   Or      acetaminophen 650 mg Oral Q6H   aspirin 81 mg Oral Daily   ceFAZolin 2 g Intravenous Q12H   chlorhexidine 15 mL Mouth/Throat Q12H   docusate sodium 100 mg Oral BID   [START ON 12/8/2019] enoxaparin 30 mg Subcutaneous Daily   [START ON 12/9/2019] insulin lispro 0-9 Units Subcutaneous 4x Daily With Meals & Nightly   magnesium sulfate 1 g Intravenous Q8H   mupirocin 1 application Each Nare BID   pantoprazole 40 mg Oral Q AM   polyethylene glycol 17 g Oral BID   senna 1 tablet Oral BID     Continuous Infusions:    dexmedetomidine 0.2-0.7 mcg/kg/hr Last Rate: 0.3 mcg/kg/hr (12/07/19 0004)   insulin 0-50 Units/hr    niCARdipine 5-15 mg/hr    norepinephrine 0.02-0.04 mcg/kg/min Last Rate:  "Stopped (12/07/19 0840)   sodium chloride 75 mL/hr Last Rate: 75 mL/hr (12/07/19 0830)     PRN Meds:.albumin human  •  dextrose  •  dextrose  •  glucagon (human recombinant)  •  HYDROcodone-acetaminophen  •  [START ON 12/9/2019] insulin lispro **AND** [START ON 12/9/2019] insulin lispro  •  insulin  •  Morphine **AND** naloxone  •  MOUTH KOTE  •  ondansetron  •  potassium chloride **OR** potassium chloride  •  potassium chloride **OR** potassium chloride    Objective   Looks comfortable lying in the bed    VITAL SIGNS  Vitals:    12/07/19 0811 12/07/19 0826 12/07/19 1100 12/07/19 1151   BP: 104/49 112/51 118/56    Pulse: 90 90 90    Resp: 16 16 16    Temp: 98.7 °F (37.1 °C) 98.7 °F (37.1 °C) 98.8 °F (37.1 °C) 98.7 °F (37.1 °C)   TempSrc:    Core   SpO2: 100% 100% 100%    Weight:       Height:           Flowsheet Rows      First Filed Value   Admission Height  177.8 cm (70\") Documented at 12/04/2019 1632   Admission Weight  87.9 kg (193 lb 12.6 oz) Documented at 12/04/2019 1632           TELEMETRY: Sinus rhythm    Physical Exam:  Physical Exam   Constitutional: He is oriented to person, place, and time. He appears well-developed and well-nourished. He is cooperative.   HENT:   Head: Normocephalic and atraumatic.   Mouth/Throat: Uvula is midline and oropharynx is clear and moist. No oral lesions.   Eyes: Conjunctivae are normal. No scleral icterus.   Neck: Trachea normal. Neck supple. No JVD present. Carotid bruit is not present. No thyromegaly present.   Cardiovascular: Normal rate, regular rhythm, S1 normal, S2 normal, normal heart sounds, intact distal pulses and normal pulses. PMI is not displaced. Exam reveals no gallop and no friction rub.   No murmur heard.  Pulmonary/Chest: Effort normal and breath sounds normal.   Abdominal: Soft. Bowel sounds are normal.   Musculoskeletal: Normal range of motion.   Neurological: He is alert and oriented to person, place, and time. He has normal strength.   No focal deficits "   Skin: Skin is warm. No cyanosis.   Right groin no hematoma   Psychiatric: He has a normal mood and affect.                LAB RESULTS (LAST 7 DAYS)    CBC  Results from last 7 days   Lab Units 12/07/19  0255 12/07/19  0229 12/06/19  2322 12/06/19  2202 12/06/19  2135 12/06/19  2100 12/06/19  2045 12/06/19  1929  12/06/19  1737  12/06/19  0550 12/05/19  1507 12/04/19  1533   WBC 10*3/mm3 7.30  --   --   --   --   --  6.30 7.80  --  6.30  --  6.30 6.50 6.00   RBC 10*6/mm3 2.12*  --   --   --   --   --  2.60* 2.89*  --  2.48*  --  4.07* 4.33 4.24   HEMOGLOBIN g/dL 7.1*  --   --   --   --   --  8.8* 9.8*  --  8.4*  --  13.7 14.5 13.9   HEMOGLOBIN, POC g/dL  --  5.9* 7.6* 8.5* 8.1* 7.9*  --   --    < >  --    < >  --   --   --    HEMATOCRIT % 19.3*  --   --   --   --   --  23.8* 26.4*  --  23.2*  --  38.1 40.2 39.5   HEMATOCRIT POC %  --  17* 22* 25* 24* 23*  --   --    < >  --    < >  --   --   --    MCV fL 91.2  --   --   --   --   --  91.6 91.1  --  93.4  --  93.5 92.7 93.0   PLATELETS 10*3/mm3 190  --   --   --   --   --  172 123*  --  117*  117*  --  188 203 216    < > = values in this interval not displayed.       BMP  Results from last 7 days   Lab Units 12/07/19  1157 12/07/19  0255 12/06/19  2045 12/06/19  0550 12/05/19  0557 12/04/19  1533   SODIUM mmol/L  --  147* 140 138 140 142   POTASSIUM mmol/L 4.3 3.6 3.9 4.2 4.6 4.6   CHLORIDE mmol/L  --  108* 107 101 106 105   CO2 mmol/L  --  27.0 23.0 28.0 25.0 29.0   BUN mg/dL  --  18 15 15 22* 25*   CREATININE mg/dL  --  1.37* 1.14 1.13 1.10 1.43*   GLUCOSE mg/dL  --  137* 134* 93 94 96   MAGNESIUM mg/dL  --  2.8*  --  2.1  --   --    PHOSPHORUS mg/dL  --  1.2* 3.3 3.2  --   --        CMP   Results from last 7 days   Lab Units 12/07/19  1157 12/07/19  0255 12/06/19  2045 12/06/19  0550 12/05/19  0557 12/04/19  1533   SODIUM mmol/L  --  147* 140 138 140 142   POTASSIUM mmol/L 4.3 3.6 3.9 4.2 4.6 4.6   CHLORIDE mmol/L  --  108* 107 101 106 105   CO2 mmol/L  --  27.0  23.0 28.0 25.0 29.0   BUN mg/dL  --  18 15 15 22* 25*   CREATININE mg/dL  --  1.37* 1.14 1.13 1.10 1.43*   GLUCOSE mg/dL  --  137* 134* 93 94 96   ALBUMIN g/dL  --  4.60 4.10 4.00 3.90  --    BILIRUBIN mg/dL  --   --  0.7 0.4 0.5  --    ALK PHOS U/L  --   --  48 92 91  --    AST (SGOT) U/L  --   --  24 20 17  --    ALT (SGPT) U/L  --   --  15 25 21  --          BNP        TROPONIN  Results from last 7 days   Lab Units 12/05/19  1507   CK TOTAL U/L 63       CoAg  Results from last 7 days   Lab Units 12/06/19  2045 12/06/19  1929 12/06/19  1737 12/05/19  1507 12/04/19  1533   INR  1.30* 1.22* 1.33* 1.06 1.10   APTT seconds 27.3 29.0 27.8 30.1  --        Creatinine Clearance  Estimated Creatinine Clearance: 71.7 mL/min (A) (by C-G formula based on SCr of 1.37 mg/dL (H)).    ABG  Results from last 7 days   Lab Units 12/07/19  0229 12/06/19  2322 12/06/19  2202 12/06/19  2135 12/06/19  2100 12/06/19  1905 12/06/19  1715   PH, ARTERIAL pH units 7.385 7.390 7.254* 7.275* 7.392 7.409 7.430   PCO2, ARTERIAL mm Hg 48.1* 46.5 57.7* 56.1* 42.1 40.0 41.0   PO2 ART mm Hg 98.3 133.9* 93.6 112.6* 322.6* 263.0* 336.0*   O2 SATURATION ART % 97.4 99.0* 95.7 97.6 99.9* 100.0* 100.0*   BASE EXCESS ART mmol/L 3.4* 2.8 -1.9* -1.0* 0.6 1.0 3.0       Radiology  Xr Chest 1 View    Result Date: 12/7/2019   1. Interval removal of ET tube and NG tube 2. Bilateral basilar areas of atelectasis which appears stable 3. [place]-Prabhakar catheter, mediastinal drain and left chest tube remain in good position  Electronically Signed By-Delvin Cordova Jr. On:12/7/2019 7:38 AM This report was finalized on [id number] by  Delvin Cordova Jr., .    Xr Chest 1 View    Result Date: 12/6/2019  Interval CABG with plate atelectasis in each lung base. The side-port of the NG tube is in the distal esophagus with the tip near the GE junction. This should be advanced approximately 10 cm. Other support lines and tubes are in good position.   Electronically Signed ByBruce  DO. Parker On:12/6/2019 10:01 PM This report was finalized on [id number] by  Rosalio Canales DO..    Xr Chest 1 View    Result Date: 12/5/2019  No acute chest findings.  Electronically Signed By-Dr. Sayra Anand MD On:12/5/2019 4:09 PM This report was finalized on [id number] by Dr. Sayra Anand MD.    Us Renal Bilateral    Result Date: 12/5/2019  1. No evidence of urinary obstruction. 2. Cortical thinning of the kidneys, compatible with chronic kidney disease. Electronically signed by:  Jacob Wilson M.D.  12/5/2019 11:32 PM            EKG    I personally viewed and interpreted the patient's EKG/Telemetry data:    ECHOCARDIOGRAM:       STRESS MYOVIEW:    CARDIAC CATHETERIZATION:    OTHER:         Assessment/Plan       Abnormal nuclear stress test    Angina pectoris (CMS/HCC)    Coronary artery disease involving native coronary artery of native heart with unstable angina pectoris (CMS/HCC)    Essential hypertension    Coronary artery disease      Patient underwent cardiac cath which showed severe LAD and diagonal artery disease  Films reviewed with interventional cardiology Dr. Bridges and Dr. Hooper    It was decided patient probably would get benefit from two-vessel CABG so patient is now admitted to the CV surgery for two-vessel CABG  We will hold the Plavix  Patient underwent two-vessel CABG 12/6/2019  Currently extubated  Lying in bed  Is complaining of some chest wall pain  Is being paced underlying rhythm is bradycardia  Off Levophed  On insulin drip  We will monitor rhythm  Potassium is 3.4 we will replete and monitor  BUN/creatinine are 18/1.37 we will monitor  Patient's hemoglobin is low at 5.9, we will transfuse and monitor hemoglobin  Discussed with RN taking care of patient to coordinate care  Discussed with family by bedside    I discussed the patients findings and my recommendations with patient and Attending nurse    Shreyas Rodriguez MD  12/07/19  1:16 PM

## 2019-12-07 NOTE — PLAN OF CARE
Problem: Patient Care Overview  Goal: Plan of Care Review  Outcome: Ongoing (interventions implemented as appropriate)  Flowsheets (Taken 12/7/2019 5047)  Plan of Care Reviewed With: patient; other (see comments) (family friend)  Outcome Summary: 57 yo male POD 1 s/p CABG x2. Pt previously independent, and resides with 11 y.o dtr who he is primary caregiver of. Pt with fair tolerance to activity of therapy eval, limited by reports of dizziness, nausea. Was able to progress standing tolerance and transfer to chair with x2 assist for line management. Recommend to fam friend that pt arrange initial 24 hr suprvision/ assist of an adult upon d/c.

## 2019-12-07 NOTE — PLAN OF CARE
Problem: Patient Care Overview  Goal: Plan of Care Review  Outcome: Ongoing (interventions implemented as appropriate)  Flowsheets (Taken 12/7/2019 0371)  Progress: improving  Plan of Care Reviewed With: patient; friend  Outcome Summary: Patient reluctant to do therapy. DId not ambulate in [place], marched in place several times but complained of dizziness after standing for just a short time. Poor attempts with his incentive spirometer but was willing to try often to improve. [place] Var[place] and art line discontinued. Minimal drainage from chest tube today, no significant dumping with standing. Will continue to monitor.

## 2019-12-07 NOTE — NURSING NOTE
Notified Dr. Boyle of pt's Hgb 7.1 and Phos 1.2. MD ordered to transfuse 1 unit PRBC. Pt stable. Vitals remain stable. No acute distress noted. Pt remains in bed due to weakness and appeared to be near syncope. Was placed back in bed by 2 assist while attempting to dangle pt to chair. Pt unharmed but drowsy and asking for ice chips and water. Will continue to monitor.

## 2019-12-08 ENCOUNTER — APPOINTMENT (OUTPATIENT)
Dept: GENERAL RADIOLOGY | Facility: HOSPITAL | Age: 59
End: 2019-12-08

## 2019-12-08 LAB
ABO + RH BLD: NORMAL
ALBUMIN SERPL-MCNC: 4.4 G/DL (ref 3.5–5.2)
ALBUMIN/GLOB SERPL: 2.4 G/DL
ALP SERPL-CCNC: 46 U/L (ref 39–117)
ALT SERPL W P-5'-P-CCNC: 9 U/L (ref 1–41)
ANION GAP SERPL CALCULATED.3IONS-SCNC: 10 MMOL/L (ref 5–15)
AST SERPL-CCNC: 27 U/L (ref 1–40)
BH BB BLOOD EXPIRATION DATE: NORMAL
BH BB BLOOD TYPE BARCODE: 5100
BH BB BLOOD TYPE BARCODE: 5100
BH BB BLOOD TYPE BARCODE: 6200
BH BB BLOOD TYPE BARCODE: 6200
BH BB DISPENSE STATUS: NORMAL
BH BB PRODUCT CODE: NORMAL
BH BB UNIT NUMBER: NORMAL
BILIRUB SERPL-MCNC: 0.8 MG/DL (ref 0.2–1.2)
BUN BLD-MCNC: 18 MG/DL (ref 6–20)
BUN/CREAT SERPL: 19.1 (ref 7–25)
CA-I SERPL ISE-MCNC: 1.2 MMOL/L (ref 1.2–1.3)
CALCIUM SPEC-SCNC: 8.7 MG/DL (ref 8.6–10.5)
CHLORIDE SERPL-SCNC: 100 MMOL/L (ref 98–107)
CK SERPL-CCNC: 647 U/L (ref 20–200)
CO2 SERPL-SCNC: 26 MMOL/L (ref 22–29)
CREAT BLD-MCNC: 0.94 MG/DL (ref 0.76–1.27)
DEPRECATED RDW RBC AUTO: 46.4 FL (ref 37–54)
ERYTHROCYTE [DISTWIDTH] IN BLOOD BY AUTOMATED COUNT: 14.2 % (ref 12.3–15.4)
GFR SERPL CREATININE-BSD FRML MDRD: 83 ML/MIN/1.73
GLOBULIN UR ELPH-MCNC: 1.8 GM/DL
GLUCOSE BLD-MCNC: 125 MG/DL (ref 65–99)
GLUCOSE BLDC GLUCOMTR-MCNC: 101 MG/DL (ref 70–105)
GLUCOSE BLDC GLUCOMTR-MCNC: 103 MG/DL (ref 70–105)
GLUCOSE BLDC GLUCOMTR-MCNC: 109 MG/DL (ref 70–105)
GLUCOSE BLDC GLUCOMTR-MCNC: 111 MG/DL (ref 70–105)
GLUCOSE BLDC GLUCOMTR-MCNC: 122 MG/DL (ref 70–105)
GLUCOSE BLDC GLUCOMTR-MCNC: 146 MG/DL (ref 70–105)
GLUCOSE BLDC GLUCOMTR-MCNC: 75 MG/DL (ref 70–105)
GLUCOSE BLDC GLUCOMTR-MCNC: 90 MG/DL (ref 70–105)
HCT VFR BLD AUTO: 23.1 % (ref 37.5–51)
HGB BLD-MCNC: 8.3 G/DL (ref 13–17.7)
MAGNESIUM SERPL-MCNC: 2.4 MG/DL (ref 1.6–2.6)
MCH RBC QN AUTO: 33.2 PG (ref 26.6–33)
MCHC RBC AUTO-ENTMCNC: 36.2 G/DL (ref 31.5–35.7)
MCV RBC AUTO: 91.7 FL (ref 79–97)
PHOSPHATE SERPL-MCNC: 2.4 MG/DL (ref 2.5–4.5)
PLATELET # BLD AUTO: 152 10*3/MM3 (ref 140–450)
PMV BLD AUTO: 7.4 FL (ref 6–12)
POTASSIUM BLD-SCNC: 3.9 MMOL/L (ref 3.5–5.2)
PROT SERPL-MCNC: 6.2 G/DL (ref 6–8.5)
RBC # BLD AUTO: 2.51 10*6/MM3 (ref 4.14–5.8)
SODIUM BLD-SCNC: 136 MMOL/L (ref 136–145)
UNIT  ABO: NORMAL
UNIT  RH: NORMAL
WBC NRBC COR # BLD: 8.3 10*3/MM3 (ref 3.4–10.8)

## 2019-12-08 PROCEDURE — 93010 ELECTROCARDIOGRAM REPORT: CPT | Performed by: INTERNAL MEDICINE

## 2019-12-08 PROCEDURE — 85027 COMPLETE CBC AUTOMATED: CPT | Performed by: NURSE PRACTITIONER

## 2019-12-08 PROCEDURE — 82550 ASSAY OF CK (CPK): CPT | Performed by: INTERNAL MEDICINE

## 2019-12-08 PROCEDURE — 25010000002 METOCLOPRAMIDE PER 10 MG: Performed by: NURSE PRACTITIONER

## 2019-12-08 PROCEDURE — 71045 X-RAY EXAM CHEST 1 VIEW: CPT

## 2019-12-08 PROCEDURE — 82330 ASSAY OF CALCIUM: CPT | Performed by: INTERNAL MEDICINE

## 2019-12-08 PROCEDURE — 99232 SBSQ HOSP IP/OBS MODERATE 35: CPT | Performed by: INTERNAL MEDICINE

## 2019-12-08 PROCEDURE — 84100 ASSAY OF PHOSPHORUS: CPT | Performed by: INTERNAL MEDICINE

## 2019-12-08 PROCEDURE — 83735 ASSAY OF MAGNESIUM: CPT | Performed by: INTERNAL MEDICINE

## 2019-12-08 PROCEDURE — 99024 POSTOP FOLLOW-UP VISIT: CPT | Performed by: NURSE PRACTITIONER

## 2019-12-08 PROCEDURE — 82962 GLUCOSE BLOOD TEST: CPT

## 2019-12-08 PROCEDURE — 25010000002 CEFAZOLIN PER 500 MG: Performed by: INTERNAL MEDICINE

## 2019-12-08 PROCEDURE — 80053 COMPREHEN METABOLIC PANEL: CPT | Performed by: INTERNAL MEDICINE

## 2019-12-08 PROCEDURE — 97116 GAIT TRAINING THERAPY: CPT

## 2019-12-08 PROCEDURE — 97163 PT EVAL HIGH COMPLEX 45 MIN: CPT

## 2019-12-08 PROCEDURE — 25010000002 ENOXAPARIN PER 10 MG: Performed by: INTERNAL MEDICINE

## 2019-12-08 RX ORDER — ATORVASTATIN CALCIUM 40 MG/1
40 TABLET, FILM COATED ORAL NIGHTLY
Status: DISCONTINUED | OUTPATIENT
Start: 2019-12-08 | End: 2019-12-10 | Stop reason: HOSPADM

## 2019-12-08 RX ORDER — LEVOTHYROXINE SODIUM 0.07 MG/1
75 TABLET ORAL
Status: DISCONTINUED | OUTPATIENT
Start: 2019-12-08 | End: 2019-12-10 | Stop reason: HOSPADM

## 2019-12-08 RX ORDER — ACETAMINOPHEN 325 MG/1
650 TABLET ORAL EVERY 6 HOURS PRN
Status: DISCONTINUED | OUTPATIENT
Start: 2019-12-08 | End: 2019-12-10 | Stop reason: HOSPADM

## 2019-12-08 RX ORDER — METOCLOPRAMIDE HYDROCHLORIDE 5 MG/ML
10 INJECTION INTRAMUSCULAR; INTRAVENOUS EVERY 6 HOURS
Status: COMPLETED | OUTPATIENT
Start: 2019-12-08 | End: 2019-12-09

## 2019-12-08 RX ADMIN — HYDROCODONE BITARTRATE AND ACETAMINOPHEN 1 TABLET: 5; 325 TABLET ORAL at 01:07

## 2019-12-08 RX ADMIN — METOCLOPRAMIDE 10 MG: 5 INJECTION, SOLUTION INTRAMUSCULAR; INTRAVENOUS at 10:04

## 2019-12-08 RX ADMIN — WATER 2 G: 1000 INJECTION, SOLUTION INTRAVENOUS at 05:05

## 2019-12-08 RX ADMIN — WATER 2 G: 1000 INJECTION, SOLUTION INTRAVENOUS at 16:25

## 2019-12-08 RX ADMIN — DOCUSATE SODIUM 100 MG: 100 CAPSULE, LIQUID FILLED ORAL at 21:58

## 2019-12-08 RX ADMIN — LEVOTHYROXINE SODIUM 75 MCG: 75 TABLET ORAL at 11:51

## 2019-12-08 RX ADMIN — MUPIROCIN 1 APPLICATION: 20 OINTMENT TOPICAL at 22:02

## 2019-12-08 RX ADMIN — DOCUSATE SODIUM 100 MG: 100 CAPSULE, LIQUID FILLED ORAL at 09:15

## 2019-12-08 RX ADMIN — METOCLOPRAMIDE 10 MG: 5 INJECTION, SOLUTION INTRAMUSCULAR; INTRAVENOUS at 22:54

## 2019-12-08 RX ADMIN — MUPIROCIN 1 APPLICATION: 20 OINTMENT TOPICAL at 10:08

## 2019-12-08 RX ADMIN — SENNOSIDES 1 TABLET: 8.6 TABLET, FILM COATED ORAL at 21:57

## 2019-12-08 RX ADMIN — HYDROCODONE BITARTRATE AND ACETAMINOPHEN 1 TABLET: 5; 325 TABLET ORAL at 12:29

## 2019-12-08 RX ADMIN — POLYETHYLENE GLYCOL 3350 17 G: 17 POWDER, FOR SOLUTION ORAL at 09:15

## 2019-12-08 RX ADMIN — ACETAMINOPHEN 650 MG: 325 TABLET ORAL at 10:04

## 2019-12-08 RX ADMIN — METOCLOPRAMIDE 10 MG: 5 INJECTION, SOLUTION INTRAMUSCULAR; INTRAVENOUS at 16:25

## 2019-12-08 RX ADMIN — SENNOSIDES 1 TABLET: 8.6 TABLET, FILM COATED ORAL at 09:15

## 2019-12-08 RX ADMIN — ACETAMINOPHEN 650 MG: 325 TABLET ORAL at 21:56

## 2019-12-08 RX ADMIN — POTASSIUM PHOSPHATE, MONOBASIC AND POTASSIUM PHOSPHATE, DIBASIC 10 MMOL: 224; 236 INJECTION, SOLUTION, CONCENTRATE INTRAVENOUS at 09:15

## 2019-12-08 RX ADMIN — POLYETHYLENE GLYCOL 3350 17 G: 17 POWDER, FOR SOLUTION ORAL at 21:55

## 2019-12-08 RX ADMIN — PANTOPRAZOLE SODIUM 40 MG: 40 TABLET, DELAYED RELEASE ORAL at 05:06

## 2019-12-08 RX ADMIN — CHLORHEXIDINE GLUCONATE 0.12% ORAL RINSE 15 ML: 1.2 LIQUID ORAL at 09:15

## 2019-12-08 RX ADMIN — ATORVASTATIN CALCIUM 40 MG: 40 TABLET, FILM COATED ORAL at 21:57

## 2019-12-08 RX ADMIN — HYDROCODONE BITARTRATE AND ACETAMINOPHEN 1 TABLET: 5; 325 TABLET ORAL at 16:25

## 2019-12-08 RX ADMIN — ENOXAPARIN SODIUM 40 MG: 40 INJECTION SUBCUTANEOUS at 16:25

## 2019-12-08 RX ADMIN — METOPROLOL TARTRATE 12.5 MG: 25 TABLET ORAL at 10:04

## 2019-12-08 RX ADMIN — ASPIRIN 325 MG: 325 TABLET, DELAYED RELEASE ORAL at 09:15

## 2019-12-08 RX ADMIN — CHLORHEXIDINE GLUCONATE 0.12% ORAL RINSE 15 ML: 1.2 LIQUID ORAL at 22:02

## 2019-12-08 RX ADMIN — METOPROLOL TARTRATE 12.5 MG: 25 TABLET ORAL at 21:58

## 2019-12-08 NOTE — PROGRESS NOTES
Reason for follow-up: Severe LAD disease  Unstable angina     Patient Care Team:  Sandra Cha PA as PCP - General  James Hernandez MD as Consulting Physician (Cardiology)  Marilyn Goff MD as Consulting Physician (Nephrology)    Subjective .   Is complaining of some chest wall pain, no aggravating or relieving factor.  Patient is complaining of headache with no aggravating or relieving factors are associated symptoms     Review of Systems   Constitution: Positive for malaise/fatigue. Negative for fever.   HENT: Negative for congestion and hearing loss.    Eyes: Negative for double vision and visual disturbance.   Cardiovascular: Positive for chest pain. Negative for claudication, dyspnea on exertion, leg swelling and syncope.   Respiratory: Negative for cough and shortness of breath.    Endocrine: Negative for cold intolerance.   Skin: Negative for color change and rash.   Musculoskeletal: Negative for arthritis and joint pain.   Gastrointestinal: Negative for abdominal pain and heartburn.   Genitourinary: Negative for hematuria.   Neurological: Positive for headaches. Negative for excessive daytime sleepiness and dizziness.   Psychiatric/Behavioral: Negative for depression. The patient is not nervous/anxious.    All other systems reviewed and are negative.      Patient has no known allergies.    Scheduled Meds:    aspirin 325 mg Oral Daily   atorvastatin 40 mg Oral Nightly   ceFAZolin 2 g Intravenous Q12H   chlorhexidine 15 mL Mouth/Throat Q12H   docusate sodium 100 mg Oral BID   enoxaparin 40 mg Subcutaneous Daily   [START ON 12/9/2019] insulin lispro 0-9 Units Subcutaneous 4x Daily With Meals & Nightly   levothyroxine 75 mcg Oral Q AM   metoclopramide 10 mg Intravenous Q6H   metoprolol tartrate 12.5 mg Oral Q12H   mupirocin 1 application Each Nare BID   pantoprazole 40 mg Oral Q AM   polyethylene glycol 17 g Oral BID   senna 1 tablet Oral BID     Continuous Infusions:    insulin  "0-50 Units/hr Last Rate: 2.7 Units/hr (12/08/19 1154)   norepinephrine 0.02-0.04 mcg/kg/min Last Rate: Stopped (12/07/19 0840)     PRN Meds:.•  acetaminophen  •  dextrose  •  dextrose  •  glucagon (human recombinant)  •  HYDROcodone-acetaminophen  •  [START ON 12/9/2019] insulin lispro **AND** [START ON 12/9/2019] insulin lispro  •  insulin  •  Morphine **AND** naloxone  •  MOUTH KOTE  •  ondansetron  •  potassium chloride **OR** potassium chloride  •  potassium chloride **OR** potassium chloride    Objective   Looks comfortable lying in the bed    VITAL SIGNS  Vitals:    12/08/19 0545 12/08/19 0600 12/08/19 0741 12/08/19 1153   BP: 95/55      Pulse: 90      Resp:       Temp:   98.4 °F (36.9 °C) 98.6 °F (37 °C)   TempSrc:       SpO2:       Weight:  90.3 kg (199 lb 1.2 oz)     Height:           Flowsheet Rows      First Filed Value   Admission Height  177.8 cm (70\") Documented at 12/04/2019 1632   Admission Weight  87.9 kg (193 lb 12.6 oz) Documented at 12/04/2019 1632           TELEMETRY: Sinus rhythm    Physical Exam:  Physical Exam   Constitutional: He is oriented to person, place, and time. He appears well-developed and well-nourished. He is cooperative.   HENT:   Head: Normocephalic and atraumatic.   Mouth/Throat: Uvula is midline and oropharynx is clear and moist. No oral lesions.   Eyes: Conjunctivae are normal. No scleral icterus.   Neck: Trachea normal. Neck supple. No JVD present. Carotid bruit is not present. No thyromegaly present.   Cardiovascular: Normal rate, regular rhythm, S1 normal, S2 normal, normal heart sounds, intact distal pulses and normal pulses. PMI is not displaced. Exam reveals no gallop and no friction rub.   No murmur heard.  Pulmonary/Chest: Effort normal and breath sounds normal.   Chest tube present   Abdominal: Soft. Bowel sounds are normal.   Genitourinary:   Genitourinary Comments: Husain catheter present   Musculoskeletal: Normal range of motion.   Neurological: He is alert and " oriented to person, place, and time. He has normal strength.   No focal deficits   Skin: Skin is warm. No cyanosis.   Right groin no hematoma   Psychiatric: He has a normal mood and affect.                LAB RESULTS (LAST 7 DAYS)    CBC  Results from last 7 days   Lab Units 12/08/19  0519 12/07/19  0255 12/07/19  0229 12/06/19  2322 12/06/19  2202 12/06/19  2135 12/06/19  2100 12/06/19 2045 12/06/19  1929  12/06/19  1737  12/06/19  0550 12/05/19  1507   WBC 10*3/mm3 8.30 7.30  --   --   --   --   --  6.30 7.80  --  6.30  --  6.30 6.50   RBC 10*6/mm3 2.51* 2.12*  --   --   --   --   --  2.60* 2.89*  --  2.48*  --  4.07* 4.33   HEMOGLOBIN g/dL 8.3* 7.1*  --   --   --   --   --  8.8* 9.8*  --  8.4*  --  13.7 14.5   HEMOGLOBIN, POC g/dL  --   --  5.9* 7.6* 8.5* 8.1* 7.9*  --   --    < >  --    < >  --   --    HEMATOCRIT % 23.1* 19.3*  --   --   --   --   --  23.8* 26.4*  --  23.2*  --  38.1 40.2   HEMATOCRIT POC %  --   --  17* 22* 25* 24* 23*  --   --    < >  --    < >  --   --    MCV fL 91.7 91.2  --   --   --   --   --  91.6 91.1  --  93.4  --  93.5 92.7   PLATELETS 10*3/mm3 152 190  --   --   --   --   --  172 123*  --  117*  117*  --  188 203    < > = values in this interval not displayed.       BMP  Results from last 7 days   Lab Units 12/08/19  0519 12/07/19  1157 12/07/19  0255 12/06/19 2045 12/06/19  0550 12/05/19  0557 12/04/19  1533   SODIUM mmol/L 136  --  147* 140 138 140 142   POTASSIUM mmol/L 3.9 4.3 3.6 3.9 4.2 4.6 4.6   CHLORIDE mmol/L 100  --  108* 107 101 106 105   CO2 mmol/L 26.0  --  27.0 23.0 28.0 25.0 29.0   BUN mg/dL 18  --  18 15 15 22* 25*   CREATININE mg/dL 0.94  --  1.37* 1.14 1.13 1.10 1.43*   GLUCOSE mg/dL 125*  --  137* 134* 93 94 96   MAGNESIUM mg/dL 2.4  --  2.8*  --  2.1  --   --    PHOSPHORUS mg/dL 2.4*  --  1.2* 3.3 3.2  --   --        CMP   Results from last 7 days   Lab Units 12/08/19 0519 12/07/19  1157 12/07/19  0255 12/06/19 2045 12/06/19 0550 12/05/19 0557 12/04/19  1533    SODIUM mmol/L 136  --  147* 140 138 140 142   POTASSIUM mmol/L 3.9 4.3 3.6 3.9 4.2 4.6 4.6   CHLORIDE mmol/L 100  --  108* 107 101 106 105   CO2 mmol/L 26.0  --  27.0 23.0 28.0 25.0 29.0   BUN mg/dL 18  --  18 15 15 22* 25*   CREATININE mg/dL 0.94  --  1.37* 1.14 1.13 1.10 1.43*   GLUCOSE mg/dL 125*  --  137* 134* 93 94 96   ALBUMIN g/dL 4.40  --  4.60 4.10 4.00 3.90  --    BILIRUBIN mg/dL 0.8  --   --  0.7 0.4 0.5  --    ALK PHOS U/L 46  --   --  48 92 91  --    AST (SGOT) U/L 27  --   --  24 20 17  --    ALT (SGPT) U/L 9  --   --  15 25 21  --          BNP        TROPONIN  Results from last 7 days   Lab Units 12/08/19  0519   CK TOTAL U/L 647*       CoAg  Results from last 7 days   Lab Units 12/06/19  2045 12/06/19  1929 12/06/19  1737 12/05/19  1507 12/04/19  1533   INR  1.30* 1.22* 1.33* 1.06 1.10   APTT seconds 27.3 29.0 27.8 30.1  --        Creatinine Clearance  Estimated Creatinine Clearance: 98 mL/min (by C-G formula based on SCr of 0.94 mg/dL).    ABG  Results from last 7 days   Lab Units 12/07/19  0229 12/06/19  2322 12/06/19  2202 12/06/19  2135 12/06/19  2100 12/06/19  1905 12/06/19  1715   PH, ARTERIAL pH units 7.385 7.390 7.254* 7.275* 7.392 7.409 7.430   PCO2, ARTERIAL mm Hg 48.1* 46.5 57.7* 56.1* 42.1 40.0 41.0   PO2 ART mm Hg 98.3 133.9* 93.6 112.6* 322.6* 263.0* 336.0*   O2 SATURATION ART % 97.4 99.0* 95.7 97.6 99.9* 100.0* 100.0*   BASE EXCESS ART mmol/L 3.4* 2.8 -1.9* -1.0* 0.6 1.0 3.0       Radiology  Xr Chest 1 View    Result Date: 12/8/2019   1. Interval removal of [place]-Prabhakar catheter 2. Right internal jugular sheath, mediastinal drain and left chest tube remain in good position 3. Bilateral atelectatic changes are seen which appears stable from yesterday's study  Electronically Signed By-Delvin Cordova Jr. On:12/8/2019 8:11 AM This report was finalized on [id number] by  Delvin Cordova Jr., .    Xr Chest 1 View    Result Date: 12/7/2019   1. Interval removal of ET tube and NG tube 2. Bilateral  basilar areas of atelectasis which appears stable 3. [place]-Prabhakar catheter, mediastinal drain and left chest tube remain in good position  Electronically Signed By-Delvin Cordova Jr. On:12/7/2019 7:38 AM This report was finalized on [id number] by  Delvin Cordova Jr., .    Xr Chest 1 View    Result Date: 12/6/2019  Interval CABG with plate atelectasis in each lung base. The side-port of the NG tube is in the distal esophagus with the tip near the GE junction. This should be advanced approximately 10 cm. Other support lines and tubes are in good position.   Electronically Signed By-Rosalio Canales DO. On:12/6/2019 10:01 PM This report was finalized on [id number] by  Rosalio Canales DO..            EKG    I personally viewed and interpreted the patient's EKG/Telemetry data:    ECHOCARDIOGRAM:       STRESS MYOVIEW:    CARDIAC CATHETERIZATION:    OTHER:         Assessment/Plan       Abnormal nuclear stress test    Angina pectoris (CMS/HCC)    Coronary artery disease involving native coronary artery of native heart with unstable angina pectoris (CMS/HCC)    Essential hypertension    Coronary artery disease      Patient underwent cardiac cath which showed severe LAD and diagonal artery disease  Films reviewed with interventional cardiology Dr. Bridges and Dr. Hooper    It was decided patient probably would get benefit from two-vessel CABG so patient is now admitted to the CV surgery for two-vessel CABG  We will hold the Plavix  Patient underwent two-vessel CABG 12/6/2019  Currently extubated  Lying in bed  Is complaining of some chest wall pain  Is being paced underlying rhythm is bradycardia  Off Levophed  On insulin drip  We will monitor rhythm  Potassium was low was repleted and is come up to 3.9 we will continue to monitor  BUN/creatinine are 18/ 0.94 we will monitor  Patient's hemoglobin was low at 5.9 was transfused has come up to 8.3 will continue to monitor  Symptomatic therapy for headaches  Discussed with RN taking care  of patient to coordinate care  Discussed with family by bedside    I discussed the patients findings and my recommendations with patient and Attending nurse    Shreyas Rodriguez MD  12/08/19  1:24 PM

## 2019-12-08 NOTE — THERAPY EVALUATION
Patient Name: Duke Freire  : 1960    MRN: [id number]                              Today's Date: 2019       Admit Date: 2019    Visit Dx:     ICD-10-CM ICD-9-CM   1. Abnormal nuclear stress test R94.39 794.39   2. Angina pectoris (CMS/HCC) I20.9 413.9   3. Coronary artery disease involving native coronary artery of native heart with unstable angina pectoris (CMS/HCC) I25.110 414.01     411.1   4. Essential hypertension I10 401.9     Patient Active Problem List   Diagnosis   • Abnormal nuclear stress test   • Angina pectoris (CMS/HCC)   • Coronary artery disease involving native coronary artery of native heart with unstable angina pectoris (CMS/HCC)   • Essential hypertension   • Coronary artery disease     Past Medical History:   Diagnosis Date   • Coronary artery disease      Past Surgical History:   Procedure Laterality Date   • CARDIAC CATHETERIZATION Left 2019    Procedure: Left Heart Cath;  Surgeon: James Hernandez MD;  Location:  [facility];  Service: Cardiovascular   • CARDIAC CATHETERIZATION N/A 2019    Procedure: Coronary angiography;  Surgeon: James Hernandez MD;  Location:  [facility];  Service: Cardiovascular   • CORONARY ANGIOPLASTY WITH STENT PLACEMENT     • INNER EAR SURGERY       General Information     Row Name 19 1734          PT Evaluation Time/Intention    Document Type  evaluation  -     Mode of Treatment  physical therapy  -     Row Name 19 1734          General Information    Patient Profile Reviewed?  yes  -     Prior Level of Function  independent:  -     Existing Precautions/Restrictions  cardiac  -     Row Name 19 1734          Relationship/Environment    Lives With  child(janet), dependent full custody of his 10 yo dtr  -     Row Name 19 1734          Resource/Environmental Concerns    Current Living Arrangements  home/apartment/condo  -     Row Name  12/08/19 1734          Home Main Entrance    Number of Stairs, Main Entrance  none  -     Row Name 12/08/19 1734          Stairs Within Home, Primary    Number of Stairs, Within Home, Primary  none  -     Row Name 12/08/19 1734          Cognitive Assessment/Intervention- PT/OT    Orientation Status (Cognition)  oriented x 4  -     Row Name 12/08/19 1734          Safety Issues, Functional Mobility    Impairments Affecting Function (Mobility)  endurance/activity tolerance;pain  -       User Key  (r) = Recorded By, (t) = Taken By, (c) = Cosigned By    Initials Name Provider Type     Carmen Rebollar, PT Physical Therapist        Mobility     Row Name 12/08/19 1735          Bed Mobility Assessment/Treatment    Bed Mobility Assessment/Treatment  sit-supine  -     Sit-Supine [place] (Bed Mobility)  moderate assist (50% patient effort)  -     Comment (Bed Mobility)  to get LEs into bed and reposition once supine  -     Row Name 12/08/19 1735          Sit-Stand Transfer    Sit-Stand [place] (Transfers)  minimum assist (75% patient effort)  -     Row Name 12/08/19 1735          Gait/Stairs Assessment/Training    Gait/Stairs Assessment/Training  gait/ambulation independence;gait/ambulation assistive device  -     Assistive Device (Gait)  walker, front-wheeled  -     Distance in Feet (Gait)  150'  -     Comment (Gait/Stairs)  does well, still using RW due to CT and just started walking in [place] today  -       User Key  (r) = Recorded By, (t) = Taken By, (c) = Cosigned By    Initials Name Provider Type     Carmen Rebollar, PT Physical Therapist        Obj/Interventions     Row Name 12/08/19 1736          General ROM    GENERAL ROM COMMENTS  AROM WFLs BUE/LE, aware of limiting overhead ROM due to sternal precautions  -     Row Name 12/08/19 1736          MMT (Manual Muscle Testing)    General MMT Comments  LEs 5/5  -     Row Name 12/08/19 1736          Static Sitting Balance    Level of  [place] (Unsupported Sitting, Static Balance)  independent  -[facility] Name 12/08/19 1736          Dynamic Sitting Balance    Level of [place], Reaches Outside Midline (Sitting, Dynamic Balance)  independent  -[facility] Name 12/08/19 1736          Static Standing Balance    Level of [place] (Supported Standing, Static Balance)  supervision  -[facility] Name 12/08/19 1736          Sensory Assessment/Intervention    Sensory General Assessment  no sensation deficits identified  -       User Key  (r) = Recorded By, (t) = Taken By, (c) = Cosigned By    Initials Name Provider Type    Carmen Ansari, PT Physical Therapist        Goals/Plan     [facility] Name 12/08/19 1739          Bed Mobility Goal 1 (PT)    Activity/Assistive Device (Bed Mobility Goal 1, PT)  sit to supine/supine to sit  -     [place] Level/Cues Needed (Bed Mobility Goal 1, PT)  independent  -     Time Frame (Bed Mobility Goal 1, PT)  2 weeks  -[facility] Name 12/08/19 1739          Transfer Goal 1 (PT)    Activity/Assistive Device (Transfer Goal 1, PT)  sit-to-stand/stand-to-sit;bed-to-chair/chair-to-bed  -     [place] Level/Cues Needed (Transfer Goal 1, PT)  independent  -     Time Frame (Transfer Goal 1, PT)  2 weeks  -[facility] Name 12/08/19 1739          Gait Training Goal 1 (PT)    Activity/Assistive Device (Gait Training Goal 1, PT)  gait (walking locomotion)  -     [place] Level (Gait Training Goal 1, PT)  independent  -     Distance (Gait Goal 1, PT)  400'  -     Time Frame (Gait Training Goal 1, PT)  2 weeks  -[facility] Name 12/08/19 1739          Patient Education Goal (PT)    Activity (Patient Education Goal, PT)  HEP  -     [place]/Cues/Accuracy (Memory Goal 2, PT)  demonstrates adequately  -     Time Frame (Patient Education Goal, PT)  2 weeks  -       User Key  (r) = Recorded By, (t) = Taken By, (c) = Cosigned By    Initials Name Provider Type    Carmen Ansari, PT Physical  Therapist        Clinical Impression     Row Name 12/08/19 1737          Pain Scale: Numbers Pre/Post-Treatment    Pain Scale: Numbers, Pretreatment  3/10  -     Pain Scale: Numbers, Post-Treatment  3/10  -     Pain Location  chest incisional  -     Row Name 12/08/19 1737          Plan of Care Review    Plan of Care Reviewed With  patient  -     Progress  improving  -     Outcome Summary  Pt is s/p CABG on 12/6.  Pt feels much better today and wants to walk.  Reviewed post op sternal precautions and pt using his heart hugger.  Pt still needs min A to stand from recliner and mod A to return to supine.  Anticipate steady progress through cardiac levels with plans for home at d/c.   -     Row Name 12/08/19 1737          Physical Therapy Clinical Impression    Criteria for Skilled Interventions Met (PT Clinical Impression)  yes;treatment indicated  -     Rehab Potential (PT Clinical Summary)  good, to achieve stated therapy goals  -     Row Name 12/08/19 1737          Vital Signs    O2 Delivery Pre Treatment  supplemental O2 2L, RN took off prior to walking but sats 87-88%, replaced at 2L for activity  -     O2 Delivery Intra Treatment  supplemental O2  -     Post SpO2 (%)  93  -     O2 Delivery Post Treatment  supplemental O2  -     Row Name 12/08/19 1737          Positioning and Restraints    Pre-Treatment Position  sitting in chair/recliner  -     Post Treatment Position  bed  -     In Chair  notified nsg;call light within reach  -       User Key  (r) = Recorded By, (t) = Taken By, (c) = Cosigned By    Initials Name Provider Type     Carmen Rebollar, PT Physical Therapist        Outcome Measures    No documentation.         PT Recommendation and Plan  Planned Therapy Interventions (PT Eval): bed mobility training, gait training, transfer training, home exercise program, strengthening, patient/family education  Outcome Summary/Treatment Plan (PT)  Anticipated Discharge Disposition (PT):  home  Plan of Care Reviewed With: patient  Progress: improving  Outcome Summary: Pt is s/p CABG on 12/6.  Pt feels much better today and wants to walk.  Reviewed post op sternal precautions and pt using his heart hugger.  Pt still needs min A to stand from recliner and mod A to return to supine.  Anticipate steady progress through cardiac levels with plans for home at d/c.      Time Calculation:   PT Charges     Row Name 12/08/19 1741             Time Calculation    Start Time  1430  -      Stop Time  1455  -      Time Calculation (min)  25 min  -      PT Received On  12/08/19  -      PT - Next Appointment  12/10/19  -      PT Goal Re-Cert Due Date  12/22/19  -         Time Calculation- PT    Total Timed Code Minutes- PT  10 minute(s)  -        User Key  (r) = Recorded By, (t) = Taken By, (c) = Cosigned By    Initials Name Provider Type     Carmen Rebollar, PT Physical Therapist        Therapy Charges for Today     Code Description Service Date Service Provider Modifiers Qty    91088300393 HC PT EVAL HIGH COMPLEXITY 3 12/8/2019 Carmen Rebollar, PT GP 1    [id number] HC GAIT TRAINING EA 15 MIN 12/8/2019 Carmen Rebollar, PT GP 1               Carmen Rebollar PT  12/8/2019

## 2019-12-08 NOTE — PLAN OF CARE
Problem: Patient Care Overview  Goal: Plan of Care Review  Outcome: Ongoing (interventions implemented as appropriate)  Flowsheets (Taken 12/8/2019 8826)  Plan of Care Reviewed With: patient  Outcome Summary: Pt is s/p CABG on 12/6.  Pt feels much better today and wants to walk.  Reviewed post op sternal precautions and pt using his heart hugger.  Pt still needs min A to stand from recliner and mod A to return to supine.  Anticipate steady progress through cardiac levels with plans for home at d/c.

## 2019-12-08 NOTE — PROGRESS NOTES
"NEPHROLOGY PROGRESS NOTE------[facility]    [facility]  [phone]  Luis Goff MD      Patient Care Team:  Sandra Cha PA as PCP - General  James Hernandez MD as Consulting Physician (Cardiology)  Marilyn Goff MD as Consulting Physician (Nephrology)      Provider:  Luis Goff MD  Patient Name: Duke Freire  :  1960    SUBJECTIVE:    Up in chair. No SOB, CP, dysuria. Still with holman    Medication:    aspirin 325 mg Oral Daily   ceFAZolin 2 g Intravenous Q12H   chlorhexidine 15 mL Mouth/Throat Q12H   docusate sodium 100 mg Oral BID   enoxaparin 30 mg Subcutaneous Daily   [START ON 2019] insulin lispro 0-9 Units Subcutaneous 4x Daily With Meals & Nightly   mupirocin 1 application Each Nare BID   pantoprazole 40 mg Oral Q AM   polyethylene glycol 17 g Oral BID   senna 1 tablet Oral BID       insulin 0-50 Units/hr    norepinephrine 0.02-0.04 mcg/kg/min Last Rate: Stopped (19 0840)   sodium chloride 75 mL/hr Last Rate: 75 mL/hr (19 0830)       OBJECTIVE    Vital Sign Min/Max for last 24 hours  Temp  Min: 97.8 °F (36.6 °C)  Max: 98.8 °F (37.1 °C)   BP  Min: 94/61  Max: 121/66   Pulse  Min: 90  Max: 90   Resp  Min: 16  Max: 16   SpO2  Min: 95 %  Max: 100 %   No data recorded   Weight  Min: 90.3 kg (199 lb 1.2 oz)  Max: 90.3 kg (199 lb 1.2 oz)     Flowsheet Rows      First Filed Value   Admission Height  177.8 cm (70\") Documented at 2019 1632   Admission Weight  87.9 kg (193 lb 12.6 oz) Documented at 2019 1632          No intake/output data recorded.  I/O last 3 completed shifts:  In: 4087 [I.V.:2268; Blood:569; IV Piggyback:1250]  Out: 4035 [Urine:1795; Other:600; Blood:900; Chest Tube:740]    Physical Exam:  General Appearance: alert, appears stated age and cooperative  Head: normocephalic, without obvious abnormality and atraumatic  Eyes: conjunctivae and sclerae normal and no " icterus  Neck: supple and no JVD  Lungs: DECREASED BS BIBASILAR  Heart: regular rhythm & normal rate and normal S1, S2 +IRIS  Chest: Wall no abnormalities observed  Abdomen: +HYPOACTIVE BS  Extremities: moves extremities well, no edema, no cyanosis and no redness  Skin: no bleeding, bruising or rash, turgor normal, color normal and no lesions noted  Neurologic: Alert, and oriented. No focal deficits    Labs:    WBC WBC   Date Value Ref Range Status   12/08/2019 8.30 3.40 - 10.80 10*3/mm3 Final   12/07/2019 7.30 3.40 - 10.80 10*3/mm3 Final   12/06/2019 6.30 3.40 - 10.80 10*3/mm3 Final   12/06/2019 7.80 3.40 - 10.80 10*3/mm3 Final   12/06/2019 6.30 3.40 - 10.80 10*3/mm3 Final   12/06/2019 6.30 3.40 - 10.80 10*3/mm3 Final   12/05/2019 6.50 3.40 - 10.80 10*3/mm3 Final      HGB Hemoglobin   Date Value Ref Range Status   12/08/2019 8.3 (L) 13.0 - 17.7 g/dL Final   12/07/2019 7.1 (L) 13.0 - 17.7 g/dL Final   12/07/2019 5.9 (L) 12.0 - 17.0 g/dL Final   12/06/2019 7.6 (L) 12.0 - 17.0 g/dL Final   12/06/2019 8.5 (L) 12.0 - 17.0 g/dL Final   12/06/2019 8.1 (L) 12.0 - 17.0 g/dL Final   12/06/2019 7.9 (L) 12.0 - 17.0 g/dL Final   12/06/2019 8.8 (L) 13.0 - 17.7 g/dL Final   12/06/2019 9.8 (L) 13.0 - 17.7 g/dL Final   12/06/2019 7.8 (C) 12.0 - 17.0 g/dL Final   12/06/2019 8.4 (L) 13.0 - 17.7 g/dL Final     Comment:     Result checked    12/06/2019 7.5 (C) 12.0 - 17.0 g/dL Final   12/06/2019 8.2 (L) 12.0 - 17.0 g/dL Final   12/06/2019 8.2 (L) 12.0 - 17.0 g/dL Final   12/06/2019 8.8 (L) 12.0 - 17.0 g/dL Final   12/06/2019 8.8 (L) 12.0 - 17.0 g/dL Final   12/06/2019 10.2 (L) 12.0 - 17.0 g/dL Final   12/06/2019 13.7 13.0 - 17.7 g/dL Final   12/05/2019 14.5 13.0 - 17.7 g/dL Final      HCT Hematocrit   Date Value Ref Range Status   12/08/2019 23.1 (L) 37.5 - 51.0 % Final   12/07/2019 19.3 (C) 37.5 - 51.0 % Final   12/07/2019 17 (L) 38 - 51 % Final   12/06/2019 22 (L) 38 - 51 % Final   12/06/2019 25 (L) 38 - 51 % Final   12/06/2019 24 (L)  38 - 51 % Final   12/06/2019 23 (L) 38 - 51 % Final   12/06/2019 23.8 (L) 37.5 - 51.0 % Final   12/06/2019 26.4 (L) 37.5 - 51.0 % Final   12/06/2019 23.0 (L) 38 - 51 % Final   12/06/2019 23.2 (L) 37.5 - 51.0 % Final     Comment:     Result checked    12/06/2019 22.0 (L) 38 - 51 % Final   12/06/2019 24.0 (L) 38 - 51 % Final   12/06/2019 24.0 (L) 38 - 51 % Final   12/06/2019 26.0 (L) 38 - 51 % Final   12/06/2019 26.0 (L) 38 - 51 % Final   12/06/2019 30.0 (L) 38 - 51 % Final   12/06/2019 38.1 37.5 - 51.0 % Final   12/05/2019 40.2 37.5 - 51.0 % Final      Platlets No results found for: LABPLAT   MCV MCV   Date Value Ref Range Status   12/08/2019 91.7 79.0 - 97.0 fL Final   12/07/2019 91.2 79.0 - 97.0 fL Final   12/06/2019 91.6 79.0 - 97.0 fL Final   12/06/2019 91.1 79.0 - 97.0 fL Final   12/06/2019 93.4 79.0 - 97.0 fL Final   12/06/2019 93.5 79.0 - 97.0 fL Final   12/05/2019 92.7 79.0 - 97.0 fL Final          Sodium Sodium   Date Value Ref Range Status   12/08/2019 136 136 - 145 mmol/L Final   12/07/2019 147 (H) 136 - 145 mmol/L Final   12/06/2019 140 136 - 145 mmol/L Final   12/06/2019 138 136 - 145 mmol/L Final      Potassium Potassium   Date Value Ref Range Status   12/08/2019 3.9 3.5 - 5.2 mmol/L Final   12/07/2019 4.3 3.5 - 5.2 mmol/L Final   12/07/2019 3.6 3.5 - 5.2 mmol/L Final   12/06/2019 3.9 3.5 - 5.2 mmol/L Final   12/06/2019 4.2 3.5 - 5.2 mmol/L Final      Chloride Chloride   Date Value Ref Range Status   12/08/2019 100 98 - 107 mmol/L Final   12/07/2019 108 (H) 98 - 107 mmol/L Final   12/06/2019 107 98 - 107 mmol/L Final   12/06/2019 101 98 - 107 mmol/L Final      CO2 CO2   Date Value Ref Range Status   12/08/2019 26.0 22.0 - 29.0 mmol/L Final   12/07/2019 27.0 22.0 - 29.0 mmol/L Final   12/06/2019 23.0 22.0 - 29.0 mmol/L Final   12/06/2019 28.0 22.0 - 29.0 mmol/L Final      BUN BUN   Date Value Ref Range Status   12/08/2019 18 6 - 20 mg/dL Final   12/07/2019 18 6 - 20 mg/dL Final   12/06/2019 15 6 - 20  mg/dL Final   12/06/2019 15 6 - 20 mg/dL Final      Creatinine Creatinine   Date Value Ref Range Status   12/08/2019 0.94 0.76 - 1.27 mg/dL Final   12/07/2019 1.37 (H) 0.76 - 1.27 mg/dL Final   12/06/2019 1.14 0.76 - 1.27 mg/dL Final   12/06/2019 1.13 0.76 - 1.27 mg/dL Final      Calcium Calcium   Date Value Ref Range Status   12/08/2019 8.7 8.6 - 10.5 mg/dL Final   12/07/2019 9.4 8.6 - 10.5 mg/dL Final   12/06/2019 9.1 8.6 - 10.5 mg/dL Final   12/06/2019 8.9 8.6 - 10.5 mg/dL Final      PO4 No components found for: PO4   Albumin Albumin   Date Value Ref Range Status   12/08/2019 4.40 3.50 - 5.20 g/dL Final   12/07/2019 4.60 3.50 - 5.20 g/dL Final   12/06/2019 4.10 3.50 - 5.20 g/dL Final   12/06/2019 4.00 3.50 - 5.20 g/dL Final      Magnesium Magnesium   Date Value Ref Range Status   12/08/2019 2.4 1.6 - 2.6 mg/dL Final   12/07/2019 2.8 (H) 1.6 - 2.6 mg/dL Final   12/06/2019 2.1 1.6 - 2.6 mg/dL Final      Uric Acid No components found for: URIC ACID     Imaging Results (Last 72 Hours)     Procedure Component Value Units Date/Time    XR Chest 1 View [[id number]] Collected:  12/08/19 0809     Updated:  12/08/19 0813    Narrative:       DATE OF EXAM:  12/8/2019 5:51 AM     PROCEDURE:  XR CHEST 1 VW-     INDICATIONS:  Post-Op Heart Surgery; R94.39-Abnormal result of other cardiovascular  function study; I20.9-Angina pectoris, unspecified;  I25.110-Atherosclerotic heart disease of native coronary artery with  unstable angina pectoris; I10-Essential (primary) hypertension patient's  a 58-year-old male status post Opperman heart surgery 3 days ago.  History of smoking and high blood pressure     COMPARISON:  Study of 12/07/2019     TECHNIQUE:   Single radiographic AP view of the chest was obtained.     FINDINGS:  Today's portable erect study was obtained on 12/08/2019 at 5:50 AM.     The heart is enlarged. Mediastinal drain and left chest tube are present  a right internal jugular sheath is present. The [place]-Prabhakar catheter  has  been withdrawn. Bilateral basilar areas of atelectasis are seen which  appears stable from yesterday's study. There has been no interval  worsening.        Impression:          1. Interval removal of [place]-Prabhakar catheter  2. Right internal jugular sheath, mediastinal drain and left chest tube  remain in good position  3. Bilateral atelectatic changes are seen which appears stable from  yesterday's study     Electronically Signed By-Delvin Cordova Jr. On:12/8/2019 8:11 AM  This report was finalized on [id number] by  Delvin Cordova Jr., .    XR Chest 1 View [[id number]] Collected:  12/07/19 0736     Updated:  12/07/19 0740    Narrative:       DATE OF EXAM:  12/7/2019 5:52 AM     PROCEDURE:  XR CHEST 1 VW-     INDICATIONS:  Post-Op Heart Surgery; R94.39-Abnormal result of other cardiovascular  function study; I20.9-Angina pectoris, unspecified;  I25.110-Atherosclerotic heart disease of native coronary artery with  unstable angina pectoris; I10-Essential (primary) hypertension patient's  a 58-year-old male who presents with history of being postop cardiac  surgery, shortness of breath, high blood pressure. Today's postop day 1     COMPARISON:  Study of 12/06/2019     TECHNIQUE:   Single radiographic AP view of the chest was obtained.     FINDINGS:  Today's exam was obtained a portable erect position on 12/07/2019 at  5:54 AM     The ET tube and NG tube have been withdrawn. A right internal jugular  [place]-Prabhakar catheter is present with tip in the main pulmonary artery in  good position. The left-sided chest tube and left-sided mediastinal  drain are again seen bilateral basilar areas of atelectasis are present  which appears stable from yesterday's study. There is no evidence of  pneumothorax. The upper abdomen appears unremarkable. Changes of CABG  are noted.        Impression:          1. Interval removal of ET tube and NG tube  2. Bilateral basilar areas of atelectasis which appears stable  3. [place]-Prabhakar catheter,  mediastinal drain and left chest tube remain in  good position     Electronically Signed By-Delvin Cordova Jr. On:12/7/2019 7:38 AM  This report was finalized on [id number] by  Delvin Cordova Jr., .    XR Chest 1 View [[id number]] Collected:  12/06/19 2159     Updated:  12/06/19 2203    Narrative:       XR CHEST 1 VW-     Date of Exam: 12/6/2019 9:10 PM     Indication: Post-Op Check Line & Tube Placement; R94.39-Abnormal  result of other cardiovascular function study; I20.9-Angina pectoris,  unspecified; I25.110-Atherosclerotic heart disease of native coronary  artery with unstable angina pectoris; I10-Essential (primary)  hypertension  58-year-old, postop day 0 from CABG     Comparison: 12/05/2019     Technique: 1 view(s) of the chest were obtained. 2 images are  submitted.     FINDINGS: There is been interval CABG. Tip of a right IJ pulmonary  artery catheter in the region of the right main pulmonary artery. NG  tube side-port in the distal esophagus with the tip at the GE junction,  this should be advanced. Mediastinal drain and left chest tube. The  position. Tip of an ET tube is in the midline 5 cm above the chino. No  pneumothorax or pleural effusion. There is bibasilar plate atelectasis.  Pulmonary vascularity is normal.       Impression:       Interval CABG with plate atelectasis in each lung base.  The side-port of the NG tube is in the distal esophagus with the tip  near the GE junction. This should be advanced approximately 10 cm. Other  support lines and tubes are in good position.        Electronically Signed By-Rosalio Canales DO. On:12/6/2019 10:01 PM  This report was finalized on [id number] by  Rosalio Canales DO..    US Renal Bilateral [[id number]] Collected:  12/05/19 2331     Updated:  12/06/19 0133    Narrative:       EXAMINATION: Complete retroperitoneal ultrasound examination.    INDICATIONS: Chronic kidney disease    TECHNIQUE: Ultrasound of the kidneys and urinary bladder was  performed.    COMPARISON: None    FINDINGS:    The right kidney demonstrates cortical thinning without hydronephrosis, measuring 10.7 cm. No sonographically detectable mass is identified.    The left kidney also exhibits cortical thinning without hydronephrosis, measuring 11.3 cm. No sonographically detectable mass is identified.    The urinary bladder is collapsed and not imaged.      Impression:         1. No evidence of urinary obstruction.  2. Cortical thinning of the kidneys, compatible with chronic kidney disease.    Electronically signed by:  Jacob Wilson M.D.    12/5/2019 11:32 PM    XR Chest 1 View [[id number]] Collected:  12/05/19 1609     Updated:  12/05/19 1611    Narrative:       DATE OF EXAM:  12/5/2019 3:56 PM     PROCEDURE:  XR CHEST 1 VW-     INDICATIONS:  Cardiac Surgery       COMPARISON:  None     TECHNIQUE:   Single radiographic view of the chest was obtained.     FINDINGS:  No acute airspace disease. Heart size is normal. No pleural effusion or  pneumothorax or acute osseous abnormality.       Impression:       No acute chest findings.     Electronically Signed By-Dr. Sayra Anand MD On:12/5/2019 4:09 PM  This report was finalized on [id number] by Dr. Sayra Anand MD.          Results for orders placed during the hospital encounter of 12/04/19   XR Chest 1 View    Narrative DATE OF EXAM:  12/8/2019 5:51 AM     PROCEDURE:  XR CHEST 1 VW-     INDICATIONS:  Post-Op Heart Surgery; R94.39-Abnormal result of other cardiovascular  function study; I20.9-Angina pectoris, unspecified;  I25.110-Atherosclerotic heart disease of native coronary artery with  unstable angina pectoris; I10-Essential (primary) hypertension patient's  a 58-year-old male status post Opperman heart surgery 3 days ago.  History of smoking and high blood pressure     COMPARISON:  Study of 12/07/2019     TECHNIQUE:   Single radiographic AP view of the chest was obtained.     FINDINGS:  Today's portable erect study was  obtained on 12/08/2019 at 5:50 AM.     The heart is enlarged. Mediastinal drain and left chest tube are present  a right internal jugular sheath is present. The [place]-Prabhakar catheter has  been withdrawn. Bilateral basilar areas of atelectasis are seen which  appears stable from yesterday's study. There has been no interval  worsening.        Impression    1. Interval removal of [place]-Prabhakar catheter  2. Right internal jugular sheath, mediastinal drain and left chest tube  remain in good position  3. Bilateral atelectatic changes are seen which appears stable from  yesterday's study     Electronically Signed By-Delvin Cordova Jr. On:12/8/2019 8:11 AM  This report was finalized on [id number] by  Delvni Cordova Jr., .   XR Chest 1 View    Narrative DATE OF EXAM:  12/7/2019 5:52 AM     PROCEDURE:  XR CHEST 1 VW-     INDICATIONS:  Post-Op Heart Surgery; R94.39-Abnormal result of other cardiovascular  function study; I20.9-Angina pectoris, unspecified;  I25.110-Atherosclerotic heart disease of native coronary artery with  unstable angina pectoris; I10-Essential (primary) hypertension patient's  a 58-year-old male who presents with history of being postop cardiac  surgery, shortness of breath, high blood pressure. Today's postop day 1     COMPARISON:  Study of 12/06/2019     TECHNIQUE:   Single radiographic AP view of the chest was obtained.     FINDINGS:  Today's exam was obtained a portable erect position on 12/07/2019 at  5:54 AM     The ET tube and NG tube have been withdrawn. A right internal jugular  [place]-Prabhakar catheter is present with tip in the main pulmonary artery in  good position. The left-sided chest tube and left-sided mediastinal  drain are again seen bilateral basilar areas of atelectasis are present  which appears stable from yesterday's study. There is no evidence of  pneumothorax. The upper abdomen appears unremarkable. Changes of CABG  are noted.        Impression    1. Interval removal of ET tube and NG  tube  2. Bilateral basilar areas of atelectasis which appears stable  3. [place]-Prabhakar catheter, mediastinal drain and left chest tube remain in  good position     Electronically Signed By-Delvin Cordova Jr. On:12/7/2019 7:38 AM  This report was finalized on [id number] by  Delvin Cordova Jr., .   XR Chest 1 View    Narrative XR CHEST 1 VW-     Date of Exam: 12/6/2019 9:10 PM     Indication: Post-Op Check Line & Tube Placement; R94.39-Abnormal  result of other cardiovascular function study; I20.9-Angina pectoris,  unspecified; I25.110-Atherosclerotic heart disease of native coronary  artery with unstable angina pectoris; I10-Essential (primary)  hypertension  58-year-old, postop day 0 from CABG     Comparison: 12/05/2019     Technique: 1 view(s) of the chest were obtained. 2 images are  submitted.     FINDINGS: There is been interval CABG. Tip of a right IJ pulmonary  artery catheter in the region of the right main pulmonary artery. NG  tube side-port in the distal esophagus with the tip at the GE junction,  this should be advanced. Mediastinal drain and left chest tube. The  position. Tip of an ET tube is in the midline 5 cm above the chino. No  pneumothorax or pleural effusion. There is bibasilar plate atelectasis.  Pulmonary vascularity is normal.       Impression Interval CABG with plate atelectasis in each lung base.  The side-port of the NG tube is in the distal esophagus with the tip  near the GE junction. This should be advanced approximately 10 cm. Other  support lines and tubes are in good position.        Electronically Signed By-Rosalio Canales DO. On:12/6/2019 10:01 PM  This report was finalized on [id number] by  Rosalio Canales DO..       Results for orders placed during the hospital encounter of 12/04/19   Duplex Vein Mapping Lower Extremity - Bilateral CAR    Narrative · The right greater saphenous vein is patent and of adequate size in the   thigh.  · The left greater saphenous vein is patent and of  adequate size in the   thigh.  · The left greater saphenous vein is patent and of adequate size in the   calf.  · The right greater saphenous vein is patent and of adequate size in the   calf.            ASSESSMENT / PLAN      Abnormal nuclear stress test    Angina pectoris (CMS/HCC)    Coronary artery disease involving native coronary artery of native heart with unstable angina pectoris (CMS/HCC)    Essential hypertension    Coronary artery disease    1. ARF/ZAIN/CRF/CKD STG 2------Nonoliguric. ARF/ZAIN resolved. PRBCs to help renal perfusion. +ARF/ZAIN on top of what appears to be CRF/CKD STG 2, with a baseline serum creatinine of 1.1. Unknown if patient has baseline proteinuria or hematuria. CRF/CKD STG 2 is likely secondary to HTN NS. +ARF/ZAIN is already better and likely was secondary to slight prerenal state with concomitant ACE-I use. RENAL US showed cortical thinning compatible with chronic medical kidney disease.  Keep off of ACE-I for now.   No NSAIDs or further IV dye.  D/C holman      2. HTN WITH CKD-------Avoid hypotension. BP okay. No ACE/ARB/DRI/diuretic for now     3. CAD S/P CATH S/P CABG-----per Cardiology and CT surgery     4. HYPERLIPIDEMIA------On Statin. TSH 19.08. CK ok. Synthroid     5. OA/DJD------No NSAIDs.       6. HYPOTHYROIDISM---Synthroid    7. ANEMIA-----H/H up s/p PRBCs.    8. HYPERNATREMIA-------Resolved. D/C IVFs    9. HYPOPHOSPHATEMIA------Replace with KPhos             Luis Goff MD  [facility]  [phone]  12/08/19  8:14 AM

## 2019-12-08 NOTE — PROGRESS NOTES
S/P POD# 2 CABG--Tamar  EF 58% (stress test)    Subjective:  Patient up to chair. Complains burping is hurting his chest.    Drips: Insulin  CXR-- atel    Intake/Output Summary (Last 24 hours) at 12/8/2019 0950  Last data filed at 12/8/2019 0600  Gross per 24 hour   Intake 1219 ml   Output 990 ml   Net 229 ml     Temp:  [97.8 °F (36.6 °C)-98.8 °F (37.1 °C)] 98.4 °F (36.9 °C)  Heart Rate:  [90] 90  Resp:  [16] 16  BP: ()/(55-78) 95/55  Arterial Line BP: (107-127)/(52-68) 111/56  /8    Results from last 7 days   Lab Units 12/08/19  0519 12/07/19  0255  12/06/19  2045 12/06/19  1929  12/06/19  1737   WBC 10*3/mm3 8.30 7.30  --  6.30 7.80  --  6.30   HEMOGLOBIN g/dL 8.3* 7.1*  --  8.8* 9.8*  --  8.4*   HEMOGLOBIN, POC   --   --    < >  --   --    < >  --    HEMATOCRIT % 23.1* 19.3*  --  23.8* 26.4*  --  23.2*   HEMATOCRIT POC   --   --    < >  --   --    < >  --    PLATELETS 10*3/mm3 152 190  --  172 123*  --  117*  117*   INR   --   --   --  1.30* 1.22*  --  1.33*    < > = values in this interval not displayed.     Results from last 7 days   Lab Units 12/08/19  0519   CREATININE mg/dL 0.94   POTASSIUM mmol/L 3.9   SODIUM mmol/L 136   MAGNESIUM mg/dL 2.4   PHOSPHORUS mg/dL 2.4*       Physical Exam:  Neuro intact, nad,   Tele:  SR 70's  Diminished bases, 96% 4L NC  dsg c/d/i, No drainage  Benign abd, No  BM  No edema    Assessment/Plan:  Active Problems:    Abnormal nuclear stress test    Angina pectoris (CMS/HCC)    Coronary artery disease involving native coronary artery of native heart with unstable angina pectoris (CMS/HCC)    Essential hypertension    Coronary artery disease    MV CAD, s/p stenting, EF 58% (stress test)--aspirin, statin, add BB  Expected postop SAL-- 1 PRBC 12/7  HTN--stable  Dyslipidemia--statin    Routine care  De-escalate  Mobilize  DC central line and holman  Add low dose BB    GULSHAN MELTON, KIRSTEN  12/8/2019  9:50 AM

## 2019-12-09 ENCOUNTER — APPOINTMENT (OUTPATIENT)
Dept: GENERAL RADIOLOGY | Facility: HOSPITAL | Age: 59
End: 2019-12-09

## 2019-12-09 LAB
ALBUMIN SERPL-MCNC: 4.1 G/DL (ref 3.5–5.2)
ALBUMIN/GLOB SERPL: 1.7 G/DL
ALP SERPL-CCNC: 54 U/L (ref 39–117)
ALT SERPL W P-5'-P-CCNC: 8 U/L (ref 1–41)
ANION GAP SERPL CALCULATED.3IONS-SCNC: 9 MMOL/L (ref 5–15)
AST SERPL-CCNC: 21 U/L (ref 1–40)
BILIRUB SERPL-MCNC: 0.7 MG/DL (ref 0.2–1.2)
BUN BLD-MCNC: 17 MG/DL (ref 6–20)
BUN/CREAT SERPL: 17.5 (ref 7–25)
CA-I SERPL ISE-MCNC: 1.17 MMOL/L (ref 1.2–1.3)
CALCIUM SPEC-SCNC: 8.8 MG/DL (ref 8.6–10.5)
CHLORIDE SERPL-SCNC: 100 MMOL/L (ref 98–107)
CO2 SERPL-SCNC: 29 MMOL/L (ref 22–29)
CREAT BLD-MCNC: 0.97 MG/DL (ref 0.76–1.27)
DEPRECATED RDW RBC AUTO: 47.7 FL (ref 37–54)
ERYTHROCYTE [DISTWIDTH] IN BLOOD BY AUTOMATED COUNT: 14.3 % (ref 12.3–15.4)
GFR SERPL CREATININE-BSD FRML MDRD: 80 ML/MIN/1.73
GLOBULIN UR ELPH-MCNC: 2.4 GM/DL
GLUCOSE BLD-MCNC: 107 MG/DL (ref 65–99)
GLUCOSE BLDC GLUCOMTR-MCNC: 110 MG/DL (ref 70–105)
GLUCOSE BLDC GLUCOMTR-MCNC: 118 MG/DL (ref 70–105)
GLUCOSE BLDC GLUCOMTR-MCNC: 121 MG/DL (ref 70–105)
GLUCOSE BLDC GLUCOMTR-MCNC: 78 MG/DL (ref 70–105)
GLUCOSE BLDC GLUCOMTR-MCNC: 96 MG/DL (ref 70–105)
HCT VFR BLD AUTO: 23.9 % (ref 37.5–51)
HGB BLD-MCNC: 8.5 G/DL (ref 13–17.7)
MAGNESIUM SERPL-MCNC: 2.3 MG/DL (ref 1.6–2.6)
MCH RBC QN AUTO: 33.7 PG (ref 26.6–33)
MCHC RBC AUTO-ENTMCNC: 35.7 G/DL (ref 31.5–35.7)
MCV RBC AUTO: 94.4 FL (ref 79–97)
PHOSPHATE SERPL-MCNC: 2.6 MG/DL (ref 2.5–4.5)
PLATELET # BLD AUTO: 154 10*3/MM3 (ref 140–450)
PMV BLD AUTO: 8.2 FL (ref 6–12)
POTASSIUM BLD-SCNC: 4.3 MMOL/L (ref 3.5–5.2)
PROT SERPL-MCNC: 6.5 G/DL (ref 6–8.5)
RBC # BLD AUTO: 2.53 10*6/MM3 (ref 4.14–5.8)
SODIUM BLD-SCNC: 138 MMOL/L (ref 136–145)
WBC NRBC COR # BLD: 6.5 10*3/MM3 (ref 3.4–10.8)

## 2019-12-09 PROCEDURE — 84100 ASSAY OF PHOSPHORUS: CPT | Performed by: INTERNAL MEDICINE

## 2019-12-09 PROCEDURE — 71045 X-RAY EXAM CHEST 1 VIEW: CPT

## 2019-12-09 PROCEDURE — 94799 UNLISTED PULMONARY SVC/PX: CPT

## 2019-12-09 PROCEDURE — 82330 ASSAY OF CALCIUM: CPT | Performed by: INTERNAL MEDICINE

## 2019-12-09 PROCEDURE — 85027 COMPLETE CBC AUTOMATED: CPT | Performed by: NURSE PRACTITIONER

## 2019-12-09 PROCEDURE — 83735 ASSAY OF MAGNESIUM: CPT | Performed by: INTERNAL MEDICINE

## 2019-12-09 PROCEDURE — 82962 GLUCOSE BLOOD TEST: CPT

## 2019-12-09 PROCEDURE — 97530 THERAPEUTIC ACTIVITIES: CPT

## 2019-12-09 PROCEDURE — 25010000002 METOCLOPRAMIDE PER 10 MG: Performed by: NURSE PRACTITIONER

## 2019-12-09 PROCEDURE — 99232 SBSQ HOSP IP/OBS MODERATE 35: CPT | Performed by: INTERNAL MEDICINE

## 2019-12-09 PROCEDURE — 25010000002 ENOXAPARIN PER 10 MG: Performed by: INTERNAL MEDICINE

## 2019-12-09 PROCEDURE — 97535 SELF CARE MNGMENT TRAINING: CPT

## 2019-12-09 PROCEDURE — 80053 COMPREHEN METABOLIC PANEL: CPT | Performed by: INTERNAL MEDICINE

## 2019-12-09 RX ORDER — GUAIFENESIN 600 MG/1
600 TABLET, EXTENDED RELEASE ORAL EVERY 12 HOURS SCHEDULED
Status: DISCONTINUED | OUTPATIENT
Start: 2019-12-09 | End: 2019-12-10 | Stop reason: HOSPADM

## 2019-12-09 RX ADMIN — GUAIFENESIN 600 MG: 600 TABLET, EXTENDED RELEASE ORAL at 12:30

## 2019-12-09 RX ADMIN — ENOXAPARIN SODIUM 40 MG: 40 INJECTION SUBCUTANEOUS at 20:25

## 2019-12-09 RX ADMIN — POLYETHYLENE GLYCOL 3350 17 G: 17 POWDER, FOR SOLUTION ORAL at 20:20

## 2019-12-09 RX ADMIN — SENNOSIDES 1 TABLET: 8.6 TABLET, FILM COATED ORAL at 20:24

## 2019-12-09 RX ADMIN — METOPROLOL TARTRATE 12.5 MG: 25 TABLET ORAL at 20:23

## 2019-12-09 RX ADMIN — PANTOPRAZOLE SODIUM 40 MG: 40 TABLET, DELAYED RELEASE ORAL at 06:02

## 2019-12-09 RX ADMIN — ASPIRIN 325 MG: 325 TABLET, DELAYED RELEASE ORAL at 08:37

## 2019-12-09 RX ADMIN — ACETAMINOPHEN 650 MG: 325 TABLET ORAL at 20:22

## 2019-12-09 RX ADMIN — DOCUSATE SODIUM 100 MG: 100 CAPSULE, LIQUID FILLED ORAL at 20:23

## 2019-12-09 RX ADMIN — METOCLOPRAMIDE 10 MG: 5 INJECTION, SOLUTION INTRAMUSCULAR; INTRAVENOUS at 06:02

## 2019-12-09 RX ADMIN — ACETAMINOPHEN 650 MG: 325 TABLET ORAL at 14:23

## 2019-12-09 RX ADMIN — SENNOSIDES 1 TABLET: 8.6 TABLET, FILM COATED ORAL at 08:37

## 2019-12-09 RX ADMIN — MUPIROCIN 1 APPLICATION: 20 OINTMENT TOPICAL at 20:30

## 2019-12-09 RX ADMIN — GUAIFENESIN 600 MG: 600 TABLET, EXTENDED RELEASE ORAL at 20:22

## 2019-12-09 RX ADMIN — POLYETHYLENE GLYCOL 3350 17 G: 17 POWDER, FOR SOLUTION ORAL at 08:37

## 2019-12-09 RX ADMIN — MUPIROCIN 1 APPLICATION: 20 OINTMENT TOPICAL at 08:38

## 2019-12-09 RX ADMIN — DOCUSATE SODIUM 100 MG: 100 CAPSULE, LIQUID FILLED ORAL at 08:37

## 2019-12-09 RX ADMIN — METOPROLOL TARTRATE 12.5 MG: 25 TABLET ORAL at 08:37

## 2019-12-09 RX ADMIN — HYDROCODONE BITARTRATE AND ACETAMINOPHEN 1 TABLET: 5; 325 TABLET ORAL at 06:02

## 2019-12-09 RX ADMIN — CHLORHEXIDINE GLUCONATE 0.12% ORAL RINSE 15 ML: 1.2 LIQUID ORAL at 20:20

## 2019-12-09 RX ADMIN — LEVOTHYROXINE SODIUM 75 MCG: 75 TABLET ORAL at 06:02

## 2019-12-09 RX ADMIN — ATORVASTATIN CALCIUM 40 MG: 40 TABLET, FILM COATED ORAL at 20:23

## 2019-12-09 NOTE — THERAPY TREATMENT NOTE
Acute Care - Occupational Therapy Progress Note  [facility]     Patient Name: Duke Freire  : 1960  MRN: [id number]  Today's Date: 2019             Admit Date: 2019       ICD-10-CM ICD-9-CM   1. Abnormal nuclear stress test R94.39 794.39   2. Angina pectoris (CMS/HCC) I20.9 413.9   3. Coronary artery disease involving native coronary artery of native heart with unstable angina pectoris (CMS/HCC) I25.110 414.01     411.1   4. Essential hypertension I10 401.9     Patient Active Problem List   Diagnosis   • Abnormal nuclear stress test   • Angina pectoris (CMS/HCC)   • Coronary artery disease involving native coronary artery of native heart with unstable angina pectoris (CMS/HCC)   • Essential hypertension   • Coronary artery disease     Past Medical History:   Diagnosis Date   • Coronary artery disease      Past Surgical History:   Procedure Laterality Date   • CARDIAC CATHETERIZATION Left 2019    Procedure: Left Heart Cath;  Surgeon: James Hernandez MD;  Location: [facility] CATH INVASIVE LOCATION;  Service: Cardiovascular   • CARDIAC CATHETERIZATION N/A 2019    Procedure: Coronary angiography;  Surgeon: James Hernandez MD;  Location: [facility] CATH INVASIVE LOCATION;  Service: Cardiovascular   • CORONARY ANGIOPLASTY WITH STENT PLACEMENT     • INNER EAR SURGERY  1985       Therapy Treatment    Rehabilitation Treatment Summary     Row Name 19 1500             Treatment Time/Intention    Discipline  occupational therapist  -ES      Document Type  therapy note (daily note)  -ES      Patient Effort  excellent  -ES      Comment  Pt states eager to d/c home. Pleasant, cooperative.   -ES      Recorded by [ES] Omaira Holloway OT 19 162      Row Name 19 1500             Cognitive Assessment/Intervention- PT/OT    Orientation Status (Cognition)  oriented x 4  -ES      Recorded by [ES] Omaira Holloway OT 19 162      Row Name 19 1500              Safety Issues, Functional Mobility    Impairments Affecting Function (Mobility)  endurance/activity tolerance;cognition;pain;strength  -ES      Recorded by [ES] Omaira Holloway, OT 12/09/19 1629      Row Name 12/09/19 1500             Transfer Assessment/Treatment    Transfer Assessment/Treatment  sit-stand transfer;bed-chair transfer;shower transfer  -ES      Recorded by [ES] Omaira Holloway, OT 12/09/19 1629      Row Name 12/09/19 1500             Bed-Chair Transfer    Bed-Chair [place] (Transfers)  supervision  -ES      Recorded by [ES] Omaira Holloway, OT 12/09/19 1629      Row Name 12/09/19 1500             Sit-Stand Transfer    Sit-Stand [place] (Transfers)  supervision  -ES      Recorded by [ES] Omaira Holloway, OT 12/09/19 1629      Row Name 12/09/19 1500             Shower Transfer    Type (Shower Transfer)  lateral  -ES      [place] Level (Shower Transfer)  contact guard  -ES      Recorded by [ES] Omaira Holloway, OT 12/09/19 1629      Row Name 12/09/19 1500             ADL Assessment/Intervention    BADL Assessment/Intervention  lower body dressing;bathing;upper body dressing;toileting  -ES      Recorded by [ES] Omaira Holloway, OT 12/09/19 1629      Row Name 12/09/19 1500             Bathing Assessment/Intervention    Bathing [place] Level  set up  -ES      Recorded by [ES] Omaira Holloway, OT 12/09/19 1629      Row Name 12/09/19 1500             Upper Body Dressing Assessment/Training    Upper Body Dressing [place] Level  set up  -ES      Recorded by [ES] Omaira Holloway, OT 12/09/19 1629      Row Name 12/09/19 1500             Lower Body Dressing Assessment/Training    Lower Body Dressing [place] Level  set up  -ES      Recorded by [ES] Omaira Holloway, OT 12/09/19 1629      Row Name 12/09/19 1500             Self-Feeding Assessment/Training    [place] Level (Feeding)  set up  -ES      Recorded by [ES] Omaira Holloway, OT 12/09/19  1629      Row Name 12/09/19 1500             Toileting Assessment/Training    [place] Level (Toileting)  set up  -ES      Recorded by [ES] Omaira Holloway, OT 12/09/19 1629      Row Name 12/09/19 1500             Motor Skills Assessment/Interventions    Additional Documentation  Balance (Group)  -ES      Recorded by [ES] Omaira Holloway, OT 12/09/19 1629      Row Name 12/09/19 1500             Balance    Balance  static standing balance;static sitting balance;dynamic sitting balance;dynamic standing balance  -ES      Recorded by [ES] Omaira Holloway, OT 12/09/19 1629      Row Name 12/09/19 1500             Static Sitting Balance    Level of [place] (Unsupported Sitting, Static Balance)  conditional independence  -ES      Recorded by [ES] Omaira Holloway, OT 12/09/19 1629      Row Name 12/09/19 1500             Dynamic Sitting Balance    Level of [place], Reaches Outside Midline (Sitting, Dynamic Balance)  conditional independence  -ES      Recorded by [ES] Omaira Holloway, OT 12/09/19 1629      Row Name 12/09/19 1500             Static Standing Balance    Level of [place] (Supported Standing, Static Balance)  supervision  -ES      Recorded by [ES] Omaira Holloway, OT 12/09/19 1629      Row Name 12/09/19 1500             Dynamic Standing Balance    Level of [place], Reaches Outside Midline (Standing, Dynamic Balance)  contact guard assist  -ES      Recorded by [ES] Omaira Holloway, OT 12/09/19 1629      Row Name 12/09/19 1500             Positioning and Restraints    Pre-Treatment Position  sitting in chair/recliner  -ES      Post Treatment Position  bathroom  -ES      In Chair  notified nsg;call light within reach;encouraged to call for assist  -ES      Recorded by [ES] Omaira Holloway, OT 12/09/19 1629      Row Name 12/09/19 1500             Pain Scale: Numbers Pre/Post-Treatment    Pain Scale: Numbers, Pretreatment  0/10 - no pain  -ES      Pain Scale: Numbers,  Post-Treatment  0/10 - no pain  -ES      Pain Location  chest  -ES      Recorded by [ES] Omaira Holloway, OT 12/09/19 1629      Row Name 12/09/19 1500             Health Promotion    Additional Documentation  Coping (Group)  -ES      Recorded by [ES] Omaira Holloway, OT 12/09/19 1629      Row Name                Wound 12/06/19 sternal Incision    Wound - Properties Group Date first assessed: 12/06/19 [MW] Present on Hospital Admission: N [MW] Location: sternal [MW] Primary Wound Type: Incision [MW] Recorded by:  [MW] Jason Moya, RN 12/06/19 1530    Row Name                Wound 12/06/19 Left leg Incision    Wound - Properties Group Date first assessed: 12/06/19 [MW] Side: Left [MW] Location: leg [MW] Primary Wound Type: Incision [MW] Recorded by:  [MW] Jason Moya, RN 12/06/19 1531    Row Name                Wound 12/06/19 1534 Other (See comments) torso Incision    Wound - Properties Group Date first assessed: 12/06/19 [MW] Time first assessed: 1534 [MW] Side: Other (See comments) [MW] Location: torso [MW] Primary Wound Type: Incision [MW] Recorded by:  [MW] Jason Moya, RN 12/06/19 1534    Row Name 12/09/19 1500             Coping    Observed Emotional State  accepting;calm  -ES      Verbalized Emotional State  acceptance  -ES      Recorded by [ES] Omaira Holloway, OT 12/09/19 1629      Row Name 12/09/19 1500             Plan of Care Review    Plan of Care Reviewed With  patient  -ES      Progress  improving  -ES      Recorded by [ES] Omaira Holloway, OT 12/09/19 1629      Row Name 12/09/19 1500             Outcome Summary/Treatment Plan (OT)    Anticipated Discharge Disposition (OT)  home with assist  -ES      Recorded by [ES] Omaira Holloway, OT 12/09/19 1629        User Key  (r) = Recorded By, (t) = Taken By, (c) = Cosigned By    Initials Name Effective Dates Discipline    Jason Rice, RN 12/05/16 -  Nurse    Omaira Bautista OT 03/01/19 -  OT        Wound 12/06/19  sternal Incision (Active)   Dressing Appearance open to air 12/9/2019  4:00 PM   Closure Liquid skin adhesive;Approximated 12/9/2019  4:00 PM   Base dressing in place, unable to visualize 12/9/2019  4:00 AM   Drainage Amount none 12/9/2019  4:00 PM   Care, Wound cleansed with 12/9/2019  8:00 AM   Dressing Care, Wound silicone 12/9/2019  8:00 AM       Wound 12/06/19 Left leg Incision (Active)   Dressing Appearance open to air 12/9/2019  4:00 PM   Closure Approximated;Liquid skin adhesive;Open to air 12/9/2019  4:00 PM   Base clean;dry 12/9/2019  4:00 AM   Drainage Amount none 12/9/2019  4:00 PM   Care, Wound cleansed with 12/9/2019  8:00 AM       Wound 12/06/19 1534 Other (See comments) torso Incision (Active)   Dressing Appearance intact;dry 12/9/2019  4:00 PM   Closure Liquid skin adhesive;Approximated 12/9/2019  4:00 PM   Periwound dry;intact 12/9/2019  4:00 AM   Drainage Amount small 12/9/2019  4:00 PM   Dressing Care, Wound border dressing 12/9/2019  4:00 PM           OT Recommendation and Plan  Outcome Summary/Treatment Plan (OT)  Anticipated Discharge Disposition (OT): home with assist  Plan of Care Review  Plan of Care Reviewed With: patient  Plan of Care Reviewed With: patient  Outcome Summary: Patient continues to do well, requiring 2L O2 with appropriate saturations this date. Pt requiring supervision for safety with ADLs including bathing this date. Pt appears safe to d/c home, but limited by living alone with 11-year old daughter intermittently at home. Patient demos good safety awareness, will continue to progress activity tolerance and attempt to wean O2.       Time Calculation:   Time Calculation- OT     Row Name 12/09/19 1632             Time Calculation- OT    OT Start Time  1442  -ES      OT Stop Time  1512  -ES      OT Time Calculation (min)  30 min  -ES      Total Timed Code Minutes- OT  30 minute(s)  -ES      OT Received On  12/09/19  -ES      OT - Next Appointment  12/10/19  -ES        User Key   (r) = Recorded By, (t) = Taken By, (c) = Cosigned By    Initials Name Provider Type     Omaira Holloway OT Occupational Therapist        Therapy Charges for Today     Code Description Service Date Service Provider Modifiers Qty    95358490393  OT SELF CARE/MGMT/TRAIN EA 15 MIN 12/9/2019 Omaira Holloway OT GO 1    [id number]  OT THERAPEUTIC ACT EA 15 MIN 12/9/2019 Omaira Holloway OT GO 1               Omaira Holloway OT  12/9/2019

## 2019-12-09 NOTE — CONSULTS
Pt seen, given Cardiac Rehab brochure and post op exercise brochure and instructed on exercises. Instructed on at home walking program. Also given handouts on HTN, stress, HHD, cholesterol, triglycerides, and post op activity timeline guide.

## 2019-12-09 NOTE — PLAN OF CARE
Problem: Patient Care Overview  Goal: Plan of Care Review  Outcome: Ongoing (interventions implemented as appropriate)  Flowsheets (Taken 12/9/2019 1631)  Outcome Summary: Patient continues to do well, requiring 2L O2 with appropriate saturations this date. Pt requiring supervision for safety with ADLs including bathing this date. Pt appears safe to d/c home, but limited by living alone with 11-year old daughter intermittently at home. Patient demos good safety awareness, will continue to progress activity tolerance and attempt to wean O2.

## 2019-12-09 NOTE — PROGRESS NOTES
"    Reason for follow-up: Severe LAD disease  Unstable angina     Patient Care Team:  Sandra Cha PA as PCP - General  James Hernandez MD as Consulting Physician (Cardiology)  Marilyn Goff MD as Consulting Physician (Nephrology)    Subjective .   Feels much better     Review of Systems   Constitution: Positive for malaise/fatigue. Negative for fever.   HENT: Negative for congestion and hearing loss.    Eyes: Negative for double vision and visual disturbance.   Cardiovascular: Positive for chest pain. Negative for claudication, dyspnea on exertion, leg swelling and syncope.   Respiratory: Negative for cough and shortness of breath.    Endocrine: Negative for cold intolerance.   Skin: Negative for color change and rash.   Musculoskeletal: Negative for arthritis and joint pain.   Gastrointestinal: Negative for abdominal pain and heartburn.   Genitourinary: Negative for hematuria.   Neurological: Positive for headaches. Negative for excessive daytime sleepiness and dizziness.   Psychiatric/Behavioral: Negative for depression. The patient is not nervous/anxious.    All other systems reviewed and are negative.      Patient has no known allergies.    Scheduled Meds:    Continuous Infusions:    No current facility-administered medications for this encounter.   PRN Meds:.    Objective   Looks comfortable lying in the bed    VITAL SIGNS  Vitals:    12/10/19 0845 12/10/19 0847 12/10/19 1145 12/10/19 1200   BP: 131/78 131/78 104/60    BP Location: Left arm  Left arm    Patient Position: Sitting  Sitting    Pulse: 85 83 82    Resp: 14  16    Temp:    98 °F (36.7 °C)   TempSrc:   Oral    SpO2: 100%  98%    Weight:       Height:           Flowsheet Rows      First Filed Value   Admission Height  177.8 cm (70\") Documented at 12/04/2019 1632   Admission Weight  87.9 kg (193 lb 12.6 oz) Documented at 12/04/2019 1632           TELEMETRY: Sinus rhythm    Physical Exam:  Physical Exam   Constitutional: He " is oriented to person, place, and time. He appears well-developed and well-nourished. He is cooperative.   HENT:   Head: Normocephalic and atraumatic.   Mouth/Throat: Uvula is midline and oropharynx is clear and moist. No oral lesions.   Eyes: Conjunctivae are normal. No scleral icterus.   Neck: Trachea normal. Neck supple. No JVD present. Carotid bruit is not present. No thyromegaly present.   Cardiovascular: Normal rate, regular rhythm, S1 normal, S2 normal, normal heart sounds, intact distal pulses and normal pulses. PMI is not displaced. Exam reveals no gallop and no friction rub.   No murmur heard.  Pulmonary/Chest: Effort normal and breath sounds normal.   Chest tube present   Abdominal: Soft. Bowel sounds are normal.   Genitourinary:   Genitourinary Comments: Husain catheter present   Musculoskeletal: Normal range of motion.   Neurological: He is alert and oriented to person, place, and time. He has normal strength.   No focal deficits   Skin: Skin is warm. No cyanosis.   Right groin no hematoma   Psychiatric: He has a normal mood and affect.                LAB RESULTS (LAST 7 DAYS)    CBC  Results from last 7 days   Lab Units 12/10/19  0355 12/09/19  0553 12/08/19  0519 12/07/19  0255 12/07/19  0229 12/06/19  2322 12/06/19  2202  12/06/19  2045 12/06/19  1929  12/06/19  1737   WBC 10*3/mm3 6.00 6.50 8.30 7.30  --   --   --   --  6.30 7.80  --  6.30   RBC 10*6/mm3 2.40* 2.53* 2.51* 2.12*  --   --   --   --  2.60* 2.89*  --  2.48*   HEMOGLOBIN g/dL 8.1* 8.5* 8.3* 7.1*  --   --   --   --  8.8* 9.8*  --  8.4*   HEMOGLOBIN, POC g/dL  --   --   --   --  5.9* 7.6* 8.5*   < >  --   --    < >  --    HEMATOCRIT % 22.9* 23.9* 23.1* 19.3*  --   --   --   --  23.8* 26.4*  --  23.2*   HEMATOCRIT POC %  --   --   --   --  17* 22* 25*   < >  --   --    < >  --    MCV fL 95.4 94.4 91.7 91.2  --   --   --   --  91.6 91.1  --  93.4   PLATELETS 10*3/mm3 168 154 152 190  --   --   --   --  172 123*  --  117*  117*    < > =  values in this interval not displayed.       BMP  Results from last 7 days   Lab Units 12/10/19  0355 12/09/19  0553 12/08/19  0519 12/07/19  1157 12/07/19  0255 12/06/19 2045 12/06/19  0550 12/05/19  0557   SODIUM mmol/L 137 138 136  --  147* 140 138 140   POTASSIUM mmol/L 3.9 4.3 3.9 4.3 3.6 3.9 4.2 4.6   CHLORIDE mmol/L 99 100 100  --  108* 107 101 106   CO2 mmol/L 28.0 29.0 26.0  --  27.0 23.0 28.0 25.0   BUN mg/dL 18 17 18  --  18 15 15 22*   CREATININE mg/dL 0.86 0.97 0.94  --  1.37* 1.14 1.13 1.10   GLUCOSE mg/dL 104* 107* 125*  --  137* 134* 93 94   MAGNESIUM mg/dL 2.2 2.3 2.4  --  2.8*  --  2.1  --    PHOSPHORUS mg/dL 2.2* 2.6 2.4*  --  1.2* 3.3 3.2  --        CMP   Results from last 7 days   Lab Units 12/10/19  0355 12/09/19  0553 12/08/19  0519 12/07/19  1157 12/07/19  0255 12/06/19 2045 12/06/19  0550 12/05/19  0557   SODIUM mmol/L 137 138 136  --  147* 140 138 140   POTASSIUM mmol/L 3.9 4.3 3.9 4.3 3.6 3.9 4.2 4.6   CHLORIDE mmol/L 99 100 100  --  108* 107 101 106   CO2 mmol/L 28.0 29.0 26.0  --  27.0 23.0 28.0 25.0   BUN mg/dL 18 17 18  --  18 15 15 22*   CREATININE mg/dL 0.86 0.97 0.94  --  1.37* 1.14 1.13 1.10   GLUCOSE mg/dL 104* 107* 125*  --  137* 134* 93 94   ALBUMIN g/dL 3.70 4.10 4.40  --  4.60 4.10 4.00 3.90   BILIRUBIN mg/dL 0.7 0.7 0.8  --   --  0.7 0.4 0.5   ALK PHOS U/L 58 54 46  --   --  48 92 91   AST (SGOT) U/L 17 21 27  --   --  24 20 17   ALT (SGPT) U/L 9 8 9  --   --  15 25 21         BNP        TROPONIN  Results from last 7 days   Lab Units 12/08/19  0519   CK TOTAL U/L 647*       CoAg  Results from last 7 days   Lab Units 12/06/19  2045 12/06/19  1929 12/06/19  1737 12/05/19  1507 12/04/19  1533   INR  1.30* 1.22* 1.33* 1.06 1.10   APTT seconds 27.3 29.0 27.8 30.1  --        Creatinine Clearance  Estimated Creatinine Clearance: 107.2 mL/min (by C-G formula based on SCr of 0.86 mg/dL).    ABG  Results from last 7 days   Lab Units 12/07/19  0229 12/06/19  2322 12/06/19  2202  12/06/19  2135 12/06/19  2100 12/06/19  1905 12/06/19  1715   PH, ARTERIAL pH units 7.385 7.390 7.254* 7.275* 7.392 7.409 7.430   PCO2, ARTERIAL mm Hg 48.1* 46.5 57.7* 56.1* 42.1 40.0 41.0   PO2 ART mm Hg 98.3 133.9* 93.6 112.6* 322.6* 263.0* 336.0*   O2 SATURATION ART % 97.4 99.0* 95.7 97.6 99.9* 100.0* 100.0*   BASE EXCESS ART mmol/L 3.4* 2.8 -1.9* -1.0* 0.6 1.0 3.0       Radiology  Xr Chest 1 View    Result Date: 12/9/2019   1. Mild bilateral perihilar and bibasilar atelectasis, similar to yesterday's study. 2. Interval removal of mediastinal drain, right IJ introducer sheath and left chest tube. No visible pneumothorax.  Electronically Signed By-Dr. Sayra Anand MD On:12/9/2019 1:06 PM This report was finalized on [id number] by Dr. Sayra Anand MD.            EKG    I personally viewed and interpreted the patient's EKG/Telemetry data:    ECHOCARDIOGRAM:       STRESS MYOVIEW:    CARDIAC CATHETERIZATION:    OTHER:         Assessment/Plan       Abnormal nuclear stress test    Angina pectoris (CMS/HCC)    Coronary artery disease involving native coronary artery of native heart with unstable angina pectoris (CMS/HCC)    Essential hypertension    Coronary artery disease    S/P CABG x 2      Patient underwent cardiac cath which showed severe LAD and diagonal artery disease  Films reviewed with interventional cardiology Dr. Bridges and Dr. Hooper    It was decided patient probably would get benefit from two-vessel CABG so patient is now admitted to the CV surgery for two-vessel CABG  We will hold the Plavix  Patient underwent two-vessel CABG 12/6/2019  Currently extubated  Lying in bed  Is complaining of some chest wall pain  Is being paced underlying rhythm is bradycardia  Off Levophed  On insulin drip  We will monitor rhythm  Potassium was low was repleted and is come up to 3.9 we will continue to monitor  BUN/creatinine are 18/ 0.94 we will monitor  Patient's hemoglobin was low at 5.9 was transfused has come up  to 8.3 will continue to monitor  Symptomatic therapy for headaches  Discussed with RN taking care of patient to coordinate care  Discussed with family by bedside    I discussed the patients findings and my recommendations with patient and Attending nurse    James Hernandez MD  12/10/19  9:24 PM

## 2019-12-09 NOTE — PROGRESS NOTES
"S/P POD# 3 CABG--Tamar  EF 58% (stress test)    Subjective:  He has c/o's of productive cough today--reports it as \"thick\"    No events over weekend  Drips: Insulin--transition to Lantus      Intake/Output Summary (Last 24 hours) at 12/9/2019 1016  Last data filed at 12/9/2019 0836  Gross per 24 hour   Intake 2447 ml   Output 800 ml   Net 1647 ml     Temp:  [98.3 °F (36.8 °C)-98.9 °F (37.2 °C)] 98.4 °F (36.9 °C)  Heart Rate:  [60-82] 82  Resp:  [16] 16  BP: ()/(46-79) 119/79  CT 70/8--no airleak    Results from last 7 days   Lab Units 12/09/19  0553 12/08/19  0519  12/06/19  2045 12/06/19  1929  12/06/19  1737   WBC 10*3/mm3 6.50 8.30   < > 6.30 7.80  --  6.30   HEMOGLOBIN g/dL 8.5* 8.3*   < > 8.8* 9.8*  --  8.4*   HEMOGLOBIN, POC   --   --    < >  --   --    < >  --    HEMATOCRIT % 23.9* 23.1*   < > 23.8* 26.4*  --  23.2*   HEMATOCRIT POC   --   --    < >  --   --    < >  --    PLATELETS 10*3/mm3 154 152   < > 172 123*  --  117*  117*   INR   --   --   --  1.30* 1.22*  --  1.33*    < > = values in this interval not displayed.     Results from last 7 days   Lab Units 12/09/19  0553   CREATININE mg/dL 0.97   POTASSIUM mmol/L 4.3   SODIUM mmol/L 138   MAGNESIUM mg/dL 2.3   PHOSPHORUS mg/dL 2.6       Physical Exam:  Neuro intact, nad, up in chair  Tele:  SR 80's  Diminished bases, 96% 2L NC  dsg c/d/i, No drainage  Benign abd, No BM, +flauts  No edema    Assessment/Plan:  Active Problems:    Abnormal nuclear stress test    Angina pectoris (CMS/HCC)    Coronary artery disease involving native coronary artery of native heart with unstable angina pectoris (CMS/HCC)    Essential hypertension    Coronary artery disease    MV CAD, s/p stenting, EF 58% (stress test)--POD#3 CABG on asa/bb/statin  Expected postop SAL-- 1 PRBC 12/7  HTN--stable  Dyslipidemia--statin    Routine care--d/c chest tubes, wires  Add Mucinex  Mobilize  Anticipate home in next 1-2 days    **will d/w Dr. Boyle about re-adding Plavix with hx of prior " stents--will back prior to discharge    Lynn Ruelas, APRN  12/9/2019  10:16 AM     Okay to restart Plavix.

## 2019-12-09 NOTE — PROGRESS NOTES
"NEPHROLOGY PROGRESS NOTE------[facility]    [facility]  [phone]  Luis Goff MD      Patient Care Team:  Sandra Cha PA as PCP - General  James Hernandez MD as Consulting Physician (Cardiology)  Marilyn Goff MD as Consulting Physician (Nephrology)      Provider:  Luis Goff MD  Patient Name: Duke Freire  :  1960    SUBJECTIVE:    Up in chair. No SOB, CP, dysuria.    Medication:    aspirin 325 mg Oral Daily   atorvastatin 40 mg Oral Nightly   chlorhexidine 15 mL Mouth/Throat Q12H   docusate sodium 100 mg Oral BID   enoxaparin 40 mg Subcutaneous Daily   guaiFENesin 600 mg Oral Q12H   insulin lispro 0-9 Units Subcutaneous 4x Daily With Meals & Nightly   levothyroxine 75 mcg Oral Q AM   metoprolol tartrate 12.5 mg Oral Q12H   mupirocin 1 application Each Nare BID   pantoprazole 40 mg Oral Q AM   polyethylene glycol 17 g Oral BID   senna 1 tablet Oral BID       insulin 0-50 Units/hr Last Rate: Stopped (19 2240)       OBJECTIVE    Vital Sign Min/Max for last 24 hours  Temp  Min: 98.3 °F (36.8 °C)  Max: 98.9 °F (37.2 °C)   BP  Min: 89/48  Max: 133/79   Pulse  Min: 60  Max: 82   Resp  Min: 16  Max: 16   SpO2  Min: 89 %  Max: 100 %   No data recorded   Weight  Min: 90.7 kg (199 lb 15.3 oz)  Max: 90.7 kg (199 lb 15.3 oz)     Flowsheet Rows      First Filed Value   Admission Height  177.8 cm (70\") Documented at 2019 1632   Admission Weight  87.9 kg (193 lb 12.6 oz) Documented at 2019 1632          I/O this shift:  In: 480 [P.O.:480]  Out: -   I/O last 3 completed shifts:  In: 1967 [P.O.:1520; I.V.:447]  Out: 1370 [Urine:950; Chest Tube:420]    Physical Exam:  General Appearance: alert, appears stated age and cooperative  Head: normocephalic, without obvious abnormality and atraumatic  Eyes: conjunctivae and sclerae normal and no icterus  Neck: supple and no JVD  Lungs: DECREASED BS BIBASILAR  Heart: " regular rhythm & normal rate and normal S1, S2 +IRIS  Chest: Wall no abnormalities observed  Abdomen: +HYPOACTIVE BS  Extremities: moves extremities well, no edema, no cyanosis and no redness  Skin: no bleeding, bruising or rash, turgor normal, color normal and no lesions noted  Neurologic: Alert, and oriented. No focal deficits    Labs:    WBC WBC   Date Value Ref Range Status   12/09/2019 6.50 3.40 - 10.80 10*3/mm3 Final   12/08/2019 8.30 3.40 - 10.80 10*3/mm3 Final   12/07/2019 7.30 3.40 - 10.80 10*3/mm3 Final   12/06/2019 6.30 3.40 - 10.80 10*3/mm3 Final   12/06/2019 7.80 3.40 - 10.80 10*3/mm3 Final   12/06/2019 6.30 3.40 - 10.80 10*3/mm3 Final      HGB Hemoglobin   Date Value Ref Range Status   12/09/2019 8.5 (L) 13.0 - 17.7 g/dL Final   12/08/2019 8.3 (L) 13.0 - 17.7 g/dL Final   12/07/2019 7.1 (L) 13.0 - 17.7 g/dL Final   12/07/2019 5.9 (L) 12.0 - 17.0 g/dL Final   12/06/2019 7.6 (L) 12.0 - 17.0 g/dL Final   12/06/2019 8.5 (L) 12.0 - 17.0 g/dL Final   12/06/2019 8.1 (L) 12.0 - 17.0 g/dL Final   12/06/2019 7.9 (L) 12.0 - 17.0 g/dL Final   12/06/2019 8.8 (L) 13.0 - 17.7 g/dL Final   12/06/2019 9.8 (L) 13.0 - 17.7 g/dL Final   12/06/2019 7.8 (C) 12.0 - 17.0 g/dL Final   12/06/2019 8.4 (L) 13.0 - 17.7 g/dL Final     Comment:     Result checked    12/06/2019 7.5 (C) 12.0 - 17.0 g/dL Final   12/06/2019 8.2 (L) 12.0 - 17.0 g/dL Final   12/06/2019 8.2 (L) 12.0 - 17.0 g/dL Final   12/06/2019 8.8 (L) 12.0 - 17.0 g/dL Final   12/06/2019 8.8 (L) 12.0 - 17.0 g/dL Final   12/06/2019 10.2 (L) 12.0 - 17.0 g/dL Final      HCT Hematocrit   Date Value Ref Range Status   12/09/2019 23.9 (L) 37.5 - 51.0 % Final   12/08/2019 23.1 (L) 37.5 - 51.0 % Final   12/07/2019 19.3 (C) 37.5 - 51.0 % Final   12/07/2019 17 (L) 38 - 51 % Final   12/06/2019 22 (L) 38 - 51 % Final   12/06/2019 25 (L) 38 - 51 % Final   12/06/2019 24 (L) 38 - 51 % Final   12/06/2019 23 (L) 38 - 51 % Final   12/06/2019 23.8 (L) 37.5 - 51.0 % Final   12/06/2019 26.4  (L) 37.5 - 51.0 % Final   12/06/2019 23.0 (L) 38 - 51 % Final   12/06/2019 23.2 (L) 37.5 - 51.0 % Final     Comment:     Result checked    12/06/2019 22.0 (L) 38 - 51 % Final   12/06/2019 24.0 (L) 38 - 51 % Final   12/06/2019 24.0 (L) 38 - 51 % Final   12/06/2019 26.0 (L) 38 - 51 % Final   12/06/2019 26.0 (L) 38 - 51 % Final   12/06/2019 30.0 (L) 38 - 51 % Final      Platlets No results found for: LABPLAT   MCV MCV   Date Value Ref Range Status   12/09/2019 94.4 79.0 - 97.0 fL Final   12/08/2019 91.7 79.0 - 97.0 fL Final   12/07/2019 91.2 79.0 - 97.0 fL Final   12/06/2019 91.6 79.0 - 97.0 fL Final   12/06/2019 91.1 79.0 - 97.0 fL Final   12/06/2019 93.4 79.0 - 97.0 fL Final          Sodium Sodium   Date Value Ref Range Status   12/09/2019 138 136 - 145 mmol/L Final   12/08/2019 136 136 - 145 mmol/L Final   12/07/2019 147 (H) 136 - 145 mmol/L Final   12/06/2019 140 136 - 145 mmol/L Final      Potassium Potassium   Date Value Ref Range Status   12/09/2019 4.3 3.5 - 5.2 mmol/L Final   12/08/2019 3.9 3.5 - 5.2 mmol/L Final   12/07/2019 4.3 3.5 - 5.2 mmol/L Final   12/07/2019 3.6 3.5 - 5.2 mmol/L Final   12/06/2019 3.9 3.5 - 5.2 mmol/L Final      Chloride Chloride   Date Value Ref Range Status   12/09/2019 100 98 - 107 mmol/L Final   12/08/2019 100 98 - 107 mmol/L Final   12/07/2019 108 (H) 98 - 107 mmol/L Final   12/06/2019 107 98 - 107 mmol/L Final      CO2 CO2   Date Value Ref Range Status   12/09/2019 29.0 22.0 - 29.0 mmol/L Final   12/08/2019 26.0 22.0 - 29.0 mmol/L Final   12/07/2019 27.0 22.0 - 29.0 mmol/L Final   12/06/2019 23.0 22.0 - 29.0 mmol/L Final      BUN BUN   Date Value Ref Range Status   12/09/2019 17 6 - 20 mg/dL Final   12/08/2019 18 6 - 20 mg/dL Final   12/07/2019 18 6 - 20 mg/dL Final   12/06/2019 15 6 - 20 mg/dL Final      Creatinine Creatinine   Date Value Ref Range Status   12/09/2019 0.97 0.76 - 1.27 mg/dL Final   12/08/2019 0.94 0.76 - 1.27 mg/dL Final   12/07/2019 1.37 (H) 0.76 - 1.27 mg/dL  Final   12/06/2019 1.14 0.76 - 1.27 mg/dL Final      Calcium Calcium   Date Value Ref Range Status   12/09/2019 8.8 8.6 - 10.5 mg/dL Final   12/08/2019 8.7 8.6 - 10.5 mg/dL Final   12/07/2019 9.4 8.6 - 10.5 mg/dL Final   12/06/2019 9.1 8.6 - 10.5 mg/dL Final      PO4 No components found for: PO4   Albumin Albumin   Date Value Ref Range Status   12/09/2019 4.10 3.50 - 5.20 g/dL Final   12/08/2019 4.40 3.50 - 5.20 g/dL Final   12/07/2019 4.60 3.50 - 5.20 g/dL Final   12/06/2019 4.10 3.50 - 5.20 g/dL Final      Magnesium Magnesium   Date Value Ref Range Status   12/09/2019 2.3 1.6 - 2.6 mg/dL Final   12/08/2019 2.4 1.6 - 2.6 mg/dL Final   12/07/2019 2.8 (H) 1.6 - 2.6 mg/dL Final      Uric Acid No components found for: URIC ACID     Imaging Results (Last 72 Hours)     Procedure Component Value Units Date/Time    XR Chest 1 View [[id number]] Collected:  12/08/19 0809     Updated:  12/08/19 0813    Narrative:       DATE OF EXAM:  12/8/2019 5:51 AM     PROCEDURE:  XR CHEST 1 VW-     INDICATIONS:  Post-Op Heart Surgery; R94.39-Abnormal result of other cardiovascular  function study; I20.9-Angina pectoris, unspecified;  I25.110-Atherosclerotic heart disease of native coronary artery with  unstable angina pectoris; I10-Essential (primary) hypertension patient's  a 58-year-old male status post Opperman heart surgery 3 days ago.  History of smoking and high blood pressure     COMPARISON:  Study of 12/07/2019     TECHNIQUE:   Single radiographic AP view of the chest was obtained.     FINDINGS:  Today's portable erect study was obtained on 12/08/2019 at 5:50 AM.     The heart is enlarged. Mediastinal drain and left chest tube are present  a right internal jugular sheath is present. The [place]-Prabhakar catheter has  been withdrawn. Bilateral basilar areas of atelectasis are seen which  appears stable from yesterday's study. There has been no interval  worsening.        Impression:          1. Interval removal of [place]-Prabhakar catheter  2.  Right internal jugular sheath, mediastinal drain and left chest tube  remain in good position  3. Bilateral atelectatic changes are seen which appears stable from  yesterday's study     Electronically Signed By-Delvin Cordova Jr. On:12/8/2019 8:11 AM  This report was finalized on [id number] by  Delvin Cordova Jr., .    XR Chest 1 View [[id number]] Collected:  12/07/19 0736     Updated:  12/07/19 0740    Narrative:       DATE OF EXAM:  12/7/2019 5:52 AM     PROCEDURE:  XR CHEST 1 VW-     INDICATIONS:  Post-Op Heart Surgery; R94.39-Abnormal result of other cardiovascular  function study; I20.9-Angina pectoris, unspecified;  I25.110-Atherosclerotic heart disease of native coronary artery with  unstable angina pectoris; I10-Essential (primary) hypertension patient's  a 58-year-old male who presents with history of being postop cardiac  surgery, shortness of breath, high blood pressure. Today's postop day 1     COMPARISON:  Study of 12/06/2019     TECHNIQUE:   Single radiographic AP view of the chest was obtained.     FINDINGS:  Today's exam was obtained a portable erect position on 12/07/2019 at  5:54 AM     The ET tube and NG tube have been withdrawn. A right internal jugular  [place]-Prabhakar catheter is present with tip in the main pulmonary artery in  good position. The left-sided chest tube and left-sided mediastinal  drain are again seen bilateral basilar areas of atelectasis are present  which appears stable from yesterday's study. There is no evidence of  pneumothorax. The upper abdomen appears unremarkable. Changes of CABG  are noted.        Impression:          1. Interval removal of ET tube and NG tube  2. Bilateral basilar areas of atelectasis which appears stable  3. [place]-Prabhakar catheter, mediastinal drain and left chest tube remain in  good position     Electronically Signed By-Delvin Cordova Jr. On:12/7/2019 7:38 AM  This report was finalized on [id number] by  Delvin Cordova Jr., .    XR Chest 1 View [[id number]]  Collected:  12/06/19 2159     Updated:  12/06/19 2203    Narrative:       XR CHEST 1 VW-     Date of Exam: 12/6/2019 9:10 PM     Indication: Post-Op Check Line & Tube Placement; R94.39-Abnormal  result of other cardiovascular function study; I20.9-Angina pectoris,  unspecified; I25.110-Atherosclerotic heart disease of native coronary  artery with unstable angina pectoris; I10-Essential (primary)  hypertension  58-year-old, postop day 0 from CABG     Comparison: 12/05/2019     Technique: 1 view(s) of the chest were obtained. 2 images are  submitted.     FINDINGS: There is been interval CABG. Tip of a right IJ pulmonary  artery catheter in the region of the right main pulmonary artery. NG  tube side-port in the distal esophagus with the tip at the GE junction,  this should be advanced. Mediastinal drain and left chest tube. The  position. Tip of an ET tube is in the midline 5 cm above the [place]. No  pneumothorax or pleural effusion. There is bibasilar plate atelectasis.  Pulmonary vascularity is normal.       Impression:       Interval CABG with plate atelectasis in each lung base.  The side-port of the NG tube is in the distal esophagus with the tip  near the GE junction. This should be advanced approximately 10 cm. Other  support lines and tubes are in good position.        Electronically Signed By-Rosalio Canales DO. On:12/6/2019 10:01 PM  This report was finalized on [id number] by  Rosalio Canales DO..          Results for orders placed during the hospital encounter of 12/04/19   XR Chest 1 View    Narrative DATE OF EXAM:  12/8/2019 5:51 AM     PROCEDURE:  XR CHEST 1 VW-     INDICATIONS:  Post-Op Heart Surgery; R94.39-Abnormal result of other cardiovascular  function study; I20.9-Angina pectoris, unspecified;  I25.110-Atherosclerotic heart disease of native coronary artery with  unstable angina pectoris; I10-Essential (primary) hypertension patient's  a 58-year-old male status post Opperman heart surgery 3 days  ago.  History of smoking and high blood pressure     COMPARISON:  Study of 12/07/2019     TECHNIQUE:   Single radiographic AP view of the chest was obtained.     FINDINGS:  Today's portable erect study was obtained on 12/08/2019 at 5:50 AM.     The heart is enlarged. Mediastinal drain and left chest tube are present  a right internal jugular sheath is present. The [place]-Prabhakar catheter has  been withdrawn. Bilateral basilar areas of atelectasis are seen which  appears stable from yesterday's study. There has been no interval  worsening.        Impression    1. Interval removal of [place]-Prabhakar catheter  2. Right internal jugular sheath, mediastinal drain and left chest tube  remain in good position  3. Bilateral atelectatic changes are seen which appears stable from  yesterday's study     Electronically Signed By-Delvin Cordova Jr. On:12/8/2019 8:11 AM  This report was finalized on [id number] by  Delvin Cordova Jr., .   XR Chest 1 View    Narrative DATE OF EXAM:  12/7/2019 5:52 AM     PROCEDURE:  XR CHEST 1 VW-     INDICATIONS:  Post-Op Heart Surgery; R94.39-Abnormal result of other cardiovascular  function study; I20.9-Angina pectoris, unspecified;  I25.110-Atherosclerotic heart disease of native coronary artery with  unstable angina pectoris; I10-Essential (primary) hypertension patient's  a 58-year-old male who presents with history of being postop cardiac  surgery, shortness of breath, high blood pressure. Today's postop day 1     COMPARISON:  Study of 12/06/2019     TECHNIQUE:   Single radiographic AP view of the chest was obtained.     FINDINGS:  Today's exam was obtained a portable erect position on 12/07/2019 at  5:54 AM     The ET tube and NG tube have been withdrawn. A right internal jugular  [place]-Prabhakar catheter is present with tip in the main pulmonary artery in  good position. The left-sided chest tube and left-sided mediastinal  drain are again seen bilateral basilar areas of atelectasis are present  which  appears stable from yesterday's study. There is no evidence of  pneumothorax. The upper abdomen appears unremarkable. Changes of CABG  are noted.        Impression    1. Interval removal of ET tube and NG tube  2. Bilateral basilar areas of atelectasis which appears stable  3. [place]-Prabhakar catheter, mediastinal drain and left chest tube remain in  good position     Electronically Signed By-Delvin Cordova Jr. On:12/7/2019 7:38 AM  This report was finalized on [id number] by  Delvin Cordova Jr., .   XR Chest 1 View    Narrative XR CHEST 1 VW-     Date of Exam: 12/6/2019 9:10 PM     Indication: Post-Op Check Line & Tube Placement; R94.39-Abnormal  result of other cardiovascular function study; I20.9-Angina pectoris,  unspecified; I25.110-Atherosclerotic heart disease of native coronary  artery with unstable angina pectoris; I10-Essential (primary)  hypertension  58-year-old, postop day 0 from CABG     Comparison: 12/05/2019     Technique: 1 view(s) of the chest were obtained. 2 images are  submitted.     FINDINGS: There is been interval CABG. Tip of a right IJ pulmonary  artery catheter in the region of the right main pulmonary artery. NG  tube side-port in the distal esophagus with the tip at the GE junction,  this should be advanced. Mediastinal drain and left chest tube. The  position. Tip of an ET tube is in the midline 5 cm above the [place]ino. No  pneumothorax or pleural effusion. There is bibasilar plate atelectasis.  Pulmonary vascularity is normal.       Impression Interval CABG with plate atelectasis in each lung base.  The side-port of the NG tube is in the distal esophagus with the tip  near the GE junction. This should be advanced approximately 10 cm. Other  support lines and tubes are in good position.        Electronically Signed By-Rosalio Canales DO. On:12/6/2019 10:01 PM  This report was finalized on [id number] by  Rosalio Canales DO..       Results for orders placed during the hospital encounter of 12/04/19    Duplex Vein Mapping Lower Extremity - Bilateral CAR    Narrative · The right greater saphenous vein is patent and of adequate size in the   thigh.  · The left greater saphenous vein is patent and of adequate size in the   thigh.  · The left greater saphenous vein is patent and of adequate size in the   calf.  · The right greater saphenous vein is patent and of adequate size in the   calf.            ASSESSMENT / PLAN      Abnormal nuclear stress test    Angina pectoris (CMS/HCC)    Coronary artery disease involving native coronary artery of native heart with unstable angina pectoris (CMS/HCC)    Essential hypertension    Coronary artery disease    1. ARF/ZAIN/CRF/CKD STG 2------Nonoliguric. ARF/ZAIN resolved. PRBCs PRN to help renal perfusion. +ARF/ZAIN on top of what appears to be CRF/CKD STG 2, with a baseline serum creatinine of 1.1. Unknown if patient has baseline proteinuria or hematuria. CRF/CKD STG 2 is likely secondary to HTN NS. +ARF/ZAIN is already better and likely was secondary to slight prerenal state with concomitant ACE-I use. RENAL US showed cortical thinning compatible with chronic medical kidney disease.  Keep off of ACE-I for now.   No NSAIDs or further IV dye.  D/C'd holman      2. HTN WITH CKD-------Avoid hypotension. BP okay. No ACE/ARB/DRI/diuretic for now     3. CAD S/P CATH S/P CABG-----per Cardiology and CT surgery     4. HYPERLIPIDEMIA------On Statin. TSH 19.08. CK ok. Synthroid     5. OA/DJD------No NSAIDs.       6. HYPOTHYROIDISM---Synthroid    7. ANEMIA-----H/H up s/p PRBCs PRN.     8. HYPERNATREMIA-------Resolved. D/C'd  IVFs    9. HYPOPHOSPHATEMIA------Replace with KPhos as neeeded.              Luis Goff MD  [facility]  [phone]  12/09/19  9:45 AM

## 2019-12-09 NOTE — PLAN OF CARE
Problem: Patient Care Overview  Goal: Plan of Care Review  Outcome: Ongoing (interventions implemented as appropriate)  Flowsheets (Taken 12/9/2019 0357)  Progress: improving  Plan of Care Reviewed With: patient  Outcome Summary: Patient is doing well today, remains on 1-2 L of oxygen at this time. Patient isnt on any drips. Chest tubes and AV wires were removed today. Will continue to monitor.

## 2019-12-09 NOTE — PROGRESS NOTES
Continued Stay Note  CARON Yoder     Patient Name: Duke Freire  MRN: [id number]  Today's Date: 12/9/2019    Admit Date: 12/4/2019    Discharge Plan     Row Name 12/09/19 1425       Plan    Plan  Home with family at this time.     Plan Comments  Barriers to discharge: POD #3 CABG. Not medically ready for discharge.         Anna Naegele RN CM   Phone [phone]  Fax   [phone]

## 2019-12-09 NOTE — CONSULTS
Continued Stay Note  CARON Yoder     Patient Name: Duke Freire  MRN: [id number]  Today's Date: 12/9/2019    Admit Date: 12/4/2019    Discharge Plan     Row Name 12/09/19 1519       Plan    Plan  D/C plan home with assist.    Patient/Family in Agreement with Plan  yes    Plan Comments  SW spoke to pt at bedside concerning information provided by CM.  Pt reports that he lives in a mobile home with his 11 year old daughter.  Pt reports that he has concerns about her care while he is home recovering from CABG.  Pt reports that his daughter will be staying with family while he is recouping who will take care of her and make sure she gets to and from schooll every day.  Pt reports that he will have people coming and going to check on him but expresses anxiety about not having someone there around the clock.  Pt does not qualify for ECF stay or home health.       KRISTOPHER Beal, LSW    Office Phone:  [phone]  Office Fax:  [phone]  Email:  [email or web address]

## 2019-12-10 ENCOUNTER — NURSE TRIAGE (OUTPATIENT)
Dept: CALL CENTER | Facility: HOSPITAL | Age: 59
End: 2019-12-10

## 2019-12-10 VITALS
BODY MASS INDEX: 28.53 KG/M2 | OXYGEN SATURATION: 98 % | HEIGHT: 70 IN | DIASTOLIC BLOOD PRESSURE: 60 MMHG | SYSTOLIC BLOOD PRESSURE: 104 MMHG | RESPIRATION RATE: 16 BRPM | TEMPERATURE: 98 F | HEART RATE: 82 BPM | WEIGHT: 199.3 LBS

## 2019-12-10 PROBLEM — Z95.1 S/P CABG X 2: Status: ACTIVE | Noted: 2019-12-10

## 2019-12-10 LAB
ALBUMIN SERPL-MCNC: 3.7 G/DL (ref 3.5–5.2)
ALBUMIN/GLOB SERPL: 1.5 G/DL
ALP SERPL-CCNC: 58 U/L (ref 39–117)
ALT SERPL W P-5'-P-CCNC: 9 U/L (ref 1–41)
ANION GAP SERPL CALCULATED.3IONS-SCNC: 10 MMOL/L (ref 5–15)
AST SERPL-CCNC: 17 U/L (ref 1–40)
BILIRUB SERPL-MCNC: 0.7 MG/DL (ref 0.2–1.2)
BUN BLD-MCNC: 18 MG/DL (ref 6–20)
BUN/CREAT SERPL: 20.9 (ref 7–25)
CA-I SERPL ISE-MCNC: 1.15 MMOL/L (ref 1.2–1.3)
CALCIUM SPEC-SCNC: 8.8 MG/DL (ref 8.6–10.5)
CHLORIDE SERPL-SCNC: 99 MMOL/L (ref 98–107)
CO2 SERPL-SCNC: 28 MMOL/L (ref 22–29)
CREAT BLD-MCNC: 0.86 MG/DL (ref 0.76–1.27)
DEPRECATED RDW RBC AUTO: 46.8 FL (ref 37–54)
ERYTHROCYTE [DISTWIDTH] IN BLOOD BY AUTOMATED COUNT: 14 % (ref 12.3–15.4)
GFR SERPL CREATININE-BSD FRML MDRD: 92 ML/MIN/1.73
GLOBULIN UR ELPH-MCNC: 2.4 GM/DL
GLUCOSE BLD-MCNC: 104 MG/DL (ref 65–99)
GLUCOSE BLDC GLUCOMTR-MCNC: 101 MG/DL (ref 70–105)
GLUCOSE BLDC GLUCOMTR-MCNC: 87 MG/DL (ref 70–105)
HCT VFR BLD AUTO: 22.9 % (ref 37.5–51)
HGB BLD-MCNC: 8.1 G/DL (ref 13–17.7)
MAGNESIUM SERPL-MCNC: 2.2 MG/DL (ref 1.6–2.6)
MCH RBC QN AUTO: 33.7 PG (ref 26.6–33)
MCHC RBC AUTO-ENTMCNC: 35.4 G/DL (ref 31.5–35.7)
MCV RBC AUTO: 95.4 FL (ref 79–97)
PHOSPHATE SERPL-MCNC: 2.2 MG/DL (ref 2.5–4.5)
PLATELET # BLD AUTO: 168 10*3/MM3 (ref 140–450)
PMV BLD AUTO: 8 FL (ref 6–12)
POTASSIUM BLD-SCNC: 3.9 MMOL/L (ref 3.5–5.2)
PROT SERPL-MCNC: 6.1 G/DL (ref 6–8.5)
RBC # BLD AUTO: 2.4 10*6/MM3 (ref 4.14–5.8)
SODIUM BLD-SCNC: 137 MMOL/L (ref 136–145)
WBC NRBC COR # BLD: 6 10*3/MM3 (ref 3.4–10.8)

## 2019-12-10 PROCEDURE — 97116 GAIT TRAINING THERAPY: CPT

## 2019-12-10 PROCEDURE — 97535 SELF CARE MNGMENT TRAINING: CPT

## 2019-12-10 PROCEDURE — 97530 THERAPEUTIC ACTIVITIES: CPT

## 2019-12-10 PROCEDURE — 82962 GLUCOSE BLOOD TEST: CPT

## 2019-12-10 PROCEDURE — 82330 ASSAY OF CALCIUM: CPT | Performed by: INTERNAL MEDICINE

## 2019-12-10 PROCEDURE — 99024 POSTOP FOLLOW-UP VISIT: CPT | Performed by: NURSE PRACTITIONER

## 2019-12-10 PROCEDURE — 83735 ASSAY OF MAGNESIUM: CPT | Performed by: INTERNAL MEDICINE

## 2019-12-10 PROCEDURE — 80053 COMPREHEN METABOLIC PANEL: CPT | Performed by: INTERNAL MEDICINE

## 2019-12-10 PROCEDURE — 84100 ASSAY OF PHOSPHORUS: CPT | Performed by: INTERNAL MEDICINE

## 2019-12-10 PROCEDURE — 85027 COMPLETE CBC AUTOMATED: CPT | Performed by: NURSE PRACTITIONER

## 2019-12-10 RX ORDER — FERROUS SULFATE 325(65) MG
325 TABLET ORAL
Qty: 30 TABLET | Refills: 0 | Status: SHIPPED | OUTPATIENT
Start: 2019-12-10

## 2019-12-10 RX ORDER — FUROSEMIDE 40 MG/1
40 TABLET ORAL DAILY
Qty: 5 TABLET | Refills: 0 | Status: SHIPPED | OUTPATIENT
Start: 2019-12-10 | End: 2019-12-13 | Stop reason: SDUPTHER

## 2019-12-10 RX ORDER — LEVOTHYROXINE SODIUM 0.07 MG/1
75 TABLET ORAL DAILY
Qty: 30 TABLET | Refills: 3 | Status: SHIPPED | OUTPATIENT
Start: 2019-12-10 | End: 2020-04-28 | Stop reason: SDUPTHER

## 2019-12-10 RX ORDER — HYDROCODONE BITARTRATE AND ACETAMINOPHEN 5; 325 MG/1; MG/1
1 TABLET ORAL EVERY 4 HOURS PRN
Qty: 30 TABLET | Refills: 0 | Status: SHIPPED | OUTPATIENT
Start: 2019-12-10 | End: 2019-12-16

## 2019-12-10 RX ORDER — ATORVASTATIN CALCIUM 40 MG/1
40 TABLET, FILM COATED ORAL NIGHTLY
Qty: 30 TABLET | Refills: 3 | Status: SHIPPED | OUTPATIENT
Start: 2019-12-10 | End: 2020-03-20 | Stop reason: SDUPTHER

## 2019-12-10 RX ORDER — POTASSIUM CHLORIDE 750 MG/1
10 CAPSULE, EXTENDED RELEASE ORAL 2 TIMES DAILY
Qty: 10 CAPSULE | Refills: 0 | Status: SHIPPED | OUTPATIENT
Start: 2019-12-10 | End: 2019-12-15

## 2019-12-10 RX ADMIN — LEVOTHYROXINE SODIUM 75 MCG: 75 TABLET ORAL at 05:07

## 2019-12-10 RX ADMIN — GUAIFENESIN 600 MG: 600 TABLET, EXTENDED RELEASE ORAL at 08:48

## 2019-12-10 RX ADMIN — POTASSIUM PHOSPHATE, MONOBASIC 1000 MG: 500 TABLET, SOLUBLE ORAL at 10:16

## 2019-12-10 RX ADMIN — SENNOSIDES 1 TABLET: 8.6 TABLET, FILM COATED ORAL at 08:49

## 2019-12-10 RX ADMIN — ASPIRIN 325 MG: 325 TABLET, DELAYED RELEASE ORAL at 08:48

## 2019-12-10 RX ADMIN — DOCUSATE SODIUM 100 MG: 100 CAPSULE, LIQUID FILLED ORAL at 08:49

## 2019-12-10 RX ADMIN — ACETAMINOPHEN 650 MG: 325 TABLET ORAL at 05:07

## 2019-12-10 RX ADMIN — MUPIROCIN 1 APPLICATION: 20 OINTMENT TOPICAL at 08:50

## 2019-12-10 RX ADMIN — CHLORHEXIDINE GLUCONATE 0.12% ORAL RINSE 15 ML: 1.2 LIQUID ORAL at 08:48

## 2019-12-10 RX ADMIN — METOPROLOL TARTRATE 12.5 MG: 25 TABLET ORAL at 08:47

## 2019-12-10 RX ADMIN — PANTOPRAZOLE SODIUM 40 MG: 40 TABLET, DELAYED RELEASE ORAL at 05:07

## 2019-12-10 NOTE — TELEPHONE ENCOUNTER
"Caller has questions about his discharge medicines.  AVS reviewed with caller and questions answered.    Reason for Disposition  • Caller has medication question only, adult not sick, and triager answers question    Additional Information  • Negative: Drug overdose and nurse unable to answer question  • Negative: Caller requesting information not related to medicine  • Negative: Caller requesting a prescription for Strep throat and has a positive culture result  • Negative: Rash while taking a medication or within 3 days of stopping it  • Negative: Immunization reaction suspected  • Negative: [1] Asthma and [2] having symptoms of asthma (cough, wheezing, etc)  • Negative: MORE THAN A DOUBLE DOSE of a prescription or over-the-counter (OTC) drug  • Negative: [1] DOUBLE DOSE (an extra dose or lesser amount) of over-the-counter (OTC) drug AND [2] any symptoms (e.g., dizziness, nausea, pain, sleepiness)  • Negative: [1] DOUBLE DOSE (an extra dose or lesser amount) of prescription drug AND [2] any symptoms (e.g., dizziness, nausea, pain, sleepiness)  • Negative: Took another person's prescription drug  • Negative: [1] DOUBLE DOSE (an extra dose or lesser amount) of prescription drug AND [2] NO symptoms (Exception: a double dose of antibiotics)  • Negative: Diabetes drug error or overdose (e.g., insulin or extra dose)  • Negative: [1] Request for URGENT new prescription or refill of \"essential\" medication (i.e., likelihood of harm to patient if not taken) AND [2] triager unable to fill per unit policy  • Negative: [1] Prescription not at pharmacy AND [2] was prescribed today by PCP  • Negative: Pharmacy calling with prescription questions and triager unable to answer question  • Negative: Caller has URGENT medication question about med that PCP prescribed and triager unable to answer question  • Negative: Caller has NON-URGENT medication question about med that PCP prescribed and triager unable to answer question  • " "Negative: Caller requesting a NON-URGENT new prescription or refill and triager unable to refill per unit policy  • Negative: Caller has medication question about med not prescribed by PCP and triager unable to answer question (e.g., compatibility with other med, storage)  • Negative: [1] DOUBLE DOSE (an extra dose or lesser amount) of over-the-counter (OTC) drug AND [2] NO symptoms  • Negative: [1] DOUBLE DOSE (an extra dose or lesser amount) of antibiotic drug AND [2] NO symptoms  • Negative: Caller has medication question, adult has minor symptoms, caller declines triage, and triager answers question    Answer Assessment - Initial Assessment Questions  1. SYMPTOMS: \"Do you have any symptoms?\"      no  2. SEVERITY: If symptoms are present, ask \"Are they mild, moderate or severe?\"      na    Protocols used: MEDICATION QUESTION CALL-ADULT-      "

## 2019-12-10 NOTE — PLAN OF CARE
Problem: Patient Care Overview  Goal: Plan of Care Review  12/10/2019 1040 by Sharon Loaiza, PT Student  Outcome: Ongoing (interventions implemented as appropriate)  Flowsheets (Taken 12/10/2019 1040)  Progress: improving (Pended)  Plan of Care Reviewed With: patient (Pended)  Outcome Summary: 59 yo male s/p CABG 12/6. Pt amb with sup-cga 400' and ascended and descended 7 stairs with no LOB. Vitals stable. Pt progressing per cardiac profile. Pt instructed to take breaks and is eager to do more. Endurance is improving, and will continue to progress as gait distance increases. Pt will be safe for home at d/c. (Pended)  12/10/2019 1035 by Sharon Loaiza, PT Student  Reactivated

## 2019-12-10 NOTE — PROGRESS NOTES
Case Management Discharge Note      Final Note: home         Final Discharge Disposition Code: 01 - home or self-care

## 2019-12-10 NOTE — THERAPY TREATMENT NOTE
Patient Name: Duke Freire  : 1960    MRN: [id number]                              Today's Date: 12/10/2019       Admit Date: 2019    Visit Dx:     ICD-10-CM ICD-9-CM   1. Acute renal failure, unspecified acute renal failure type (CMS/HCC) N17.9 584.9   2. Abnormal nuclear stress test R94.39 794.39   3. Angina pectoris (CMS/HCC) I20.9 413.9   4. Coronary artery disease involving native coronary artery of native heart with unstable angina pectoris (CMS/HCC) I25.110 414.01     411.1   5. Essential hypertension I10 401.9     Patient Active Problem List   Diagnosis   • Abnormal nuclear stress test   • Angina pectoris (CMS/HCC)   • Coronary artery disease involving native coronary artery of native heart with unstable angina pectoris (CMS/HCC)   • Essential hypertension   • Coronary artery disease     Past Medical History:   Diagnosis Date   • Coronary artery disease      Past Surgical History:   Procedure Laterality Date   • CARDIAC CATHETERIZATION Left 2019    Procedure: Left Heart Cath;  Surgeon: James Hernandez MD;  Location:  [facility];  Service: Cardiovascular   • CARDIAC CATHETERIZATION N/A 2019    Procedure: Coronary angiography;  Surgeon: James Hernandez MD;  Location:  [facility];  Service: Cardiovascular   • CORONARY ANGIOPLASTY WITH STENT PLACEMENT     • INNER EAR SURGERY       General Information     Row Name 12/10/19 102          PT Evaluation Time/Intention    Document Type  therapy note (daily note)  -CM (r) EW (t) CM (c)     Mode of Treatment  individual therapy;physical therapy  -CM (r) EW (t) CM (c)     Row Name 12/10/19 1021          General Information    Patient Profile Reviewed?  yes alert, eager to go home; Cardiac level IV  -CM (r) EW (t) CM (c)     Prior Level of Function  independent:  -CM (r) EW (t) CM (c)     Existing Precautions/Restrictions  sternal  -CM (r) EW (t) CM (c)     Row Name 12/10/19 3809  "         Cognitive Assessment/Intervention- PT/OT    Orientation Status (Cognition)  oriented x 4  -CM (r) EW (t) CM (c)     Row Name 12/10/19 1029          Safety Issues, Functional Mobility    Impairments Affecting Function (Mobility)  endurance/activity tolerance  -CM (r) EW (t) CM (c)       User Key  (r) = Recorded By, (t) = Taken By, (c) = Cosigned By    Initials Name Provider Type    Leyda Robledo, PT Physical Therapist    [facility], PT Student PT Student        Mobility     Row Name 12/10/19 1030          Sit-Stand Transfer    Sit-Stand [place] (Transfers)  supervision maintains precautions without verbal cues  -CM (r) EW (t) CM (c)     Row Name 12/10/19 1030          Gait/Stairs Assessment/Training    Gait/Stairs Assessment/Training  gait/ambulation independence  -CM (r) EW (t) CM (c)     [place] Level (Gait)  supervision  -CM (r) EW (t) CM (c)     Distance in Feet (Gait)  400  -CM (r) EW (t) CM (c)     Pattern (Gait)  step-through  -CM (r) EW (t) CM (c)     Deviations/Abnormal Patterns (Gait)  antalgic;gait speed decreased;base of support, narrow stated his swelling in feet makes him \"walk funny\"   -CM (r) EW (t) CM (c)     Negotiation (Stairs)  stairs independence  -CM (r) EW (t) CM (c)     [place] Level (Stairs)  contact guard  -CM (r) EW (t) CM (c)     Number of Steps (Stairs)  7  -CM (r) EW (t) CM (c)     Ascending Technique (Stairs)  step-over-step  -CM (r) EW (t) CM (c)     Descending Technique (Stairs)  step-to-step  -CM (r) EW (t) CM (c)       User Key  (r) = Recorded By, (t) = Taken By, (c) = Cosigned By    Initials Name Provider Type    Leyda Robledo, PT Physical Therapist     Sharon Loaiza, PT Student PT Student        Obj/Interventions     Row Name 12/10/19 1031          Static Sitting Balance    Level of [place] (Unsupported Sitting, Static Balance)  independent  -CM (r) EW (t) CM (c)     Sitting Position (Unsupported Sitting, Static Balance)  sitting in " chair  -CM (r) EW (t) CM (c)     Time Able to Maintain Position (Unsupported Sitting, Static Balance)  more than 5 minutes  -CM (r) EW (t) CM (c)     Row Name 12/10/19 1031          Dynamic Sitting Balance    Level of [place], Reaches Outside Midline (Sitting, Dynamic Balance)  independent  -CM (r) EW (t) CM (c)     Sitting Position, Reaches Outside Midline (Sitting, Dynamic Balance)  sitting in chair  -CM (r) EW (t) CM (c)     Row Name 12/10/19 1031          Static Standing Balance    Level of [place] (Supported Standing, Static Balance)  supervision  -CM (r) EW (t) CM (c)     Time Able to Maintain Position (Supported Standing, Static Balance)  4 to 5 minutes  -CM (r) EW (t) CM (c)     Row Name 12/10/19 1031          Dynamic Standing Balance    Level of [place], Reaches Outside Midline (Standing, Dynamic Balance)  supervision  -CM (r) EW (t) CM (c)     Time Able to Maintain Position, Reaches Outside Midline (Standing, Dynamic Balance)  3 to 4 minutes  -CM (r) EW (t) CM (c)     Row Name 12/10/19 1031          Sensory Assessment/Intervention    Sensory General Assessment  no sensation deficits identified  -CM (r) EW (t) CM (c)       User Key  (r) = Recorded By, (t) = Taken By, (c) = Cosigned By    Initials Name Provider Type    CM Leyda Saleh C, PT Physical Therapist    Sharno Dotson, PT Student PT Student        Goals/Plan     Row Name 12/10/19 1034          Bed Mobility Goal 1 (PT)    Progress/Outcomes (Bed Mobility Goal 1, PT)  good progress toward goal;goal ongoing  -CM (r) EW (t) CM (c)     Row Name 12/10/19 1034          Transfer Goal 1 (PT)    Progress/Outcome (Transfer Goal 1, PT)  good progress toward goal;goal ongoing  -CM (r) EW (t) CM (c)     Row Name 12/10/19 1034          Gait Training Goal 1 (PT)    Progress/Outcome (Gait Training Goal 1, PT)  good progress toward goal;goal ongoing  -CM (r) EW (t) CM (c)     Row Name 12/10/19 1034          Stairs Goal 1 (PT)    Activity/Assistive  Device (Stairs Goal 1, PT)  stairs, all skills  -CM (r) EW (t) CM (c)     Progress/Outcome (Stairs Goal 1, PT)  good progress toward goal;goal met  -CM (r) EW (t) CM (c)       User Key  (r) = Recorded By, (t) = Taken By, (c) = Cosigned By    Initials Name Provider Type    CM Leyda Saleh, PT Physical Therapist    EW Sharon Loaiza, PT Student PT Student        Clinical Impression     Row Name 12/10/19 1032          Pain Scale: Numbers Pre/Post-Treatment    Pain Scale: Numbers, Pretreatment  0/10 - no pain  -CM (r) EW (t) CM (c)     Pain Scale: Numbers, Post-Treatment  0/10 - no pain  -CM (r) EW (t) CM (c)     Row Name 12/10/19 1032          Physical Therapy Clinical Impression    Patient/Family Goals Statement (PT Clinical Impression)  59 yo male s/p CABG 12/6. Pt amb with sup-cga 400' and ascended and descended 7 stairs with no LOB. Vitals stable. Pt progressing per cardiac profile. Pt instructed to take breaks and is eager to do more. Endurance is improving, and will continue to progress as gait distance increases. Pt will be safe for home at d/c.   -CM (r) EW (t) CM (c)     Criteria for Skilled Interventions Met (PT Clinical Impression)  yes;treatment indicated  -CM (r) EW (t) CM (c)     Rehab Potential (PT Clinical Summary)  good, to achieve stated therapy goals  -CM (r) EW (t) CM (c)     Predicted Duration of Therapy (PT)  until d/c  -CM (r) EW (t) CM (c)     Row Name 12/10/19 1032          Vital Signs    Pre Systolic BP Rehab  147  -CM (r) EW (t) CM (c)     Pre Treatment Diastolic BP  82 107  -CM (r) EW (t) CM (c)     Post Systolic BP Rehab  140  -CM (r) EW (t) CM (c)     Post Treatment Diastolic BP  80 mAP 96  -CM (r) EW (t) CM (c)     Pretreatment Heart Rate (beats/min)  83  -CM (r) EW (t) CM (c)     Posttreatment Heart Rate (beats/min)  80  -CM (r) EW (t) CM (c)     Pre SpO2 (%)  100  -CM (r) EW (t) CM (c)     O2 Delivery Pre Treatment  room air  -CM (r) EW (t) CM (c)     Post SpO2 (%)  100  -CM (r) EW  (t) CM (c)     O2 Delivery Post Treatment  room air  -CM (r) EW (t) CM (c)     Pre Patient Position  Sitting  -CM (r) EW (t) CM (c)     Intra Patient Position  Standing  -CM (r) EW (t) CM (c)     Post Patient Position  Sitting  -CM (r) EW (t) CM (c)     Row Name 12/10/19 1032          Positioning and Restraints    Pre-Treatment Position  sitting in chair/recliner  -CM (r) EW (t) CM (c)     Post Treatment Position  chair  -CM (r) EW (t) CM (c)     In Chair  notified nsg;sitting;call light within reach;encouraged to call for assist  -CM (r) EW (t) CM (c)       User Key  (r) = Recorded By, (t) = Taken By, (c) = Cosigned By    Initials Name Provider Type    Leyda Robledo, PT Physical Therapist    [facility] PT Student        Outcome Measures     Row Name 12/10/19 1035          How much help from another person do you currently need...    Turning from your back to your side while in flat bed without using bedrails?  3  -CM (r) EW (t) CM (c)     Moving from lying on back to sitting on the side of a flat bed without bedrails?  4  -CM (r) EW (t) CM (c)     Moving to and from a bed to a chair (including a wheelchair)?  4  -CM (r) EW (t) CM (c)     Standing up from a chair using your arms (e.g., wheelchair, bedside chair)?  4  -CM (r) EW (t) CM (c)     Climbing 3-5 steps with a railing?  3  -CM (r) EW (t) CM (c)     To walk in hospital room?  4  -CM (r) EW (t) CM (c)     AM-PAC 6 Clicks Score (PT)  22  -CM (r) EW (t)     Row Name 12/10/19 1035          Functional Assessment    Outcome Measure Options  AM-PAC 6 Clicks Basic Mobility (PT)  -CM (r) EW (t) CM (c)       User Key  (r) = Recorded By, (t) = Taken By, (c) = Cosigned By    Initials Name Provider Type    CM Merk, Leyda C, PT Physical Therapist    Sharon Dotson, PT Student PT Student          PT Recommendation and Plan     Outcome Summary/Treatment Plan (PT)  Anticipated Discharge Disposition (PT): home  Plan of Care Reviewed With:  patient  Progress: improving  Outcome Summary: 59 yo male s/p CABG 12/6. Pt amb with sup-cga 400' and ascended and descended 7 stairs with no LOB. Vitals stable. Pt progressing per cardiac profile. Pt instructed to take breaks and is eager to do more. Endurance is improving, and will continue to progress as gait distance increases. Pt will be safe for home at d/c.     Time Calculation:   PT Charges     Row Name 12/10/19 1040             Time Calculation    Start Time  1011  -CM (r) EW (t) CM (c)      Stop Time  1028  -CM (r) EW (t) CM (c)      Time Calculation (min)  17 min  -CM (r) EW (t)      PT Received On  12/10/19  -CM (r) EW (t) CM (c)      PT - Next Appointment  12/12/19  -CM (r) EW (t) CM (c)         Time Calculation- PT    Total Timed Code Minutes- PT  17 minute(s)  -CM (r) EW (t) CM (c)        User Key  (r) = Recorded By, (t) = Taken By, (c) = Cosigned By    Initials Name Provider Type    Leyda Robledo, PT Physical Therapist    Sharon Dotson, PT Student PT Student        Therapy Charges for Today     Code Description Service Date Service Provider Modifiers Qty    [id number] HC GAIT TRAINING EA 15 MIN 12/10/2019 Sharon Loaiza, PT Student GP 1    [id number]  PT THERAPEUTIC ACT EA 15 MIN 12/10/2019 Sharon Loaiza, PT Student GP 1          PT G-Codes  Outcome Measure Options: AM-PAC 6 Clicks Basic Mobility (PT)  AM-PAC 6 Clicks Score (PT): 22    Sharon Loaiza PT Student  12/10/2019

## 2019-12-10 NOTE — PROGRESS NOTES
"NEPHROLOGY PROGRESS NOTE------[facility]    [facility]  [phone]  Luis Goff MD      Patient Care Team:  Sandra Cha PA as PCP - General  James Hernandez MD as Consulting Physician (Cardiology)  Marilyn Goff MD as Consulting Physician (Nephrology)      Provider:  Luis Goff MD  Patient Name: Duke Freire  :  1960    SUBJECTIVE:    Up in chair. No SOB, CP, dysuria. No angina. No palpitations.    Medication:    aspirin 325 mg Oral Daily   atorvastatin 40 mg Oral Nightly   chlorhexidine 15 mL Mouth/Throat Q12H   docusate sodium 100 mg Oral BID   enoxaparin 40 mg Subcutaneous Daily   guaiFENesin 600 mg Oral Q12H   insulin lispro 0-9 Units Subcutaneous 4x Daily With Meals & Nightly   levothyroxine 75 mcg Oral Q AM   metoprolol tartrate 12.5 mg Oral Q12H   mupirocin 1 application Each Nare BID   pantoprazole 40 mg Oral Q AM   polyethylene glycol 17 g Oral BID   senna 1 tablet Oral BID          OBJECTIVE    Vital Sign Min/Max for last 24 hours  Temp  Min: 97.4 °F (36.3 °C)  Max: 98.7 °F (37.1 °C)   BP  Min: 96/69  Max: 145/78   Pulse  Min: 73  Max: 89   Resp  Min: 16  Max: 16   SpO2  Min: 91 %  Max: 98 %   No data recorded   Weight  Min: 90.4 kg (199 lb 4.7 oz)  Max: 90.4 kg (199 lb 4.7 oz)     Flowsheet Rows      First Filed Value   Admission Height  177.8 cm (70\") Documented at 2019 1632   Admission Weight  87.9 kg (193 lb 12.6 oz) Documented at 2019 1632          No intake/output data recorded.  I/O last 3 completed shifts:  In: 1437 [P.O.:1200; I.V.:237]  Out: 1060 [Urine:850; Chest Tube:210]    Physical Exam:  General Appearance: alert, appears stated age and cooperative  Head: normocephalic, without obvious abnormality and atraumatic  Eyes: conjunctivae and sclerae normal and no icterus  Neck: supple and no JVD  Lungs: DECREASED BS BIBASILAR  Heart: regular rhythm & normal rate and normal S1, S2 " +IRIS  Chest: Wall no abnormalities observed  Abdomen: +BS; soft; NT/ND  Extremities: moves extremities well, no edema, no cyanosis and no redness  Skin: no bleeding, bruising or rash, turgor normal, color normal and no lesions noted  Neurologic: Alert, and oriented. No focal deficits    Labs:    WBC WBC   Date Value Ref Range Status   12/10/2019 6.00 3.40 - 10.80 10*3/mm3 Final   12/09/2019 6.50 3.40 - 10.80 10*3/mm3 Final   12/08/2019 8.30 3.40 - 10.80 10*3/mm3 Final      HGB Hemoglobin   Date Value Ref Range Status   12/10/2019 8.1 (L) 13.0 - 17.7 g/dL Final   12/09/2019 8.5 (L) 13.0 - 17.7 g/dL Final   12/08/2019 8.3 (L) 13.0 - 17.7 g/dL Final      HCT Hematocrit   Date Value Ref Range Status   12/10/2019 22.9 (L) 37.5 - 51.0 % Final   12/09/2019 23.9 (L) 37.5 - 51.0 % Final   12/08/2019 23.1 (L) 37.5 - 51.0 % Final      Platlets No results found for: LABPLAT   MCV MCV   Date Value Ref Range Status   12/10/2019 95.4 79.0 - 97.0 fL Final   12/09/2019 94.4 79.0 - 97.0 fL Final   12/08/2019 91.7 79.0 - 97.0 fL Final          Sodium Sodium   Date Value Ref Range Status   12/10/2019 137 136 - 145 mmol/L Final   12/09/2019 138 136 - 145 mmol/L Final   12/08/2019 136 136 - 145 mmol/L Final      Potassium Potassium   Date Value Ref Range Status   12/10/2019 3.9 3.5 - 5.2 mmol/L Final   12/09/2019 4.3 3.5 - 5.2 mmol/L Final   12/08/2019 3.9 3.5 - 5.2 mmol/L Final   12/07/2019 4.3 3.5 - 5.2 mmol/L Final      Chloride Chloride   Date Value Ref Range Status   12/10/2019 99 98 - 107 mmol/L Final   12/09/2019 100 98 - 107 mmol/L Final   12/08/2019 100 98 - 107 mmol/L Final      CO2 CO2   Date Value Ref Range Status   12/10/2019 28.0 22.0 - 29.0 mmol/L Final   12/09/2019 29.0 22.0 - 29.0 mmol/L Final   12/08/2019 26.0 22.0 - 29.0 mmol/L Final      BUN BUN   Date Value Ref Range Status   12/10/2019 18 6 - 20 mg/dL Final   12/09/2019 17 6 - 20 mg/dL Final   12/08/2019 18 6 - 20 mg/dL Final      Creatinine Creatinine   Date Value  Ref Range Status   12/10/2019 0.86 0.76 - 1.27 mg/dL Final   12/09/2019 0.97 0.76 - 1.27 mg/dL Final   12/08/2019 0.94 0.76 - 1.27 mg/dL Final      Calcium Calcium   Date Value Ref Range Status   12/10/2019 8.8 8.6 - 10.5 mg/dL Final   12/09/2019 8.8 8.6 - 10.5 mg/dL Final   12/08/2019 8.7 8.6 - 10.5 mg/dL Final      PO4 No components found for: PO4   Albumin Albumin   Date Value Ref Range Status   12/10/2019 3.70 3.50 - 5.20 g/dL Final   12/09/2019 4.10 3.50 - 5.20 g/dL Final   12/08/2019 4.40 3.50 - 5.20 g/dL Final      Magnesium Magnesium   Date Value Ref Range Status   12/10/2019 2.2 1.6 - 2.6 mg/dL Final   12/09/2019 2.3 1.6 - 2.6 mg/dL Final   12/08/2019 2.4 1.6 - 2.6 mg/dL Final      Uric Acid No components found for: URIC ACID     Imaging Results (Last 72 Hours)     Procedure Component Value Units Date/Time    XR Chest 1 View [[id number]] Collected:  12/09/19 1305     Updated:  12/09/19 1308    Narrative:       DATE OF EXAM:  12/9/2019 12:53 PM     PROCEDURE:  XR CHEST 1 VW-     INDICATIONS:  Post chest tube removal; R94.39-Abnormal result of other cardiovascular  function study; I20.9-Angina pectoris, unspecified;  I25.110-Atherosclerotic heart disease of native coronary artery with  unstable angina pectoris; I10-Essential (primary) hypertension       COMPARISON:  AP portable chest 12/08/2019.     TECHNIQUE:   Single radiographic view of the chest was obtained.     FINDINGS:  Right internal jugular introducer sheath has been removed. Mediastinal  drain and left chest tube have been removed. No pneumothorax is visible.     There is stable mild cardiac enlargement with CABG. Mild central  vascular congestive changes are present. Linear bilateral perihilar and  bibasilar atelectatic changes persist. Atelectasis is greatest in the  left lower lobe. No definite pleural effusion or pneumothorax is seen.     The study is mildly motion degraded.       Impression:          1. Mild bilateral perihilar and bibasilar  atelectasis, similar to  yesterday's study.  2. Interval removal of mediastinal drain, right IJ introducer sheath and  left chest tube. No visible pneumothorax.     Electronically Signed By-Dr. Sayra Anand MD On:12/9/2019 1:06 PM  This report was finalized on [id number] by Dr. Sayra Anand MD.    XR Chest 1 View [[id number]] Collected:  12/08/19 0809     Updated:  12/08/19 0813    Narrative:       DATE OF EXAM:  12/8/2019 5:51 AM     PROCEDURE:  XR CHEST 1 VW-     INDICATIONS:  Post-Op Heart Surgery; R94.39-Abnormal result of other cardiovascular  function study; I20.9-Angina pectoris, unspecified;  I25.110-Atherosclerotic heart disease of native coronary artery with  unstable angina pectoris; I10-Essential (primary) hypertension patient's  a 58-year-old male status post Opperman heart surgery 3 days ago.  History of smoking and high blood pressure     COMPARISON:  Study of 12/07/2019     TECHNIQUE:   Single radiographic AP view of the chest was obtained.     FINDINGS:  Today's portable erect study was obtained on 12/08/2019 at 5:50 AM.     The heart is enlarged. Mediastinal drain and left chest tube are present  a right internal jugular sheath is present. The [place]-Prabhakar catheter has  been withdrawn. Bilateral basilar areas of atelectasis are seen which  appears stable from yesterday's study. There has been no interval  worsening.        Impression:          1. Interval removal of [place]-Prabhakar catheter  2. Right internal jugular sheath, mediastinal drain and left chest tube  remain in good position  3. Bilateral atelectatic changes are seen which appears stable from  yesterday's study     Electronically Signed By-Delvin Cordova Jr. On:12/8/2019 8:11 AM  This report was finalized on [id number] by  Delvin Cordova Jr., .    XR Chest 1 View [[id number]] Collected:  12/07/19 0736     Updated:  12/07/19 0740    Narrative:       DATE OF EXAM:  12/7/2019 5:52 AM     PROCEDURE:  XR CHEST 1 VW-     INDICATIONS:  Post-Op  Heart Surgery; R94.39-Abnormal result of other cardiovascular  function study; I20.9-Angina pectoris, unspecified;  I25.110-Atherosclerotic heart disease of native coronary artery with  unstable angina pectoris; I10-Essential (primary) hypertension patient's  a 58-year-old male who presents with history of being postop cardiac  surgery, shortness of breath, high blood pressure. Today's postop day 1     COMPARISON:  Study of 12/06/2019     TECHNIQUE:   Single radiographic AP view of the chest was obtained.     FINDINGS:  Today's exam was obtained a portable erect position on 12/07/2019 at  5:54 AM     The ET tube and NG tube have been withdrawn. A right internal jugular  [place]-Prabhakar catheter is present with tip in the main pulmonary artery in  good position. The left-sided chest tube and left-sided mediastinal  drain are again seen bilateral basilar areas of atelectasis are present  which appears stable from yesterday's study. There is no evidence of  pneumothorax. The upper abdomen appears unremarkable. Changes of CABG  are noted.        Impression:          1. Interval removal of ET tube and NG tube  2. Bilateral basilar areas of atelectasis which appears stable  3. [place]-Prabhakar catheter, mediastinal drain and left chest tube remain in  good position     Electronically Signed By-Delvin Cordova Jr. On:12/7/2019 7:38 AM  This report was finalized on [id number] by  Delvin Cordova Jr., .          Results for orders placed during the hospital encounter of 12/04/19   XR Chest 1 View    Narrative DATE OF EXAM:  12/9/2019 12:53 PM     PROCEDURE:  XR CHEST 1 VW-     INDICATIONS:  Post chest tube removal; R94.39-Abnormal result of other cardiovascular  function study; I20.9-Angina pectoris, unspecified;  I25.110-Atherosclerotic heart disease of native coronary artery with  unstable angina pectoris; I10-Essential (primary) hypertension       COMPARISON:  AP portable chest 12/08/2019.     TECHNIQUE:   Single radiographic view of the  chest was obtained.     FINDINGS:  Right internal jugular introducer sheath has been removed. Mediastinal  drain and left chest tube have been removed. No pneumothorax is visible.     There is stable mild cardiac enlargement with CABG. Mild central  vascular congestive changes are present. Linear bilateral perihilar and  bibasilar atelectatic changes persist. Atelectasis is greatest in the  left lower lobe. No definite pleural effusion or pneumothorax is seen.     The study is mildly motion degraded.       Impression    1. Mild bilateral perihilar and bibasilar atelectasis, similar to  yesterday's study.  2. Interval removal of mediastinal drain, right IJ introducer sheath and  left chest tube. No visible pneumothorax.     Electronically Signed By-Dr. Sayra Anand MD On:12/9/2019 1:06 PM  This report was finalized on [id number] by Dr. Sayra Anand MD.   XR Chest 1 View    Narrative DATE OF EXAM:  12/8/2019 5:51 AM     PROCEDURE:  XR CHEST 1 VW-     INDICATIONS:  Post-Op Heart Surgery; R94.39-Abnormal result of other cardiovascular  function study; I20.9-Angina pectoris, unspecified;  I25.110-Atherosclerotic heart disease of native coronary artery with  unstable angina pectoris; I10-Essential (primary) hypertension patient's  a 58-year-old male status post Opperman heart surgery 3 days ago.  History of smoking and high blood pressure     COMPARISON:  Study of 12/07/2019     TECHNIQUE:   Single radiographic AP view of the chest was obtained.     FINDINGS:  Today's portable erect study was obtained on 12/08/2019 at 5:50 AM.     The heart is enlarged. Mediastinal drain and left chest tube are present  a right internal jugular sheath is present. The [place]-Prabhakar catheter has  been withdrawn. Bilateral basilar areas of atelectasis are seen which  appears stable from yesterday's study. There has been no interval  worsening.        Impression    1. Interval removal of [place]-Prabhakar catheter  2. Right internal jugular  sheath, mediastinal drain and left chest tube  remain in good position  3. Bilateral atelectatic changes are seen which appears stable from  yesterday's study     Electronically Signed By-Delvin Cordova Jr. On:12/8/2019 8:11 AM  This report was finalized on [id number] by  Delvin Cordova Jr., .   XR Chest 1 View    Narrative DATE OF EXAM:  12/7/2019 5:52 AM     PROCEDURE:  XR CHEST 1 VW-     INDICATIONS:  Post-Op Heart Surgery; R94.39-Abnormal result of other cardiovascular  function study; I20.9-Angina pectoris, unspecified;  I25.110-Atherosclerotic heart disease of native coronary artery with  unstable angina pectoris; I10-Essential (primary) hypertension patient's  a 58-year-old male who presents with history of being postop cardiac  surgery, shortness of breath, high blood pressure. Today's postop day 1     COMPARISON:  Study of 12/06/2019     TECHNIQUE:   Single radiographic AP view of the chest was obtained.     FINDINGS:  Today's exam was obtained a portable erect position on 12/07/2019 at  5:54 AM     The ET tube and NG tube have been withdrawn. A right internal jugular  [place]-Prabhakar catheter is present with tip in the main pulmonary artery in  good position. The left-sided chest tube and left-sided mediastinal  drain are again seen bilateral basilar areas of atelectasis are present  which appears stable from yesterday's study. There is no evidence of  pneumothorax. The upper abdomen appears unremarkable. Changes of CABG  are noted.        Impression    1. Interval removal of ET tube and NG tube  2. Bilateral basilar areas of atelectasis which appears stable  3. [place]-Prabhakar catheter, mediastinal drain and left chest tube remain in  good position     Electronically Signed By-Delvin Cordova Jr. On:12/7/2019 7:38 AM  This report was finalized on [id number] by  Delvin Cordova Jr., .       Results for orders placed during the hospital encounter of 12/04/19   Duplex Vein Mapping Lower Extremity - Bilateral CAR    Narrative  · The right greater saphenous vein is patent and of adequate size in the   thigh.  · The left greater saphenous vein is patent and of adequate size in the   thigh.  · The left greater saphenous vein is patent and of adequate size in the   calf.  · The right greater saphenous vein is patent and of adequate size in the   calf.            ASSESSMENT / PLAN      Abnormal nuclear stress test    Angina pectoris (CMS/HCC)    Coronary artery disease involving native coronary artery of native heart with unstable angina pectoris (CMS/HCC)    Essential hypertension    Coronary artery disease    1. ARF/ZAIN/CRF/CKD STG 2------Nonoliguric. ARF/ZAIN resolved. PRBCs PRN to help renal perfusion. +ARF/ZAIN on top of what appears to be CRF/CKD STG 2, with a baseline serum creatinine of 1.1.  CRF/CKD STG 2 is likely secondary to HTN NS. +ARF/ZAIN is already better and likely was secondary to slight prerenal state with concomitant ACE-I use. RENAL US showed cortical thinning compatible with chronic medical kidney disease.  Keep off of ACE-I for now.   No NSAIDs or further IV dye.  D/C'd manda      2. HTN WITH CKD-------Avoid hypotension. BP okay. No ACE/ARB/DRI/diuretic for now     3. CAD S/P CATH S/P CABG-----per Cardiology and CT surgery     4. HYPERLIPIDEMIA------On Statin. TSH 19.08. CK ok. Synthroid     5. OA/DJD------No NSAIDs.       6. HYPOTHYROIDISM---Synthroid    7. ANEMIA-----H/H up s/p PRBCs PRN.     8. HYPERNATREMIA-------Resolved.      9. HYPOPHOSPHATEMIA------Replace with KPhos as neeeded.              Luis Goff MD  [facility]  [phone]  12/10/19  7:33 AM

## 2019-12-10 NOTE — SIGNIFICANT NOTE
12/10/19 1310   Discharge of Care   Discharge Mode wheel chair   Discharge Destination home   Discharged Accompanied by family member/friend   Discharge Teaching Done  Yes   Learning Method Explanation;Teach Back;Demonstration;Written Materials     Sister and patient provided discharge instructions. No questions at this time. Patient discharged with sister driving patient home @ 7006.

## 2019-12-10 NOTE — THERAPY TREATMENT NOTE
Acute Care - Occupational Therapy Treatment Note  [facility]     Patient Name: Duke Freire  : 1960  MRN: [id number]  Today's Date: 12/10/2019             Admit Date: 2019       ICD-10-CM ICD-9-CM   1. Acute renal failure, unspecified acute renal failure type (CMS/HCC) N17.9 584.9   2. Abnormal nuclear stress test R94.39 794.39   3. Angina pectoris (CMS/HCC) I20.9 413.9   4. Coronary artery disease involving native coronary artery of native heart with unstable angina pectoris (CMS/HCC) I25.110 414.01     411.1   5. Essential hypertension I10 401.9   6. S/P CABG x 2 Z95.1 V45.81     Patient Active Problem List   Diagnosis   • Abnormal nuclear stress test   • Angina pectoris (CMS/HCC)   • Coronary artery disease involving native coronary artery of native heart with unstable angina pectoris (CMS/HCC)   • Essential hypertension   • Coronary artery disease   • S/P CABG x 2     Past Medical History:   Diagnosis Date   • Coronary artery disease      Past Surgical History:   Procedure Laterality Date   • CARDIAC CATHETERIZATION Left 2019    Procedure: Left Heart Cath;  Surgeon: James Hernandez MD;  Location: [facility] CATH INVASIVE LOCATION;  Service: Cardiovascular   • CARDIAC CATHETERIZATION N/A 2019    Procedure: Coronary angiography;  Surgeon: James Hernandez MD;  Location: [facility] CATH INVASIVE LOCATION;  Service: Cardiovascular   • CORONARY ANGIOPLASTY WITH STENT PLACEMENT     • INNER EAR SURGERY  1985       Therapy Treatment    Rehabilitation Treatment Summary     Row Name 12/10/19 1100             Treatment Time/Intention    Discipline  occupational therapist  -ES      Document Type  therapy note (daily note)  -ES2      Mode of Treatment  individual therapy  -ES2      Patient Effort  excellent  -ES2      Comment  Eager to d/c home today. Appears to have good understanding of sternal precautions.  -ES2      Existing Precautions/Restrictions  sternal  -ES2      Recorded  by [ES] Omaira Holloway, OT 12/10/19 1256  [ES2] Omaira Holloway, OT 12/10/19 1259      Row Name 12/10/19 1100             Vital Signs    Pre Systolic BP Rehab  107  -ES      Pre Treatment Diastolic BP  63  -ES      Intra Systolic BP Rehab  107  -ES      Intra Treatment Diastolic BP  76  -ES      Pretreatment Heart Rate (beats/min)  87  -ES      Intratreatment Heart Rate (beats/min)  91  -ES2      Posttreatment Heart Rate (beats/min)  78  -ES2      Pre SpO2 (%)  99  -ES      O2 Delivery Pre Treatment  room air  -ES      Intra SpO2 (%)  96  -ES      O2 Delivery Intra Treatment  room air  -ES      Post SpO2 (%)  97  -ES      O2 Delivery Post Treatment  room air  -ES      Pre Patient Position  Sitting  -ES      Intra Patient Position  Standing  -ES      Post Patient Position  Sitting  -ES      Recorded by [ES] Omaira Holloway, OT 12/10/19 1107  [ES2] Omaira Holloway, OT 12/10/19 1259      Row Name 12/10/19 1100             Cognitive Assessment/Intervention- PT/OT    Orientation Status (Cognition)  oriented x 4  -ES      Recorded by [ES] Omaira Holloway, OT 12/10/19 1107      Row Name 12/10/19 1100             Functional Mobility    Functional Mobility- Ind. Level  conditional independence  -ES      Recorded by [ES] Omaira Holloway, OT 12/10/19 1259      Row Name 12/10/19 1100             Bed-Chair Transfer    Bed-Chair Tyler (Transfers)  conditional independence  -ES      Recorded by [ES] Omaira Holloway, OT 12/10/19 1259      Row Name 12/10/19 1100             Sit-Stand Transfer    Sit-Stand Tyler (Transfers)  conditional independence  -ES      Recorded by [ES] Omaira Holloway, OT 12/10/19 1259      Row Name 12/10/19 1100             Shower Transfer    Tyler Level (Shower Transfer)  conditional independence  -ES      Recorded by [ES] Omaira Holloway, OT 12/10/19 1259      Row Name 12/10/19 1100             Bathing Assessment/Intervention    Bathing Tyler Level   supervision  -ES      Recorded by [ES] Omaira Holloway, OT 12/10/19 1259      Row Name 12/10/19 1100             Upper Body Dressing Assessment/Training    Upper Body Dressing [place]y Level  conditional independence  -ES      Recorded by [ES] Omiara Holloway, OT 12/10/19 1259      Row Name 12/10/19 1100             Lower Body Dressing Assessment/Training    Lower Body Dressing [place]y Level  conditional independence  -ES      Recorded by [ES] Omaira Holloway, OT 12/10/19 1259      Row Name 12/10/19 1100             Self-Feeding Assessment/Training    [place] Level (Feeding)  conditional independence  -ES      Recorded by [ES] Omaira Holloway, OT 12/10/19 1259      Row Name 12/10/19 1100             Toileting Assessment/Training    [place]y Level (Toileting)  conditional independence  -ES      Recorded by [ES] Omaira Holloway, OT 12/10/19 1259      Row Name 12/10/19 1100             Static Sitting Balance    Level of Kened[place] (Unsupported Sitting, Static Balance)  independent  -ES      Recorded by [ES] Omaira Holloway, OT 12/10/19 1259      Row Name 12/10/19 1100             Dynamic Sitting Balance    Level of [place], Reaches Outside Midline (Sitting, Dynamic Balance)  conditional independence  -ES      Comment, Resists Mild Perturbations (Sitting, Dynamic Balance)  one verbal cue to maintain sternal precations  -ES      Recorded by [ES] Omaira Holloway, OT 12/10/19 1259      Row Name 12/10/19 1100             Static Standing Balance    Level of [place] (Supported Standing, Static Balance)  supervision  -ES      Recorded by [ES] Omaira Holloway, OT 12/10/19 1259      Row Name 12/10/19 1100             Dynamic Standing Balance    Level of [place], Reaches Outside Midline (Standing, Dynamic Balance)  supervision  -ES      Recorded by [ES] Omaira Holloway, OT 12/10/19 1259      Row Name 12/10/19 1100             Positioning and Restraints    Pre-Treatment  Position  sitting in chair/recliner  -ES      Post Treatment Position  chair  -ES      Recorded by [ES] Omaira Holloway, OT 12/10/19 1259      Row Name 12/10/19 1100             Pain Scale: Numbers Pre/Post-Treatment    Pain Scale: Numbers, Pretreatment  0/10 - no pain  -ES      Pain Scale: Numbers, Post-Treatment  0/10 - no pain  -ES      Recorded by [ES] Omaira Holloway, OT 12/10/19 1259      Row Name 12/10/19 1100             Health Promotion    Additional Documentation  Coping (Group)  -ES      Recorded by [ES] Omaira Holloway, OT 12/10/19 1259      Row Name                Wound 12/06/19 sternal Incision    Wound - Properties Group Date first assessed: 12/06/19 [MW] Present on Hospital Admission: N [MW] Location: sternal [MW] Primary Wound Type: Incision [MW] Recorded by:  [MW] Jason Moya, RN 12/06/19 1530    Row Name                Wound 12/06/19 Left leg Incision    Wound - Properties Group Date first assessed: 12/06/19 [MW] Side: Left [MW] Location: leg [MW] Primary Wound Type: Incision [MW] Recorded by:  [MW] Jason Moya, RN 12/06/19 1531    Row Name                Wound 12/06/19 1534 Other (See comments) torso Incision    Wound - Properties Group Date first assessed: 12/06/19 [MW] Time first assessed: 1534 [MW] Side: Other (See comments) [MW] Location: torso [MW] Primary Wound Type: Incision [MW] Recorded by:  [MW] Jason Moya, RN 12/06/19 1534    Row Name 12/10/19 1100             Coping    Observed Emotional State  accepting  -ES      Verbalized Emotional State  acceptance  -ES      Recorded by [ES] Omaira Holloway, OT 12/10/19 1259      Row Name 12/10/19 1100             Plan of Care Review    Plan of Care Reviewed With  patient  -ES      Progress  improving  -ES      Recorded by [ES] Omaira Holloway, OT 12/10/19 1259      Row Name 12/10/19 1100             Outcome Summary/Treatment Plan (OT)    Anticipated Discharge Disposition (OT)  home with assist  -ES      Recorded by  [ES] Omaira Holloway, OT 12/10/19 1259        User Key  (r) = Recorded By, (t) = Taken By, (c) = Cosigned By    Initials Name Effective Dates Discipline    Jason Rice RN 12/05/16 -  Nurse    Omaira Bautista, OT 03/01/19 -  OT        Wound 12/06/19 sternal Incision (Active)   Dressing Appearance open to air 12/10/2019 11:45 AM   Closure Approximated;Liquid skin adhesive 12/10/2019 11:45 AM   Base clean;dry 12/10/2019 11:45 AM   Drainage Amount none 12/10/2019  4:00 AM   Care, Wound cleansed with 12/10/2019  6:00 AM       Wound 12/06/19 Left leg Incision (Active)   Dressing Appearance open to air 12/10/2019 11:45 AM   Closure Approximated;Liquid skin adhesive 12/10/2019 11:45 AM   Base clean;dry 12/10/2019 11:45 AM   Drainage Amount none 12/10/2019  4:00 AM   Care, Wound cleansed with 12/10/2019  6:00 AM       Wound 12/06/19 1534 Other (See comments) torso Incision (Active)   Dressing Appearance intact;dry 12/10/2019 11:45 AM   Closure Approximated 12/10/2019 11:45 AM   Base dressing in place, unable to visualize 12/10/2019 11:45 AM   Drainage Amount small 12/9/2019  4:00 PM   Dressing Care, Wound border dressing 12/10/2019  4:00 AM     Rehab Goal Summary     Row Name 12/10/19 1034             Bed Mobility Goal 1 (PT)    Progress/Outcomes (Bed Mobility Goal 1, PT)  good progress toward goal;goal ongoing  -CM (r) EW (t) CM (c)         Transfer Goal 1 (PT)    Progress/Outcome (Transfer Goal 1, PT)  good progress toward goal;goal ongoing  -CM (r) EW (t) CM (c)         Gait Training Goal 1 (PT)    Progress/Outcome (Gait Training Goal 1, PT)  good progress toward goal;goal ongoing  -CM (r) EW (t) CM (c)         Stairs Goal 1 (PT)    Activity/Assistive Device (Stairs Goal 1, PT)  stairs, all skills  -CM (r) EW (t) CM (c)      Progress/Outcome (Stairs Goal 1, PT)  good progress toward goal;goal met  -CM (r) EW (t) CM (c)        User Key  (r) = Recorded By, (t) = Taken By, (c) = Cosigned By    Initials Name  Provider Type Discipline    CM Leyda Saleh, PT Physical Therapist PT    Sharon Dotson, PT Student PT Student PT            OT Recommendation and Plan  Outcome Summary/Treatment Plan (OT)  Anticipated Discharge Disposition (OT): home with assist  Plan of Care Review  Plan of Care Reviewed With: patient  Plan of Care Reviewed With: patient  Outcome Summary: Patient continues to do well, requiring 2L O2 with appropriate saturations this date. Pt requiring supervision for safety with ADLs including bathing this date. Pt appears safe to d/c home, but limited by living alone with 11-year old daughter intermittently at home. Patient demos good safety awareness, will continue to progress activity tolerance and attempt to wean O2.       Time Calculation:   Time Calculation- OT     Row Name 12/10/19 1301             Time Calculation- OT    OT Start Time  1058  -ES      OT Stop Time  1115  -ES      OT Time Calculation (min)  17 min  -ES      Total Timed Code Minutes- OT  17 minute(s)  -ES        User Key  (r) = Recorded By, (t) = Taken By, (c) = Cosigned By    Initials Name Provider Type    ES Omaira Holloway OT Occupational Therapist        Therapy Charges for Today     Code Description Service Date Service Provider Modifiers Qty    21250656183 HC OT SELF CARE/MGMT/TRAIN EA 15 MIN 12/9/2019 Omaira Holloway OT GO 1    80093584125 HC OT THERAPEUTIC ACT EA 15 MIN 12/9/2019 Omaira Holloway OT GO 1    26541954053 HC OT SELF CARE/MGMT/TRAIN EA 15 MIN 12/10/2019 Omaira Holloway OT GO 1    19290042511 HC OT THERAPEUTIC ACT EA 15 MIN 12/10/2019 Omaira Holloway OT GO 1               Omaira Holloway OT  12/10/2019

## 2019-12-10 NOTE — DISCHARGE INSTRUCTIONS
1) Keep the puncture site clean and dry.  You may remove the dressing tomorrow and replace it with a band-aid for at least one additional day.  Gently clean the site with mild soap and water.  No scrubbing/rubbing and lightly pat the area dry.  Showers are acceptable; however, avoid submerging in water (tub baths, hot tubs, swimming pools, dishwater, etc…) for at least one week.  The site should be completely healed before resuming these activities to reduce the risk of infection.  Check the site often.  Watch for signs and symptoms of infection and notify your physician if any of the following occur:  a. Bleeding or an increase in swelling at the puncture site  b. Fever  c. Increased soreness around puncture site  d. Foul odor or significant drainage from the puncture site  e. Swelling, redness, or warmth at the puncture site    **A bruise or small “pea sized” lump under the skin at the puncture site is not unusual.  This should disappear within 3-4 weeks.**  2) CONTACT YOUR PHYSICIAN OR CALL 911 IF YOU EXPERIENCE ANY OF THE FOLLOWING:  a. Increased angina (chest pain) or frequent sensations of pressure, burning, pain, or other discomfort in the chest, arm, jaws, or stomach  b. Lightheadedness, dizziness, faint feeling, sweating, or difficulty breathing  c. Odd sensation changes like numbness, tingling, coldness, or pain in the arm or leg in which the catheter was inserted  d. Limb in which the catheter was inserted becomes pale/bluish in color    IMPORTANT:  Although this occurs very rarely, if you should develop bright red or excessive bleeding, feel a “pop” inside at the insertion site, or notice a sudden increase in swelling larger than a walnut, you should call 911.  Hold continuous firm pressure to the access site until emergency personnel arrive.  It is best if someone else can do this for you.

## 2019-12-10 NOTE — PLAN OF CARE
Pt is POD4 CABG and has progressed to Mod I/ Supervision level with functional transfers and ADLs. He states 3/3 sternal precautions without verbal cuing and requires only one cue throughout ADLs to maintain. Pt eager to d/c home with intermittent assistance from sister, and states good understanding of EC/WS techniques for transition to home. Cont to rec assistance at home to ensure safety.

## 2019-12-10 NOTE — DISCHARGE SUMMARY
Date of Admission: 12/4/2019  Date of Discharge: 12/10/2019    Discharge Diagnosis:   MV CAD, s/p stenting, EF 58% (stress test)--POD#4 CABG  Expected postop SAL-- 1 PRBC 12/7  HTN  Dyslipidemia    Presenting Problem/History of Present Illness  Abnormal nuclear stress test [R94.39]  Angina pectoris (CMS/HCC) [I20.9]  Coronary artery disease involving native coronary artery of native heart with unstable angina pectoris (CMS/HCC) [I25.110]  Essential hypertension [I10]  Abnormal nuclear stress test [R94.39]  Coronary artery disease [I25.10]     Hospital Course  Patient is a 58 y.o. male presented for elective cardiac cath on 12/5/19. Cath revealed MV CAD. Nephrology was consulted for elevated creatinine. On 12/6/19 he underwent CABG X2 with Dr. Boyle. He was transferred to the recovery unit in stable condition and extubated shortly afterward. He experienced expected postop SAL and received 1 unit PRBC on pod #1. He recovered without further complication. He is being discharged to home with family in stable condition. He is being discharged on aspirin, statin, and beta blocker. Plavix was restarted prior to discharge per Dr. Boyle d/t prior stents. Prescription for Norco (#30) given at discharge.    Procedures Performed   Procedure(s):  12/6/19-- CORONARY ARTERY BYPASS GRAFTING       Consults:   Consults     Date and Time Order Name Status Description    12/6/2019 1733 Inpatient Cardiology Consult      12/5/2019 1441 Inpatient Nephrology Consult Completed     12/5/2019 1305 Inpatient Cardiothoracic Surgery Consult Completed           Pertinent Test Results:    Lab Results   Component Value Date    WBC 6.00 12/10/2019    HGB 8.1 (L) 12/10/2019    HCT 22.9 (L) 12/10/2019    MCV 95.4 12/10/2019     12/10/2019      Lab Results   Component Value Date    GLUCOSE 104 (H) 12/10/2019    CALCIUM 8.8 12/10/2019     12/10/2019    K 3.9 12/10/2019    CO2 28.0 12/10/2019    CL 99 12/10/2019    BUN 18 12/10/2019     CREATININE 0.86 12/10/2019    EGFRIFNONA 92 12/10/2019    BCR 20.9 12/10/2019    ANIONGAP 10.0 12/10/2019     Lab Results   Component Value Date    INR 1.30 (H) 12/06/2019    PROTIME 13.0 (H) 12/06/2019         Condition on Discharge: Stable for discharge to home with family    Vital Signs  Temp:  [97.4 °F (36.3 °C)-98.7 °F (37.1 °C)] 98 °F (36.7 °C)  Heart Rate:  [74-89] 82  Resp:  [14-16] 16  BP: ()/(48-78) 104/60      Discharge Disposition  Home or Self Care    Discharge Medications     Discharge Medications      New Medications      Instructions Start Date   ferrous sulfate 325 (65 FE) MG tablet   325 mg, Oral, Daily With Breakfast      furosemide 40 MG tablet  Commonly known as:  LASIX   40 mg, Oral, Daily      HYDROcodone-acetaminophen 5-325 MG per tablet  Commonly known as:  NORCO   1 tablet, Oral, Every 4 Hours PRN      levothyroxine 75 MCG tablet  Commonly known as:  SYNTHROID, LEVOTHROID   75 mcg, Oral, Daily      metoprolol tartrate 25 MG tablet  Commonly known as:  LOPRESSOR   12.5 mg, Oral, Every 12 Hours Scheduled      potassium chloride 10 MEQ CR capsule  Commonly known as:  MICRO-K   10 mEq, Oral, 2 Times Daily         Changes to Medications      Instructions Start Date   atorvastatin 40 MG tablet  Commonly known as:  LIPITOR  What changed:    · medication strength  · how much to take  · when to take this   40 mg, Oral, Nightly         Continue These Medications      Instructions Start Date   ASPIR-LOW 81 MG EC tablet  Generic drug:  aspirin   1 tablet, Oral, Every 24 Hours      clopidogrel 75 MG tablet  Commonly known as:  PLAVIX   75 mg, Oral, Daily      PROAIR  (90 Base) MCG/ACT inhaler  Generic drug:  albuterol sulfate HFA   1 puff, Inhalation, Daily PRN         Stop These Medications    amLODIPine 2.5 MG tablet  Commonly known as:  NORVASC     lisinopril 10 MG tablet  Commonly known as:  PRINIVIL,ZESTRIL     metoprolol succinate XL 25 MG 24 hr tablet  Commonly known as:  TOPROL-XL             Discharge Diet:   Healthy Heart Diet    Activity at Discharge:   As tolerated, no lifting > 10 pounds x 6 weeks    Follow-up Appointments  Future Appointments   Date Time Provider Department Center   12/18/2019  2:50 PM James Hernandez MD [facility]   1/20/2020 12:00 PM Laverne Boyle MD [facility] None     Additional Instructions for the Follow-ups that You Need to Schedule     Discharge Follow-up with PCP   As directed       Currently Documented PCP:    Sandra Cha PA    PCP Phone Number:    [phone]     Follow Up Details:  in one month         Discharge Follow-up with Specialty: Cardiology; 2 Weeks   As directed      Specialty:  Cardiology    Follow Up:  2 Weeks    Follow Up Details:  Follow up with cardiologist Dr. Leal in 2 weeks.         Discharge Follow-up with Specified Provider: Cardiac Surgery; 6 Weeks   As directed      To:  Cardiac Surgery    Follow Up:  6 Weeks    Follow Up Details:  Follow up with cardiac surgeon Dr. Laverne Boyle on 1/20/20 at 12pm.         Discharge Follow-up with Specified Provider: Nephrology; 1 Month   As directed      To:  Nephrology    Follow Up:  1 Month    Follow Up Details:  Follow up with nephrologist Dr. Goff in 1 month.         Call MD With Problems / Concerns   Dec 11, 2019      Instructions: Call for temp >101 or surgical wound drainage    Order Comments:  Instructions: Call for temp >101 or surgical wound drainage          Basic Metabolic Panel    Dec 17, 2019 (Approximate)            Test Results Pending at Discharge    STSinfomation:  Patient is being discharged on aspirin, statin, and beta blocker.       GULSHAN MELTON, APRN  12/10/19  1:24 PM    Acute kidney injury resolved.  Okay for discharge.

## 2019-12-10 NOTE — PLAN OF CARE
Problem: Patient Care Overview  Goal: Plan of Care Review  Outcome: Ongoing (interventions implemented as appropriate)  Flowsheets (Taken 12/10/2019 7673)  Outcome Summary: pt did well throughout night. no oxygen required. minimal chest incision pain. tylenol asked for r/t head and back pain. pt ambulated well around unit with standby assist.

## 2019-12-11 ENCOUNTER — READMISSION MANAGEMENT (OUTPATIENT)
Dept: CALL CENTER | Facility: HOSPITAL | Age: 59
End: 2019-12-11

## 2019-12-11 NOTE — OUTREACH NOTE
Prep Survey      Responses   Facility patient discharged from?  [facility]   Is patient eligible?  Yes   Discharge diagnosis  CABG, MV CAD, s/p stenting, HTN,    Does the patient have one of the following disease processes/diagnoses(primary or secondary)?  Cardiothoracic surgery   Does the patient have Home health ordered?  No   Is there a DME ordered?  No   Medication alerts for this patient  See AVS   Prep survey completed?  Yes          Preethi Johnson LPN

## 2019-12-12 ENCOUNTER — READMISSION MANAGEMENT (OUTPATIENT)
Dept: CALL CENTER | Facility: HOSPITAL | Age: 59
End: 2019-12-12

## 2019-12-12 NOTE — OUTREACH NOTE
CT Surgery Week 1 Survey      Responses   Facility patient discharged from?  [facility]   Does the patient have one of the following disease processes/diagnoses(primary or secondary)?  Cardiothoracic surgery   Is there a successful TCM telephone encounter documented?  No   Week 1 attempt successful?  Yes   Call start time  1310   Call end time  1335   Discharge diagnosis  CABG, MV CAD, s/p stenting, HTN,    Meds reviewed with patient/caregiver?  Yes   Is the patient having any side effects they believe may be caused by any medication additions or changes?  No   Does the patient have all medications related to this admission filled (includes all antibiotics, pain medications, cardiac medications, etc.)  Yes   Is the patient taking all medications as directed (includes completed medication regime)?  Yes   Does the patient have a primary care provider?   Yes   Does the patient have an appointment scheduled with their C/T surgeon?  Yes   Comments regarding PCP  PATIENT REQUESTS ASSISTANCE MAKING FOLLOW UP APPOINTMENTS WITH PCP, DR. LAWTON, AND DR. GARCIA. THIS NURSE MADE ALL FOLLOW UP APPOINTMENTS, CALLED PAITENT WITH DATES AND TIMES, AND PATIENT REQUESTED DATES, TIMES AND LOCATIONS BE EMAILED. ALL APPOINTMENT INFORMATION EMAILED TO PATIENT.    Has the patient kept scheduled appointments due by today?  N/A   Has home health visited the patient within 72 hours of discharge?  N/A   Did the patient receive a copy of their discharge instructions?  Yes   Nursing interventions  Reviewed instructions with patient   What is the patient's perception of their health status since discharge?  Improving   Nursing interventions  Nurse provided patient education   Is the patient/caregiver able to teach back normal signs of recovery?  Nausea and lack of appetite, Depression or irritability, Constipation, Pain or discomfort at incisional site   Is the patient /caregiver able to teach back basic post-op care?  Continue use of incentive  spirometry at least 1 week post discharge, Practice 'cough and deep breath', Drive as instructed by MD in discharge instructions, Take showers only when approved by MD-sponge bathe until then, No tub bath, swimming, or hot tub until instructed by MD, Keep incision areas clean, dry and protected, Do not remove steri-strips, Lifting as instructed by MD in discharge instructions   Is the patient/caregiver able to teach back signs and symptoms of incisional infection?  Increased redness, swelling or pain at the incisonal site, Increased drainage or bleeding, Incisional warmth, Pus or odor from incision, Fever   Is the patient/caregiver able to teach back steps to recovery at home?  Set small, achievable goals for return to baseline health, Rest and rebuild strength, gradually increase activity, Make a list of questions for surgeon's appointment   Nursing interventions  Provided education on importance of cardiac rehab   Is the patient/caregiver able to teach back the hierarchy of who to call/visit for symptoms/problems? PCP, Specialist, Home health nurse, Urgent Care, ED, 911  Yes   Week 1 call completed?  Yes   Revoked  No further contact(revokes)-requires comment            Donna Mckee LPN

## 2019-12-13 ENCOUNTER — TELEPHONE (OUTPATIENT)
Dept: CARDIOLOGY | Facility: CLINIC | Age: 59
End: 2019-12-13

## 2019-12-13 RX ORDER — FUROSEMIDE 40 MG/1
40 TABLET ORAL DAILY
Qty: 5 TABLET | Refills: 0 | Status: SHIPPED | OUTPATIENT
Start: 2019-12-13 | End: 2019-12-18 | Stop reason: SDUPTHER

## 2019-12-13 NOTE — TELEPHONE ENCOUNTER
REQUESTING REFILL ON FUROSEMIDE 40 MG. ONLY HAS TWO PILLS LEFT. [facility].

## 2019-12-13 NOTE — TELEPHONE ENCOUNTER
Went ahead and refilled patient's RX for Furosemide 40mg #5    Wanted to double check with you to ensure if patient needs to continue taking this medication or if it was only temporary for post-operative care.

## 2019-12-17 ENCOUNTER — TELEPHONE (OUTPATIENT)
Dept: CARDIOLOGY | Facility: CLINIC | Age: 59
End: 2019-12-17

## 2019-12-17 RX ORDER — AMLODIPINE BESYLATE 5 MG/1
1 TABLET ORAL EVERY 24 HOURS
COMMUNITY
Start: 2019-11-27 | End: 2020-03-20 | Stop reason: SDUPTHER

## 2019-12-17 NOTE — PROGRESS NOTES
Subjective:     Encounter Date:12/18/2019      Patient ID: Duke Freire is a 58 y.o. male.    Chief Complaint: S/P CABG x 2 vessels  History of Present Illness  58-year-old white male patient who recently underwent stress Myoview January 2019 which showed fixated large inferoapical defect,  cannot rule out large ischemia   patient has strong family history of CAD    patient does have history of hypertension and dyslipidemia        Patient underwent cardiac catheterization which showed severe mid LAD disease and ostial diagonal artery disease both were stented   in limited views ostial to proximal LAD has 60-70% disease but in repeat pictures it was thought it is around 40-50%  Patient had some noncompliance with medications started having recurrent symptoms of chest pain  So patient underwent stress Myoview December 2019 which showed ischemia in the anterior wall and apex  Patient underwent repeat cardiac catheterization December 2019 showed diagonal artery stent has ostial disease 80% mid LAD stent was open severe obstructive coronary artery disease involving the proximal LAD at the bifurcation of the diagonal artery  Patient underwent two-vessel CABG  Postop he is doing well incision is healing well  Follow-up in 3 months with EKG         Past Medical History:   Diagnosis Date   • Coronary artery disease    • Dyslipidemia    • Essential hypertension      Past Surgical History:   Procedure Laterality Date   • CARDIAC CATHETERIZATION Left 12/4/2019    Procedure: Left Heart Cath;  Surgeon: James Hernandez MD;  Location:  [facility];  Service: Cardiovascular   • CARDIAC CATHETERIZATION N/A 12/4/2019    Procedure: Coronary angiography;  Surgeon: James Hernandez MD;  Location:  [facility];  Service: Cardiovascular   • CORONARY ANGIOPLASTY WITH STENT PLACEMENT      KRISTEN: mid LAD & Ostial diagonal   • CORONARY ARTERY BYPASS GRAFT  12/06/2019    2 Vessels:  "LIMA to distal LAD   • CORONARY ARTERY BYPASS GRAFT N/A 12/6/2019    Procedure: CORONARY ARTERY BYPASS GRAFTING;  Surgeon: Laverne Boyle MD;  Location: [facility];  Service: Cardiothoracic   • INNER EAR SURGERY  1985     /80 (BP Location: Left arm, Patient Position: Sitting, Cuff Size: Adult)   Pulse 86   Ht 177.8 cm (70\")   Wt 83 kg (183 lb)   SpO2 98%   BMI 26.26 kg/m²   Family History   Problem Relation Age of Onset   • Diabetes Mother    • Diabetes Sister    • No Known Problems Father    • No Known Problems Brother    • No Known Problems Maternal Aunt    • No Known Problems Maternal Uncle    • No Known Problems Paternal Aunt    • No Known Problems Paternal Uncle    • No Known Problems Maternal Grandmother    • No Known Problems Maternal Grandfather    • No Known Problems Paternal Grandmother    • No Known Problems Paternal Grandfather    • No Known Problems Other    • Anemia Neg Hx    • Arrhythmia Neg Hx    • Asthma Neg Hx    • Clotting disorder Neg Hx    • Fainting Neg Hx    • Heart attack Neg Hx    • Heart disease Neg Hx    • Heart failure Neg Hx    • Hyperlipidemia Neg Hx    • Hypertension Neg Hx        Current Outpatient Medications:   •  albuterol sulfate HFA (PROAIR HFA) 108 (90 Base) MCG/ACT inhaler, Inhale 1 puff Daily As Needed., Disp: , Rfl:   •  amLODIPine (NORVASC) 5 MG tablet, Take 1 tablet by mouth Daily., Disp: , Rfl:   •  aspirin (ASPIR-LOW) 81 MG EC tablet, Take 1 tablet by mouth Daily., Disp: , Rfl:   •  atorvastatin (LIPITOR) 40 MG tablet, Take 1 tablet by mouth Every Night., Disp: 30 tablet, Rfl: 3  •  clopidogrel (PLAVIX) 75 MG tablet, Take 1 tablet by mouth Daily., Disp: 90 tablet, Rfl: 3  •  ferrous sulfate 325 (65 FE) MG tablet, Take 1 tablet by mouth Daily With Breakfast., Disp: 30 tablet, Rfl: 0  •  furosemide (LASIX) 40 MG tablet, Take 1 tablet by mouth Daily., Disp: 5 tablet, Rfl: 0  •  levothyroxine (SYNTHROID, LEVOTHROID) 75 MCG tablet, Take 1 tablet by mouth " Daily., Disp: 30 tablet, Rfl: 3  •  metoprolol tartrate (LOPRESSOR) 25 MG tablet, Take 0.5 tablets by mouth Every 12 (Twelve) Hours., Disp: 30 tablet, Rfl: 3  •  potassium chloride (K-DUR) 10 MEQ CR tablet, Take 1 tablet by mouth 2 (Two) Times a Day., Disp: 60 tablet, Rfl: 2  Social History     Socioeconomic History   • Marital status:      Spouse name: Not on file   • Number of children: Not on file   • Years of education: Not on file   • Highest education level: Not on file   Tobacco Use   • Smoking status: Never Smoker   • Smokeless tobacco: Never Used   Substance and Sexual Activity   • Alcohol use: Not Currently   • Drug use: Never   • Sexual activity: Defer     No Known Allergies  Review of Systems   Constitution: Negative for fever and malaise/fatigue.   HENT: Negative for congestion and hearing loss.    Eyes: Negative for double vision and visual disturbance.   Cardiovascular: Negative for chest pain, claudication, dyspnea on exertion, leg swelling and syncope.   Respiratory: Negative for cough and shortness of breath.    Endocrine: Negative for cold intolerance.   Skin: Negative for color change and rash.   Musculoskeletal: Negative for arthritis and joint pain.   Gastrointestinal: Negative for abdominal pain and heartburn.   Genitourinary: Negative for hematuria.   Neurological: Negative for excessive daytime sleepiness and dizziness.   Psychiatric/Behavioral: Negative for depression. The patient is not nervous/anxious.    All other systems reviewed and are negative.             Objective:     Physical Exam   Constitutional: He is oriented to person, place, and time. He appears well-developed and well-nourished. He is cooperative.   HENT:   Head: Normocephalic and atraumatic.   Mouth/Throat: Uvula is midline and oropharynx is clear and moist. No oral lesions.   Eyes: Conjunctivae are normal. No scleral icterus.   Neck: Trachea normal. Neck supple. No JVD present. Carotid bruit is not present. No  thyromegaly present.   Cardiovascular: Normal rate, regular rhythm, S1 normal, S2 normal, normal heart sounds, intact distal pulses and normal pulses. PMI is not displaced. Exam reveals no gallop and no friction rub.   No murmur heard.  Pulmonary/Chest: Effort normal and breath sounds normal.   Abdominal: Soft. Bowel sounds are normal.   Musculoskeletal: Normal range of motion. He exhibits no edema.   Neurological: He is alert and oriented to person, place, and time. He has normal strength.   No focal deficits   Skin: Skin is warm. No cyanosis.   Incision healing well   Psychiatric: He has a normal mood and affect.       Procedures    Lab Review:       Assessment:          Diagnosis Plan   1. Coronary artery disease involving native coronary artery of native heart with unstable angina pectoris (CMS/HCC)  CK    Comprehensive Metabolic Panel    Lipid Panel   2. Essential hypertension  CK    Comprehensive Metabolic Panel    Lipid Panel   3. S/P CABG x 2  CK    Comprehensive Metabolic Panel    Lipid Panel          Plan:       CAD status post PCI followed by CABG stable  Continue aggressive control of hypertension dyslipidemia  Follow-up in 3 months

## 2019-12-17 NOTE — TELEPHONE ENCOUNTER
Ricardo Sheriff from [facility] called and left a message to return call in regards to RTW date of Dec. 13th.     [phone]    Called, left voice mail to return my call.      Ricardo returned call.     He is requesting the office note from tomorrow be faxed to attn Ricardo Sheriff to [phone].

## 2019-12-18 ENCOUNTER — OFFICE VISIT (OUTPATIENT)
Dept: CARDIOLOGY | Facility: CLINIC | Age: 59
End: 2019-12-18

## 2019-12-18 VITALS
HEART RATE: 86 BPM | OXYGEN SATURATION: 98 % | SYSTOLIC BLOOD PRESSURE: 104 MMHG | WEIGHT: 183 LBS | BODY MASS INDEX: 26.2 KG/M2 | HEIGHT: 70 IN | DIASTOLIC BLOOD PRESSURE: 80 MMHG

## 2019-12-18 DIAGNOSIS — I25.110 CORONARY ARTERY DISEASE INVOLVING NATIVE CORONARY ARTERY OF NATIVE HEART WITH UNSTABLE ANGINA PECTORIS (HCC): Primary | ICD-10-CM

## 2019-12-18 DIAGNOSIS — Z95.1 S/P CABG X 2: ICD-10-CM

## 2019-12-18 DIAGNOSIS — I10 ESSENTIAL HYPERTENSION: ICD-10-CM

## 2019-12-18 PROCEDURE — 99213 OFFICE O/P EST LOW 20 MIN: CPT | Performed by: INTERNAL MEDICINE

## 2019-12-18 RX ORDER — POTASSIUM CHLORIDE 750 MG/1
10 TABLET, FILM COATED, EXTENDED RELEASE ORAL 2 TIMES DAILY
Qty: 60 TABLET | Refills: 2 | Status: SHIPPED | OUTPATIENT
Start: 2019-12-18 | End: 2020-03-20 | Stop reason: SDUPTHER

## 2019-12-18 RX ORDER — POTASSIUM CHLORIDE 750 MG/1
10 TABLET, FILM COATED, EXTENDED RELEASE ORAL 2 TIMES DAILY
COMMUNITY
End: 2019-12-18 | Stop reason: SDUPTHER

## 2019-12-18 RX ORDER — FUROSEMIDE 40 MG/1
40 TABLET ORAL DAILY
Qty: 5 TABLET | Refills: 0 | Status: SHIPPED | OUTPATIENT
Start: 2019-12-18 | End: 2020-03-20 | Stop reason: SDUPTHER

## 2019-12-31 ENCOUNTER — TELEPHONE (OUTPATIENT)
Dept: CARDIOLOGY | Facility: CLINIC | Age: 59
End: 2019-12-31

## 2019-12-31 DIAGNOSIS — Z95.1 S/P CABG (CORONARY ARTERY BYPASS GRAFT): ICD-10-CM

## 2019-12-31 DIAGNOSIS — I25.10 CORONARY ARTERY DISEASE INVOLVING NATIVE CORONARY ARTERY OF NATIVE HEART WITHOUT ANGINA PECTORIS: Primary | ICD-10-CM

## 2020-01-06 ENCOUNTER — TELEPHONE (OUTPATIENT)
Dept: CARDIAC REHAB | Facility: HOSPITAL | Age: 60
End: 2020-01-06

## 2020-01-07 ENCOUNTER — OFFICE VISIT (OUTPATIENT)
Dept: FAMILY MEDICINE CLINIC | Facility: CLINIC | Age: 60
End: 2020-01-07

## 2020-01-07 ENCOUNTER — LAB (OUTPATIENT)
Dept: LAB | Facility: HOSPITAL | Age: 60
End: 2020-01-07

## 2020-01-07 VITALS
SYSTOLIC BLOOD PRESSURE: 131 MMHG | OXYGEN SATURATION: 100 % | TEMPERATURE: 97.4 F | DIASTOLIC BLOOD PRESSURE: 85 MMHG | HEART RATE: 81 BPM | BODY MASS INDEX: 27.69 KG/M2 | WEIGHT: 193 LBS

## 2020-01-07 DIAGNOSIS — Z95.1 S/P CABG X 2: Primary | ICD-10-CM

## 2020-01-07 DIAGNOSIS — E78.2 MIXED HYPERLIPIDEMIA: ICD-10-CM

## 2020-01-07 DIAGNOSIS — Z12.11 COLON CANCER SCREENING: ICD-10-CM

## 2020-01-07 DIAGNOSIS — N17.9 ACUTE RENAL FAILURE, UNSPECIFIED ACUTE RENAL FAILURE TYPE (HCC): ICD-10-CM

## 2020-01-07 DIAGNOSIS — I10 ESSENTIAL HYPERTENSION: ICD-10-CM

## 2020-01-07 PROBLEM — I25.10 CORONARY HEART DISEASE: Status: ACTIVE | Noted: 2019-02-11

## 2020-01-07 PROBLEM — R94.30 ABNORMAL RESULT OF CARDIOVASCULAR FUNCTION STUDY: Status: ACTIVE | Noted: 2019-01-23

## 2020-01-07 LAB
ANION GAP SERPL CALCULATED.3IONS-SCNC: 9.7 MMOL/L (ref 5–15)
BUN BLD-MCNC: 11 MG/DL (ref 6–20)
BUN/CREAT SERPL: 11.3 (ref 7–25)
CALCIUM SPEC-SCNC: 9.3 MG/DL (ref 8.6–10.5)
CHLORIDE SERPL-SCNC: 103 MMOL/L (ref 98–107)
CO2 SERPL-SCNC: 25.3 MMOL/L (ref 22–29)
CREAT BLD-MCNC: 0.97 MG/DL (ref 0.76–1.27)
GFR SERPL CREATININE-BSD FRML MDRD: 79 ML/MIN/1.73
GLUCOSE BLD-MCNC: 92 MG/DL (ref 65–99)
POTASSIUM BLD-SCNC: 4.9 MMOL/L (ref 3.5–5.2)
SODIUM BLD-SCNC: 138 MMOL/L (ref 136–145)

## 2020-01-07 PROCEDURE — 99213 OFFICE O/P EST LOW 20 MIN: CPT | Performed by: PHYSICIAN ASSISTANT

## 2020-01-07 PROCEDURE — 80048 BASIC METABOLIC PNL TOTAL CA: CPT

## 2020-01-07 PROCEDURE — 36415 COLL VENOUS BLD VENIPUNCTURE: CPT

## 2020-01-07 NOTE — PROGRESS NOTES
Subjective  Duke Freire is a 59 y.o. male     History of Present Illness  Patient is a 59-year-old white male here for follow-up from the hospital where he was seen from December 4, 2019 to December 10, 2019 for elective Cardiac catheterization with subsequent double bypass.  His cardiologist is Dr. Leal.  He was started on multiple new cardiac medications and states he is taking them as directed.  Hospital records were reviewed and reconciled with patient, medications were reconciled as well.    Since his discharge patient states he has been doing very well, he is feeling better overall, he is still fatigued and tired often however.  He has an appointment to follow-up with nephrology this Friday and is awaiting in order to start doing cardiac rehab.    The following portions of the patient's history were reviewed and updated as appropriate: allergies, current medications, past family history, past medical history, past social history, past surgical history and problem list.    Review of Systems   Constitutional: Negative for fever.   HENT: Negative for sore throat.    Eyes: Negative for blurred vision and pain.   Respiratory: Negative for shortness of breath.    Cardiovascular: Negative for chest pain.   Gastrointestinal: Negative for abdominal pain and blood in stool.   Musculoskeletal: Negative for joint swelling.   Neurological: Negative for seizures and confusion.   Psychiatric/Behavioral: Negative for depressed mood.       Objective  Physical Exam   Constitutional: He is oriented to person, place, and time. He appears well-developed and well-nourished.   HENT:   Head: Normocephalic and atraumatic.   Right Ear: External ear normal.   Left Ear: External ear normal.   Nose: Nose normal.   Mouth/Throat: Oropharynx is clear and moist.   Eyes: Pupils are equal, round, and reactive to light. Conjunctivae are normal.   Neck: Normal range of motion. Neck supple.   Cardiovascular: Normal rate, regular rhythm and  normal heart sounds.   Pulmonary/Chest: Effort normal and breath sounds normal.   Abdominal: Soft. Bowel sounds are normal.   Musculoskeletal: Normal range of motion.   Neurological: He is alert and oriented to person, place, and time.   Skin:        Psychiatric: He has a normal mood and affect. His behavior is normal.       Vitals:    01/07/20 1343 01/07/20 1423   BP: 143/90 131/85   BP Location: Right arm    Patient Position: Sitting    Cuff Size: Adult    Pulse: 81    Temp: 97.4 °F (36.3 °C)    TempSrc: Oral    SpO2: 100%    Weight: 87.5 kg (193 lb)      Body mass index is 27.69 kg/m².    PHQ-9 Total Score: 0      Assessment/Plan  Diagnoses and all orders for this visit:    1. S/P CABG x 2 (Primary)  Comments:  Improved, patient is doing much better.  He is to continue current medications.  He is to continue following up with cardiology as directed by them and nephrology as directed.    2. Essential hypertension  Comments:  Patient's blood pressure was a bit high today, on recheck it improved.  He is to monitor and call if consistently elevated.  Will adjust medications at that time if needed.    3. Mixed hyperlipidemia  Comments:  Stable, patient to continue current medications.    4. Colon cancer screening  Comments:  Patient is due for colonoscopy, ordered it.  Orders:  -     Ambulatory Referral For Screening Colonoscopy

## 2020-01-09 ENCOUNTER — TELEPHONE (OUTPATIENT)
Dept: CARDIAC REHAB | Facility: HOSPITAL | Age: 60
End: 2020-01-09

## 2020-01-13 ENCOUNTER — TELEPHONE (OUTPATIENT)
Dept: CARDIAC REHAB | Facility: HOSPITAL | Age: 60
End: 2020-01-13

## 2020-01-13 RX ORDER — FERROUS SULFATE 325(65) MG
TABLET ORAL
Qty: 30 TABLET | Refills: 0 | OUTPATIENT
Start: 2020-01-13

## 2020-01-16 ENCOUNTER — TELEPHONE (OUTPATIENT)
Dept: CARDIAC REHAB | Facility: HOSPITAL | Age: 60
End: 2020-01-16

## 2020-01-20 ENCOUNTER — OFFICE VISIT (OUTPATIENT)
Dept: CARDIAC SURGERY | Facility: CLINIC | Age: 60
End: 2020-01-20

## 2020-01-20 VITALS
WEIGHT: 191.4 LBS | SYSTOLIC BLOOD PRESSURE: 118 MMHG | HEART RATE: 105 BPM | HEIGHT: 70 IN | OXYGEN SATURATION: 95 % | DIASTOLIC BLOOD PRESSURE: 85 MMHG | BODY MASS INDEX: 27.4 KG/M2

## 2020-01-20 DIAGNOSIS — Z95.1 S/P CABG (CORONARY ARTERY BYPASS GRAFT): Primary | ICD-10-CM

## 2020-01-20 PROCEDURE — 99024 POSTOP FOLLOW-UP VISIT: CPT | Performed by: THORACIC SURGERY (CARDIOTHORACIC VASCULAR SURGERY)

## 2020-01-26 NOTE — PROGRESS NOTES
Duke Freire is a 59 y.o. male s/p a CABGx2 on 12/6/2019. He denies any signs or symptoms of myocardial ischemia, cerebrovascular event, or infection. He reports good exercise tolerance.    Sternum is stable, incision is clean, dry, and intact.   CTA B/L.   Lower extremity incisions are clean, dry and intact.  GCS 15, no neurologic deficit.    He appears to be doing well. 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 He should continue sternal precautions for now; I would like to reassess him in two months before he returns to work.

## 2020-01-28 ENCOUNTER — APPOINTMENT (OUTPATIENT)
Dept: CARDIAC REHAB | Facility: HOSPITAL | Age: 60
End: 2020-01-28

## 2020-03-18 ENCOUNTER — LAB (OUTPATIENT)
Dept: LAB | Facility: HOSPITAL | Age: 60
End: 2020-03-18

## 2020-03-18 DIAGNOSIS — I10 ESSENTIAL HYPERTENSION: ICD-10-CM

## 2020-03-18 DIAGNOSIS — I25.110 CORONARY ARTERY DISEASE INVOLVING NATIVE CORONARY ARTERY OF NATIVE HEART WITH UNSTABLE ANGINA PECTORIS (HCC): ICD-10-CM

## 2020-03-18 DIAGNOSIS — Z95.1 S/P CABG X 2: ICD-10-CM

## 2020-03-18 LAB
ALBUMIN SERPL-MCNC: 4.2 G/DL (ref 3.5–5.2)
ALBUMIN/GLOB SERPL: 1.6 G/DL
ALP SERPL-CCNC: 152 U/L (ref 39–117)
ALT SERPL W P-5'-P-CCNC: 36 U/L (ref 1–41)
ANION GAP SERPL CALCULATED.3IONS-SCNC: 12.3 MMOL/L (ref 5–15)
AST SERPL-CCNC: 25 U/L (ref 1–40)
BILIRUB SERPL-MCNC: 0.7 MG/DL (ref 0.2–1.2)
BUN BLD-MCNC: 15 MG/DL (ref 6–20)
BUN/CREAT SERPL: 14.4 (ref 7–25)
CALCIUM SPEC-SCNC: 9.3 MG/DL (ref 8.6–10.5)
CHLORIDE SERPL-SCNC: 101 MMOL/L (ref 98–107)
CHOLEST SERPL-MCNC: 125 MG/DL (ref 0–200)
CK SERPL-CCNC: 74 U/L (ref 20–200)
CO2 SERPL-SCNC: 26.7 MMOL/L (ref 22–29)
CREAT BLD-MCNC: 1.04 MG/DL (ref 0.76–1.27)
GFR SERPL CREATININE-BSD FRML MDRD: 73 ML/MIN/1.73
GLOBULIN UR ELPH-MCNC: 2.7 GM/DL
GLUCOSE BLD-MCNC: 126 MG/DL (ref 65–99)
HDLC SERPL-MCNC: 29 MG/DL (ref 40–60)
LDLC SERPL CALC-MCNC: 64 MG/DL (ref 0–100)
LDLC/HDLC SERPL: 2.19 {RATIO}
POTASSIUM BLD-SCNC: 3.9 MMOL/L (ref 3.5–5.2)
PROT SERPL-MCNC: 6.9 G/DL (ref 6–8.5)
SODIUM BLD-SCNC: 140 MMOL/L (ref 136–145)
TRIGL SERPL-MCNC: 162 MG/DL (ref 0–150)
VLDLC SERPL-MCNC: 32.4 MG/DL (ref 5–40)

## 2020-03-18 PROCEDURE — 82550 ASSAY OF CK (CPK): CPT

## 2020-03-18 PROCEDURE — 80053 COMPREHEN METABOLIC PANEL: CPT

## 2020-03-18 PROCEDURE — 80061 LIPID PANEL: CPT

## 2020-03-18 PROCEDURE — 36415 COLL VENOUS BLD VENIPUNCTURE: CPT

## 2020-03-20 ENCOUNTER — OFFICE VISIT (OUTPATIENT)
Dept: CARDIOLOGY | Facility: CLINIC | Age: 60
End: 2020-03-20

## 2020-03-20 VITALS
OXYGEN SATURATION: 98 % | HEIGHT: 70 IN | SYSTOLIC BLOOD PRESSURE: 125 MMHG | WEIGHT: 200 LBS | DIASTOLIC BLOOD PRESSURE: 87 MMHG | BODY MASS INDEX: 28.63 KG/M2 | HEART RATE: 74 BPM

## 2020-03-20 DIAGNOSIS — I25.10 CORONARY ARTERY DISEASE INVOLVING NATIVE CORONARY ARTERY OF NATIVE HEART WITHOUT ANGINA PECTORIS: ICD-10-CM

## 2020-03-20 DIAGNOSIS — Z95.1 S/P CABG X 2: ICD-10-CM

## 2020-03-20 DIAGNOSIS — I10 ESSENTIAL HYPERTENSION: ICD-10-CM

## 2020-03-20 DIAGNOSIS — I25.110 CORONARY ARTERY DISEASE INVOLVING NATIVE CORONARY ARTERY OF NATIVE HEART WITH UNSTABLE ANGINA PECTORIS (HCC): Primary | ICD-10-CM

## 2020-03-20 DIAGNOSIS — E78.2 MIXED HYPERLIPIDEMIA: ICD-10-CM

## 2020-03-20 PROCEDURE — 99213 OFFICE O/P EST LOW 20 MIN: CPT | Performed by: INTERNAL MEDICINE

## 2020-03-20 PROCEDURE — 93000 ELECTROCARDIOGRAM COMPLETE: CPT | Performed by: INTERNAL MEDICINE

## 2020-03-20 RX ORDER — ATORVASTATIN CALCIUM 40 MG/1
40 TABLET, FILM COATED ORAL NIGHTLY
Qty: 90 TABLET | Refills: 3 | Status: SHIPPED | OUTPATIENT
Start: 2020-03-20

## 2020-03-20 RX ORDER — AMLODIPINE BESYLATE 5 MG/1
5 TABLET ORAL EVERY 24 HOURS
Qty: 90 TABLET | Refills: 3 | Status: SHIPPED | OUTPATIENT
Start: 2020-03-20

## 2020-03-20 RX ORDER — POTASSIUM CHLORIDE 750 MG/1
10 TABLET, FILM COATED, EXTENDED RELEASE ORAL 2 TIMES DAILY
Qty: 180 TABLET | Refills: 2 | Status: SHIPPED | OUTPATIENT
Start: 2020-03-20

## 2020-03-20 RX ORDER — CLOPIDOGREL BISULFATE 75 MG/1
75 TABLET ORAL DAILY
Qty: 90 TABLET | Refills: 3 | Status: SHIPPED | OUTPATIENT
Start: 2020-03-20

## 2020-03-20 RX ORDER — FUROSEMIDE 40 MG/1
40 TABLET ORAL DAILY
Qty: 90 TABLET | Refills: 2 | Status: SHIPPED | OUTPATIENT
Start: 2020-03-20

## 2020-03-20 NOTE — PROGRESS NOTES
Subjective:     Encounter Date:03/20/2020      Patient ID: Duke Freire is a 59 y.o. male.    Chief Complaint: follow up on CAD & HTN w labs  Coronary Artery Disease   Symptoms include chest pain (twinge) and shortness of breath. Pertinent negatives include no dizziness, leg swelling or palpitations.        58-year-old white male patient who recently underwent stress Myoview January 2019 which showed fixated large inferoapical defect,  cannot rule out large ischemia   patient has strong family history of CAD    patient does have history of hypertension and dyslipidemia        Patient underwent cardiac catheterization which showed severe mid LAD disease and ostial diagonal artery disease both were stented   in limited views ostial to proximal LAD has 60-70% disease but in repeat pictures it was thought it is around 40-50%  Patient had some noncompliance with medications started having recurrent symptoms of chest pain  So patient underwent stress Myoview December 2019 which showed ischemia in the anterior wall and apex  Patient underwent repeat cardiac catheterization December 2019 showed diagonal artery stent has ostial disease 80% mid LAD stent was open severe obstructive coronary artery disease involving the proximal LAD at the bifurcation of the diagonal artery  Patient underwent two-vessel CABG  Postop he is doing well incision is healing well    Patient comes back for follow-up EKG today normal sinus rhythm borderline IVCD normal axis compared to the last EKG no significant changes noted    Patient was advised to monitor cardiac risk factors aggressively recent labs were reviewed we will follow-up in 6 months with labs    The following portions of the patient's history were reviewed and updated as appropriate: Allergies current medications past family history past medical history past social history past surgical history problem list and review of systems  Past Medical History:   Diagnosis Date   •  "Coronary artery disease    • Dyslipidemia    • Essential hypertension      Past Surgical History:   Procedure Laterality Date   • CARDIAC CATHETERIZATION Left 12/4/2019    Procedure: Left Heart Cath;  Surgeon: James Hernandez MD;  Location: [facility] CATH INVASIVE LOCATION;  Service: Cardiovascular   • CARDIAC CATHETERIZATION N/A 12/4/2019    Procedure: Coronary angiography;  Surgeon: James Hernandez MD;  Location: [facility] CATH INVASIVE LOCATION;  Service: Cardiovascular   • CARDIAC SURGERY     • CORONARY ANGIOPLASTY WITH STENT PLACEMENT      KRISTEN: mid LAD & Ostial diagonal   • CORONARY ARTERY BYPASS GRAFT  12/06/2019    2 Vessels: LIMA to distal LAD   • CORONARY ARTERY BYPASS GRAFT N/A 12/6/2019    Procedure: CORONARY ARTERY BYPASS GRAFTING;  Surgeon: Laverne Boyle MD;  Location: [facility];  Service: Cardiothoracic   • INNER EAR SURGERY  1985     /87 (BP Location: Left arm, Patient Position: Sitting, Cuff Size: Adult)   Pulse 74   Ht 177.8 cm (70\")   Wt 90.7 kg (200 lb)   SpO2 98%   BMI 28.70 kg/m²   Family History   Problem Relation Age of Onset   • Diabetes Mother    • Diabetes Sister    • No Known Problems Father    • No Known Problems Brother    • No Known Problems Maternal Aunt    • No Known Problems Maternal Uncle    • No Known Problems Paternal Aunt    • No Known Problems Paternal Uncle    • No Known Problems Maternal Grandmother    • No Known Problems Maternal Grandfather    • No Known Problems Paternal Grandmother    • No Known Problems Paternal Grandfather    • No Known Problems Other    • Anemia Neg Hx    • Arrhythmia Neg Hx    • Asthma Neg Hx    • Clotting disorder Neg Hx    • Fainting Neg Hx    • Heart attack Neg Hx    • Heart disease Neg Hx    • Heart failure Neg Hx    • Hyperlipidemia Neg Hx    • Hypertension Neg Hx        Current Outpatient Medications:   •  albuterol sulfate HFA (PROAIR HFA) 108 (90 Base) MCG/ACT inhaler, Inhale 1 puff Daily As Needed., Disp: , " Rfl:   •  amLODIPine (NORVASC) 5 MG tablet, Take 1 tablet by mouth Daily., Disp: 90 tablet, Rfl: 3  •  aspirin (ASPIR-LOW) 81 MG EC tablet, Take 1 tablet by mouth Daily., Disp: , Rfl:   •  atorvastatin (LIPITOR) 40 MG tablet, Take 1 tablet by mouth Every Night., Disp: 90 tablet, Rfl: 3  •  clopidogrel (PLAVIX) 75 MG tablet, Take 1 tablet by mouth Daily., Disp: 90 tablet, Rfl: 3  •  furosemide (LASIX) 40 MG tablet, Take 1 tablet by mouth Daily., Disp: 90 tablet, Rfl: 2  •  levothyroxine (SYNTHROID, LEVOTHROID) 75 MCG tablet, Take 1 tablet by mouth Daily., Disp: 30 tablet, Rfl: 3  •  metoprolol tartrate (LOPRESSOR) 25 MG tablet, Take 0.5 tablets by mouth Every 12 (Twelve) Hours., Disp: 90 tablet, Rfl: 3  •  potassium chloride (K-DUR) 10 MEQ CR tablet, Take 1 tablet by mouth 2 (Two) Times a Day., Disp: 180 tablet, Rfl: 2  •  ferrous sulfate 325 (65 FE) MG tablet, Take 1 tablet by mouth Daily With Breakfast., Disp: 30 tablet, Rfl: 0  Social History     Socioeconomic History   • Marital status:      Spouse name: Not on file   • Number of children: Not on file   • Years of education: Not on file   • Highest education level: Not on file   Tobacco Use   • Smoking status: Never Smoker   • Smokeless tobacco: Never Used   Substance and Sexual Activity   • Alcohol use: Not Currently   • Drug use: Never   • Sexual activity: Defer     No Known Allergies  Review of Systems   Constitution: Negative for chills, fever and malaise/fatigue.   Cardiovascular: Positive for chest pain (twinge) and dyspnea on exertion. Negative for leg swelling, palpitations and syncope.   Respiratory: Positive for shortness of breath.    Skin: Negative for rash.   Neurological: Negative for dizziness, light-headedness and numbness.              Objective:     Physical Exam   Constitutional: He is oriented to person, place, and time. He appears well-developed and well-nourished. He is cooperative.   HENT:   Head: Normocephalic and atraumatic.    Mouth/Throat: Uvula is midline and oropharynx is clear and moist. No oral lesions.   Eyes: Conjunctivae are normal. No scleral icterus.   Neck: Trachea normal. Neck supple. No JVD present. Carotid bruit is not present. No thyromegaly present.   Cardiovascular: Normal rate, regular rhythm, S1 normal, S2 normal, normal heart sounds, intact distal pulses and normal pulses. PMI is not displaced. Exam reveals no gallop and no friction rub.   No murmur heard.  Pulmonary/Chest: Effort normal and breath sounds normal.   Abdominal: Soft. Bowel sounds are normal.   Musculoskeletal: Normal range of motion.   Neurological: He is alert and oriented to person, place, and time. He has normal strength.   No focal deficits   Skin: Skin is warm. No cyanosis.   Psychiatric: He has a normal mood and affect.         ECG 12 Lead  Date/Time: 3/20/2020 12:55 PM  Performed by: James Hernandez MD  Authorized by: James Hernandez MD   Comments:  EKG today normal sinus rhythm borderline IVCD normal axis compared to the last EKG no significant changes noted            Lab Review:       Assessment:          Diagnosis Plan   1. Coronary artery disease involving native coronary artery of native heart with unstable angina pectoris (CMS/HCC)  CK    Comprehensive Metabolic Panel    Lipid Panel   2. Essential hypertension  CK    Comprehensive Metabolic Panel    Lipid Panel   3. Coronary artery disease involving native coronary artery of native heart without angina pectoris  CK    Comprehensive Metabolic Panel    Lipid Panel   4. Mixed hyperlipidemia  CK    Comprehensive Metabolic Panel    Lipid Panel   5. S/P CABG x 2  CK    Comprehensive Metabolic Panel    Lipid Panel          Plan:         CAD status post CABG stable  Continue aggressive control of hypertension dyslipidemia

## 2020-04-02 RX ORDER — LEVOTHYROXINE SODIUM 0.07 MG/1
TABLET ORAL
Qty: 30 TABLET | Refills: 0 | OUTPATIENT
Start: 2020-04-02

## 2020-04-07 RX ORDER — LEVOTHYROXINE SODIUM 0.07 MG/1
TABLET ORAL
Qty: 30 TABLET | Refills: 0 | OUTPATIENT
Start: 2020-04-07

## 2020-04-08 RX ORDER — LEVOTHYROXINE SODIUM 0.07 MG/1
TABLET ORAL
Qty: 30 TABLET | Refills: 0 | OUTPATIENT
Start: 2020-04-08

## 2020-04-13 ENCOUNTER — TELEMEDICINE (OUTPATIENT)
Dept: CARDIAC SURGERY | Facility: CLINIC | Age: 60
End: 2020-04-13

## 2020-04-13 VITALS — HEIGHT: 70 IN | WEIGHT: 200 LBS | BODY MASS INDEX: 28.63 KG/M2

## 2020-04-13 DIAGNOSIS — Z95.1 S/P CABG (CORONARY ARTERY BYPASS GRAFT): Primary | ICD-10-CM

## 2020-04-13 PROCEDURE — 99024 POSTOP FOLLOW-UP VISIT: CPT | Performed by: THORACIC SURGERY (CARDIOTHORACIC VASCULAR SURGERY)

## 2020-04-14 NOTE — PROGRESS NOTES
Duke Freire is a 59 y.o. male s/p an urgent CABG. He denies any signs or symptoms of myocardial ischemia, cerebrovascular event, or infection. He reports good exercise tolerance.  Sternum is still slightly tender although it is improved.    Physical findings were reported to me by the patient via telehealth.    Sternum is stable, incision is clean, dry, and intact.  Mild tenderness and soreness.  Lower extremity incisions are clean, dry and intact.  GCS 15, no neurologic deficit.    He appears to be doing well.  His job does require significant stress on his upper extremities and chest; I would like to reevaluate him in the first week of May to confirm adequate progress before releasing him for work.

## 2020-04-15 RX ORDER — LEVOTHYROXINE SODIUM 0.07 MG/1
TABLET ORAL
Qty: 30 TABLET | Refills: 0 | OUTPATIENT
Start: 2020-04-15

## 2020-04-20 RX ORDER — LEVOTHYROXINE SODIUM 0.07 MG/1
TABLET ORAL
Qty: 30 TABLET | Refills: 0 | OUTPATIENT
Start: 2020-04-20

## 2020-04-21 RX ORDER — LEVOTHYROXINE SODIUM 0.07 MG/1
TABLET ORAL
Qty: 30 TABLET | Refills: 0 | OUTPATIENT
Start: 2020-04-21

## 2020-04-28 ENCOUNTER — OFFICE VISIT (OUTPATIENT)
Dept: FAMILY MEDICINE CLINIC | Facility: CLINIC | Age: 60
End: 2020-04-28

## 2020-04-28 VITALS
HEART RATE: 72 BPM | OXYGEN SATURATION: 99 % | SYSTOLIC BLOOD PRESSURE: 118 MMHG | WEIGHT: 205 LBS | TEMPERATURE: 98.1 F | BODY MASS INDEX: 29.41 KG/M2 | DIASTOLIC BLOOD PRESSURE: 82 MMHG

## 2020-04-28 DIAGNOSIS — M77.11 LATERAL EPICONDYLITIS OF RIGHT ELBOW: Primary | ICD-10-CM

## 2020-04-28 PROCEDURE — 99213 OFFICE O/P EST LOW 20 MIN: CPT | Performed by: PHYSICIAN ASSISTANT

## 2020-04-28 PROCEDURE — 96372 THER/PROPH/DIAG INJ SC/IM: CPT | Performed by: PHYSICIAN ASSISTANT

## 2020-04-28 RX ORDER — LISINOPRIL 10 MG/1
10 TABLET ORAL DAILY
COMMUNITY
Start: 2020-04-19

## 2020-04-28 RX ORDER — HYDROCODONE BITARTRATE AND ACETAMINOPHEN 5; 325 MG/1; MG/1
1 TABLET ORAL EVERY 6 HOURS PRN
COMMUNITY
End: 2020-04-28

## 2020-04-28 RX ORDER — LEVOTHYROXINE SODIUM 0.07 MG/1
75 TABLET ORAL DAILY
Qty: 90 TABLET | Refills: 1 | Status: SHIPPED | OUTPATIENT
Start: 2020-04-28

## 2020-04-28 RX ORDER — METHYLPREDNISOLONE ACETATE 80 MG/ML
80 INJECTION, SUSPENSION INTRA-ARTICULAR; INTRALESIONAL; INTRAMUSCULAR; SOFT TISSUE ONCE
Status: COMPLETED | OUTPATIENT
Start: 2020-04-28 | End: 2020-04-28

## 2020-04-28 RX ORDER — HYDROCODONE BITARTRATE AND ACETAMINOPHEN 5; 325 MG/1; MG/1
1 TABLET ORAL EVERY 6 HOURS PRN
Qty: 12 TABLET | Refills: 0 | Status: SHIPPED | OUTPATIENT
Start: 2020-04-28 | End: 2020-05-01

## 2020-04-28 RX ORDER — PREDNISONE 10 MG/1
TABLET ORAL
Qty: 20 TABLET | Refills: 0 | Status: SHIPPED | OUTPATIENT
Start: 2020-04-28

## 2020-04-28 RX ORDER — METHYLPREDNISOLONE ACETATE 80 MG/ML
80 INJECTION, SUSPENSION INTRA-ARTICULAR; INTRALESIONAL; INTRAMUSCULAR; SOFT TISSUE ONCE
Status: CANCELLED | OUTPATIENT
Start: 2020-04-28 | End: 2020-04-28

## 2020-04-28 RX ADMIN — METHYLPREDNISOLONE ACETATE 80 MG: 80 INJECTION, SUSPENSION INTRA-ARTICULAR; INTRALESIONAL; INTRAMUSCULAR; SOFT TISSUE at 10:37

## 2020-04-28 NOTE — PROGRESS NOTES
Subjective  Duke Freire is a 59 y.o. male     History of Present Illness  Patient is a 59-year-old white male complaining of pain to the right lateral elbow since this past Friday when he used an overhead drill for many hours.  He states that the arm began hurting Saturday, severely on Sunday.  It is exquisitely tender to the touch, numb and tingly which radiates down into the right hand.  He rates the pain is 9 out of 10.  He has applied ice which helps some.  He has not tried ibuprofen because he is on blood thinners.  He denies trauma to the area.    The following portions of the patient's history were reviewed and updated as appropriate: allergies, current medications, past family history, past medical history, past social history, past surgical history and problem list.    Review of Systems   Constitutional: Negative for fever.   HENT: Negative for sore throat.    Respiratory: Negative for shortness of breath.    Cardiovascular: Negative for chest pain.   Gastrointestinal: Negative for diarrhea, nausea and vomiting.   Musculoskeletal:        Right elbow pain   Psychiatric/Behavioral: Negative for suicidal ideas and depressed mood.       Objective  Physical Exam   Constitutional: He is oriented to person, place, and time. He appears well-developed and well-nourished.   HENT:   Head: Normocephalic and atraumatic.   Eyes: Pupils are equal, round, and reactive to light.   Cardiovascular: Normal rate, regular rhythm and normal heart sounds.   Pulmonary/Chest: Effort normal and breath sounds normal.   Musculoskeletal:        Right elbow: He exhibits swelling. He exhibits normal range of motion, no effusion, no deformity and no laceration. Tenderness found. Lateral epicondyle tenderness noted.        Arms:  Neurological: He is alert and oriented to person, place, and time.   Psychiatric: He has a normal mood and affect. His behavior is normal.       Vitals:    04/28/20 0908   BP: 118/82   BP Location: Right arm    Patient Position: Sitting   Cuff Size: Adult   Pulse: 72   Temp: 98.1 °F (36.7 °C)   SpO2: 99%   Weight: 93 kg (205 lb)     Body mass index is 29.41 kg/m².    PHQ-9 Total Score:        Assessment/Plan  Diagnoses and all orders for this visit:    1. Lateral epicondylitis of right elbow (Primary)  Comments:  Treating patient as a severe right lateral epicondylitis.  Depo-Medrol injection today in the office to reduce inflammation and swelling.  Prednisone taper for patient to start tomorrow for continued anti-inflammatory effect.  Norco 5 mg #12 given for patient to use as needed severe pain.  Recommended ice to the area 20 minutes on/20 minutes off, rest for 1 week.  Orders:  -     methylPREDNISolone acetate (DEPO-medrol) injection 80 mg  -     HYDROcodone-acetaminophen (Norco) 5-325 MG per tablet; Take 1 tablet by mouth Every 6 (Six) Hours As Needed for Moderate Pain  or Severe Pain  for up to 3 days.  Dispense: 12 tablet; Refill: 0    Other orders  -     levothyroxine (SYNTHROID, LEVOTHROID) 75 MCG tablet; Take 1 tablet by mouth Daily.  Dispense: 90 tablet; Refill: 1  -     predniSONE (DELTASONE) 10 MG tablet; 4 tabs daily x 2 days then 3 tabs daily x 2 days then 2 tabs daily x 2 days then 1 tab daily x 2 days  Dispense: 20 tablet; Refill: 0  -     Cancel: methylPREDNISolone acetate (DEPO-medrol) injection 80 mg

## 2020-05-11 ENCOUNTER — OFFICE VISIT (OUTPATIENT)
Dept: CARDIAC SURGERY | Facility: CLINIC | Age: 60
End: 2020-05-11

## 2020-05-11 VITALS
TEMPERATURE: 98 F | SYSTOLIC BLOOD PRESSURE: 125 MMHG | WEIGHT: 203.4 LBS | BODY MASS INDEX: 29.12 KG/M2 | HEIGHT: 70 IN | OXYGEN SATURATION: 99 % | HEART RATE: 82 BPM | RESPIRATION RATE: 20 BRPM | DIASTOLIC BLOOD PRESSURE: 91 MMHG

## 2020-05-11 DIAGNOSIS — Z95.1 S/P CABG (CORONARY ARTERY BYPASS GRAFT): Primary | ICD-10-CM

## 2020-05-11 PROCEDURE — 99024 POSTOP FOLLOW-UP VISIT: CPT | Performed by: THORACIC SURGERY (CARDIOTHORACIC VASCULAR SURGERY)

## 2020-05-13 NOTE — PROGRESS NOTES
Mr. Freire continues to have some sternal tenderness on palpation.  He denies any popping or clicking.  His incision is clean, dry, and intact.    Because of his tenderness I would like him to hold off on returning to work for another 2 months; I will reevaluate him at that time and hopefully clear him for work.

## 2020-06-18 ENCOUNTER — OFFICE VISIT (OUTPATIENT)
Dept: CARDIAC SURGERY | Facility: CLINIC | Age: 60
End: 2020-06-18

## 2020-06-18 VITALS
HEIGHT: 70 IN | DIASTOLIC BLOOD PRESSURE: 81 MMHG | TEMPERATURE: 98.4 F | SYSTOLIC BLOOD PRESSURE: 135 MMHG | RESPIRATION RATE: 20 BRPM | WEIGHT: 197 LBS | HEART RATE: 81 BPM | BODY MASS INDEX: 28.2 KG/M2 | OXYGEN SATURATION: 98 %

## 2020-06-18 DIAGNOSIS — I10 ESSENTIAL HYPERTENSION: ICD-10-CM

## 2020-06-18 DIAGNOSIS — Z95.1 S/P CABG (CORONARY ARTERY BYPASS GRAFT): Primary | ICD-10-CM

## 2020-06-18 DIAGNOSIS — E78.2 MIXED HYPERLIPIDEMIA: ICD-10-CM

## 2020-06-18 PROCEDURE — 99024 POSTOP FOLLOW-UP VISIT: CPT | Performed by: THORACIC SURGERY (CARDIOTHORACIC VASCULAR SURGERY)

## 2020-06-25 NOTE — PROGRESS NOTES
Mr. Freire is S/P an urgent CABG on 12/6/2019. His postoperative course was uneventful and he was discharged home. Although his functional status has improved greatly he still has tenderness to palpation of his sternum. His employment requires significant stress on his sternum. He has followed with me for several months in the hopes that his tenderness would subside. He denies any popping or clicking.     AVSS and WNL    Sternum stable to palpation, but . His incision is healed.  GCS 15, alert and oriented x3.    I hesitate to recommend him returning to full duty with his ongoing sternal tenderness. He may have to apply for permanent disability. I will reassess him in 3 months time from now.

## 2020-12-09 ENCOUNTER — TELEPHONE (OUTPATIENT)
Dept: CARDIOLOGY | Facility: CLINIC | Age: 60
End: 2020-12-09

## 2020-12-09 NOTE — TELEPHONE ENCOUNTER
Called patient to advise that we still need to get labs completed since his appointment is September was cancelled and we also need to get him rescheduled for his follow-up. No answer, LMOM

## 2020-12-23 ENCOUNTER — TELEPHONE (OUTPATIENT)
Dept: CARDIOLOGY | Facility: CLINIC | Age: 60
End: 2020-12-23

## 2020-12-23 NOTE — TELEPHONE ENCOUNTER
Called patient to advise that we still need to get labs completed since his appointment in September was cancelled and we also need to get him rescheduled for his follow-up. No answer, LMOM

## 2021-01-18 ENCOUNTER — TELEPHONE (OUTPATIENT)
Dept: CARDIOLOGY | Facility: CLINIC | Age: 61
End: 2021-01-18

## 2021-01-18 NOTE — TELEPHONE ENCOUNTER
Attempted to reach patient 3 times regarding missing labs. Letter was mailed out to the patient on 01/18/2021

## 2023-07-05 PROBLEM — M16.11 PRIMARY OSTEOARTHRITIS OF RIGHT HIP: Status: ACTIVE | Noted: 2023-07-05

## 2023-08-10 DIAGNOSIS — Z01.818 PRE-OP EVALUATION: Primary | ICD-10-CM

## 2023-08-16 ENCOUNTER — PRE-ADMISSION TESTING (OUTPATIENT)
Dept: PREADMISSION TESTING | Facility: HOSPITAL | Age: 63
End: 2023-08-16
Payer: OTHER GOVERNMENT

## 2023-08-16 ENCOUNTER — LAB (OUTPATIENT)
Dept: LAB | Facility: HOSPITAL | Age: 63
End: 2023-08-16
Payer: OTHER GOVERNMENT

## 2023-08-16 ENCOUNTER — HOSPITAL ENCOUNTER (OUTPATIENT)
Dept: GENERAL RADIOLOGY | Facility: HOSPITAL | Age: 63
Discharge: HOME | End: 2023-08-16
Payer: OTHER GOVERNMENT

## 2023-08-16 ENCOUNTER — HOSPITAL ENCOUNTER (OUTPATIENT)
Dept: CARDIOLOGY | Facility: HOSPITAL | Age: 63
Discharge: HOME | End: 2023-08-16
Payer: OTHER GOVERNMENT

## 2023-08-16 VITALS
HEIGHT: 70 IN | HEART RATE: 70 BPM | RESPIRATION RATE: 20 BRPM | DIASTOLIC BLOOD PRESSURE: 92 MMHG | WEIGHT: 224 LBS | OXYGEN SATURATION: 100 % | TEMPERATURE: 97.7 F | BODY MASS INDEX: 32.07 KG/M2 | SYSTOLIC BLOOD PRESSURE: 142 MMHG

## 2023-08-16 DIAGNOSIS — Z01.818 PRE-OP EVALUATION: ICD-10-CM

## 2023-08-16 DIAGNOSIS — M16.11 PRIMARY OSTEOARTHRITIS OF RIGHT HIP: Primary | ICD-10-CM

## 2023-08-16 LAB
ABO GROUP BLD: NORMAL
ANION GAP SERPL CALCULATED.3IONS-SCNC: 12 MMOL/L (ref 5–15)
BASOPHILS # BLD AUTO: 0.06 10*3/MM3 (ref 0–0.2)
BASOPHILS NFR BLD AUTO: 0.9 % (ref 0–1.5)
BILIRUB UR QL STRIP: NEGATIVE
BLD GP AB SCN SERPL QL: NEGATIVE
BUN SERPL-MCNC: 18 MG/DL (ref 8–23)
BUN/CREAT SERPL: 17 (ref 7–25)
CALCIUM SPEC-SCNC: 9 MG/DL (ref 8.6–10.5)
CHLORIDE SERPL-SCNC: 105 MMOL/L (ref 98–107)
CLARITY UR: CLEAR
CO2 SERPL-SCNC: 24 MMOL/L (ref 22–29)
COLOR UR: YELLOW
CREAT SERPL-MCNC: 1.06 MG/DL (ref 0.76–1.27)
DEPRECATED RDW RBC AUTO: 44.9 FL (ref 37–54)
EGFRCR SERPLBLD CKD-EPI 2021: 79.8 ML/MIN/1.73
EOSINOPHIL # BLD AUTO: 0.17 10*3/MM3 (ref 0–0.4)
EOSINOPHIL NFR BLD AUTO: 2.4 % (ref 0.3–6.2)
ERYTHROCYTE [DISTWIDTH] IN BLOOD BY AUTOMATED COUNT: 13.3 % (ref 12.3–15.4)
GLUCOSE SERPL-MCNC: 95 MG/DL (ref 65–99)
GLUCOSE UR STRIP-MCNC: NEGATIVE MG/DL
HBA1C MFR BLD: 4.7 % (ref 4.8–5.6)
HCT VFR BLD AUTO: 37 % (ref 37.5–51)
HGB BLD-MCNC: 13.1 G/DL (ref 13–17.7)
HGB UR QL STRIP.AUTO: NEGATIVE
IMM GRANULOCYTES # BLD AUTO: 0.04 10*3/MM3 (ref 0–0.05)
IMM GRANULOCYTES NFR BLD AUTO: 0.6 % (ref 0–0.5)
KETONES UR QL STRIP: NEGATIVE
LEUKOCYTE ESTERASE UR QL STRIP.AUTO: NEGATIVE
LYMPHOCYTES # BLD AUTO: 1.94 10*3/MM3 (ref 0.7–3.1)
LYMPHOCYTES NFR BLD AUTO: 27.7 % (ref 19.6–45.3)
MCH RBC QN AUTO: 32.7 PG (ref 26.6–33)
MCHC RBC AUTO-ENTMCNC: 35.4 G/DL (ref 31.5–35.7)
MCV RBC AUTO: 92.3 FL (ref 79–97)
MONOCYTES # BLD AUTO: 0.63 10*3/MM3 (ref 0.1–0.9)
MONOCYTES NFR BLD AUTO: 9 % (ref 5–12)
MRSA DNA SPEC QL NAA+PROBE: NORMAL
NEUTROPHILS NFR BLD AUTO: 4.17 10*3/MM3 (ref 1.7–7)
NEUTROPHILS NFR BLD AUTO: 59.4 % (ref 42.7–76)
NITRITE UR QL STRIP: NEGATIVE
NRBC BLD AUTO-RTO: 0 /100 WBC (ref 0–0.2)
PH UR STRIP.AUTO: 5.5 [PH] (ref 5–8)
PLATELET # BLD AUTO: 174 10*3/MM3 (ref 140–450)
PMV BLD AUTO: 10 FL (ref 6–12)
POTASSIUM SERPL-SCNC: 4.1 MMOL/L (ref 3.5–5.2)
PROT UR QL STRIP: NEGATIVE
QT INTERVAL: 429 MS
RBC # BLD AUTO: 4.01 10*6/MM3 (ref 4.14–5.8)
RH BLD: POSITIVE
SODIUM SERPL-SCNC: 141 MMOL/L (ref 136–145)
SP GR UR STRIP: 1.02 (ref 1–1.03)
T&S EXPIRATION DATE: NORMAL
UROBILINOGEN UR QL STRIP: NORMAL
WBC NRBC COR # BLD: 7.01 10*3/MM3 (ref 3.4–10.8)

## 2023-08-16 PROCEDURE — 83036 HEMOGLOBIN GLYCOSYLATED A1C: CPT

## 2023-08-16 PROCEDURE — 80048 BASIC METABOLIC PNL TOTAL CA: CPT

## 2023-08-16 PROCEDURE — 87641 MR-STAPH DNA AMP PROBE: CPT

## 2023-08-16 PROCEDURE — 97161 PT EVAL LOW COMPLEX 20 MIN: CPT

## 2023-08-16 PROCEDURE — 93005 ELECTROCARDIOGRAM TRACING: CPT | Performed by: NURSE PRACTITIONER

## 2023-08-16 PROCEDURE — 85025 COMPLETE CBC W/AUTO DIFF WBC: CPT

## 2023-08-16 PROCEDURE — 86900 BLOOD TYPING SEROLOGIC ABO: CPT

## 2023-08-16 PROCEDURE — 71046 X-RAY EXAM CHEST 2 VIEWS: CPT

## 2023-08-16 PROCEDURE — 81003 URINALYSIS AUTO W/O SCOPE: CPT

## 2023-08-16 PROCEDURE — 36415 COLL VENOUS BLD VENIPUNCTURE: CPT

## 2023-08-16 PROCEDURE — 86901 BLOOD TYPING SEROLOGIC RH(D): CPT

## 2023-08-16 PROCEDURE — 86850 RBC ANTIBODY SCREEN: CPT

## 2023-08-16 RX ORDER — LIDOCAINE 50 MG/G
1 PATCH TOPICAL EVERY 24 HOURS
COMMUNITY
End: 2023-08-23 | Stop reason: HOSPADM

## 2023-08-16 RX ORDER — ONDANSETRON 4 MG/1
4 TABLET, FILM COATED ORAL EVERY 8 HOURS PRN
Qty: 20 TABLET | Refills: 0 | Status: SHIPPED | OUTPATIENT
Start: 2023-08-16

## 2023-08-16 RX ORDER — OXYCODONE AND ACETAMINOPHEN 7.5; 325 MG/1; MG/1
TABLET ORAL
Qty: 45 TABLET | Refills: 0 | Status: SHIPPED | OUTPATIENT
Start: 2023-08-16

## 2023-08-16 RX ORDER — HYDROCODONE BITARTRATE AND ACETAMINOPHEN 5; 325 MG/1; MG/1
1 TABLET ORAL EVERY 6 HOURS PRN
COMMUNITY
End: 2023-08-23 | Stop reason: HOSPADM

## 2023-08-16 RX ORDER — FOLIC ACID 1 MG/1
1 TABLET ORAL DAILY
COMMUNITY

## 2023-08-16 RX ORDER — AMOXICILLIN 250 MG
2 CAPSULE ORAL 2 TIMES DAILY
Qty: 120 TABLET | Refills: 0 | Status: SHIPPED | OUTPATIENT
Start: 2023-08-16 | End: 2023-09-15

## 2023-08-16 RX ORDER — TAMSULOSIN HYDROCHLORIDE 0.4 MG/1
1 CAPSULE ORAL DAILY
COMMUNITY

## 2023-08-16 RX ORDER — GABAPENTIN 400 MG/1
400 CAPSULE ORAL 3 TIMES DAILY
COMMUNITY

## 2023-08-16 RX ORDER — MIDODRINE HYDROCHLORIDE 2.5 MG/1
2.5 TABLET ORAL
COMMUNITY

## 2023-08-16 RX ORDER — SENNOSIDES 8.6 MG
650 CAPSULE ORAL EVERY 8 HOURS PRN
COMMUNITY
End: 2023-08-23 | Stop reason: HOSPADM

## 2023-08-16 NOTE — THERAPY EVALUATION
Patient Name: Duke Freire  : 1960    MRN: [id number]                              Today's Date: 2023       Admit Date: (Not on file)    Visit Dx:     ICD-10-CM ICD-9-CM   1. Primary osteoarthritis of right hip  M16.11 715.15     Patient Active Problem List   Diagnosis    Abnormal nuclear stress test    Angina pectoris    Coronary artery disease involving native coronary artery of native heart with unstable angina pectoris    Essential hypertension    Coronary artery disease    S/P CABG x 2    Abnormal result of cardiovascular function study    Anxiety    Erectile dysfunction    Hyperlipidemia    Coronary heart disease    Colon cancer screening    Primary osteoarthritis of right hip     Past Medical History:   Diagnosis Date    Anesthesia complication     pt does not believe he was completely sedated during CABG     he still feels incisional pain and has flashbacks of the cut    Asthma     pt is unsure of diagnosis    Coronary artery disease     Dyslipidemia     Essential hypertension     hx    Hyperlipidemia     Hyperthyroidism     Hypothyroidism     PONV (postoperative nausea and vomiting)     Primary osteoarthritis of right hip 2023     Past Surgical History:   Procedure Laterality Date    CARDIAC CATHETERIZATION Left 2019    Procedure: Left Heart Cath;  Surgeon: James Hernandez MD;  Location: [facility] CATH INVASIVE LOCATION;  Service: Cardiovascular    CARDIAC CATHETERIZATION N/A 2019    Procedure: Coronary angiography;  Surgeon: James Hernandez MD;  Location: [facility] CATH INVASIVE LOCATION;  Service: Cardiovascular    CARDIAC SURGERY      CORONARY ANGIOPLASTY WITH STENT PLACEMENT      KRISTEN: mid LAD & Ostial diagonal    CORONARY ARTERY BYPASS GRAFT  2019    2 Vessels: LIMA to distal LAD    CORONARY ARTERY BYPASS GRAFT N/A 2019    Procedure: CORONARY ARTERY BYPASS GRAFTING;  Surgeon: Laverne Boyle MD;  Location: [facility] CVOR;  Service:  Cardiothoracic    INNER EAR SURGERY  1985      General Information       Row Name 08/16/23 1115          Physical Therapy Time and Intention    Document Type evaluation  -CR     Mode of Treatment physical therapy  -CR       Row Name 08/16/23 1115          General Information    Patient Profile Reviewed yes  -CR     Prior Level of Function independent:  using 1 crutch on R side  -CR     Barriers to Rehab none identified  -CR       Row Name 08/16/23 1115          Living Environment    People in Home child(janet), dependent  15 y/o daughter  -CR       Row Name 08/16/23 1115          Home Main Entrance    Number of Stairs, Main Entrance five  -CR     Stair Railings, Main Entrance railings on both sides of stairs  -CR       Row Name 08/16/23 1115          Stairs Within Home, Primary    Number of Stairs, Within Home, Primary none  -CR       Row Name 08/16/23 1115          Cognition    Orientation Status (Cognition) oriented x 4  -CR       Row Name 08/16/23 1115          Safety Issues, Functional Mobility    Impairments Affecting Function (Mobility) pain;range of motion (ROM);endurance/activity tolerance  -CR               User Key  (r) = Recorded By, (t) = Taken By, (c) = Cosigned By      Initials Name Provider Type    CR Reyes, Carmela, BRITTANY Physical Therapist                   Mobility       Row Name 08/16/23 1116          Sit-Stand Transfer    Sit-Stand [place] (Transfers) modified independence  -CR       Row Name 08/16/23 1116          Gait/Stairs (Locomotion)    [place] Level (Gait) modified independence  -CR     Assistive Device (Gait) crutches, axillary  -CR     Deviations/Abnormal Patterns (Gait) antalgic;gait speed decreased  -CR     Right Sided Gait Deviations weight shift ability decreased  -CR               User Key  (r) = Recorded By, (t) = Taken By, (c) = Cosigned By      Initials Name Provider Type    CR Reyes, Carmela, PT Physical Therapist                   Obj/Interventions       Row Name 08/16/23  1116          Range of Motion Comprehensive    Comment, General Range of Motion AROM RLE min limit, LLE wfl  -CR       Row Name 08/16/23 1116          Strength Comprehensive (MMT)    Comment, General Manual Muscle Testing (MMT) Assessment RLE grossly 3/5, LLE grossly wfl  -CR       Row Name 08/16/23 1116          Balance    Balance Assessment sitting static balance;sitting dynamic balance;sit to stand dynamic balance;standing static balance;standing dynamic balance  -CR     Static Sitting Balance modified independence  -CR     Dynamic Sitting Balance modified independence  -CR     Position, Sitting Balance sitting in chair  -CR     Sit to Stand Dynamic Balance modified independence  -CR     Static Standing Balance modified independence  -CR     Dynamic Standing Balance standby assist  -CR     Position/Device Used, Standing Balance crutches, axillary  -CR       Row Name 08/16/23 1116          Sensory Assessment (Somatosensory)    Sensory Assessment (Somatosensory) sensation intact  -CR               User Key  (r) = Recorded By, (t) = Taken By, (c) = Cosigned By      Initials Name Provider Type    CR Reyes, Carmela, PT Physical Therapist                   Goals/Plan       Row Name 08/16/23 1121          Gait Training Goal 1 (PT)    Activity/Assistive Device (Gait Training Goal 1, PT) gait (walking locomotion);assistive device use;diminish gait deviation;improve balance and speed;increase endurance/gait distance;walker, rolling  -CR     [place] Level (Gait Training Goal 1, PT) standby assist  -CR     Distance (Gait Training Goal 1, PT) 100  -CR     Time Frame (Gait Training Goal 1, PT) 1 week  -CR       Row Name 08/16/23 1121          Stairs Goal 1 (PT)    Activity/Assistive Device (Stairs Goal 1, PT) stairs, all skills  -CR     [place] Level/Cues Needed (Stairs Goal 1, PT) standby assist  -CR     Number of Stairs (Stairs Goal 1, PT) 5  -CR     Time Frame (Stairs Goal 1, PT) 1 week  -CR       Row Name  08/16/23 1121          Patient Education Goal (PT)    Activity (Patient Education Goal, PT) HEP  -CR     [place]/Cues/Accuracy (Memory Goal 2, PT) verbalizes understanding  -CR     Progress/Outcome (Patient Education Goal, PT) goal met  -CR       Row Name 08/16/23 1121          Therapy Assessment/Plan (PT)    Planned Therapy Interventions (PT) gait training;home exercise program;patient/family education;stair training  -CR               User Key  (r) = Recorded By, (t) = Taken By, (c) = Cosigned By      Initials Name Provider Type    CR Reyes, Carmela, BRITTANY Physical Therapist                   Clinical Impression       Row Name 08/16/23 1117          Pain    Pretreatment Pain Rating 10/10  -CR     Posttreatment Pain Rating 10/10  -CR     Pain Location - Side/Orientation Right  -CR     Pain Location - hip  -CR       Row Name 08/16/23 1117          Plan of Care Review    Plan of Care Reviewed With patient  -CR     Outcome Evaluation 61 y/o male with DJD R hip scheduled for ant THR on 8/22/2023. PMH includes htn, CAD s/p CABG, OA. Patient lives with 13 y/o daughter in mobile home with 5 step entry. Patient currently using 1 axillary crutch on R side with marked limp. Instructed patient to place a.d. on L side and off load R LE. Educated on HEP, use of a.d., icing and plan of care following surgery. Patient should do well with  PT at discharge, ,will need rw.  -CR       Row Name 08/16/23 1117          Therapy Assessment/Plan (PT)    Patient/Family Therapy Goals Statement (PT) pain relief  -CR     Rehab Potential (PT) good, to achieve stated therapy goals  -CR     Criteria for Skilled Interventions Met (PT) yes;skilled treatment is necessary  -CR     Therapy Frequency (PT) 2 times/day  -CR     Predicted Duration of Therapy Intervention (PT) dc  -CR               User Key  (r) = Recorded By, (t) = Taken By, (c) = Cosigned By      Initials Name Provider Type    CR Reyes, Carmela, PT Physical Therapist                    Outcome Measures    No documentation.                                Physical Therapy Education       Title: PT OT SLP Therapies (Done)       Topic: Physical Therapy (Done)       Point: Mobility training (Done)       Learning Progress Summary             Patient Acceptance, E,D,H, VU by CR at 8/16/2023 1122                         Point: Home exercise program (Done)       Learning Progress Summary             Patient Acceptance, E,D,H, VU by CR at 8/16/2023 1122                                         User Key       Initials Effective Dates Name Provider Type Discipline    CR 06/16/21 -  Reyes, Carmela, PT Physical Therapist PT                  PT Recommendation and Plan  Planned Therapy Interventions (PT): gait training, home exercise program, patient/family education, stair training  Plan of Care Reviewed With: patient  Outcome Evaluation: 61 y/o male with DJD R hip scheduled for ant THR on 8/22/2023. PMH includes htn, CAD s/p CABG, OA. Patient lives with 15 y/o daughter in mobile home with 5 step entry. Patient currently using 1 axillary crutch on R side with marked limp. Instructed patient to place a.d. on L side and off load R LE. Educated on HEP, use of a.d., icing and plan of care following surgery. Patient should do well with HH PT at discharge, ,will need rw.     Time Calculation:         PT Charges       Row Name 08/16/23 1122             Time Calculation    Start Time 1050  -CR      Stop Time 1111  -CR      Time Calculation (min) 21 min  -CR      PT Received On 08/16/23  -CR      PT - Next Appointment 08/22/23  -CR         Time Calculation- PT    Total Timed Code Minutes- PT 0 minute(s)  -CR                User Key  (r) = Recorded By, (t) = Taken By, (c) = Cosigned By      Initials Name Provider Type    CR Reyes, Carmela, PT Physical Therapist                  Therapy Charges for Today       Code Description Service Date Service Provider Modifiers Qty    12560542316 HC PT EVAL LOW COMPLEXITY 4  8/16/2023 Reyes, Carmela, PT GP 1               PT Discharge Summary  Anticipated Discharge Disposition (PT): home with home health    Carmela Reyes, PT  8/16/2023

## 2023-08-16 NOTE — PLAN OF CARE
Goal Outcome Evaluation:  Plan of Care Reviewed With: patient           Outcome Evaluation: 63 y/o male with DJD R hip scheduled for ant THR on 8/22/2023. PMH includes htn, CAD s/p CABG, OA. Patient lives with 15 y/o daughter in mobile home with 5 step entry. Patient currently using 1 axillary crutch on R side with marked limp. Instructed patient to place a.d. on L side and off load R LE. Educated on HEP, use of a.d., icing and plan of care following surgery. Patient should do well with HH PT at discharge, ,will need rw.      Anticipated Discharge Disposition (PT): home with home health

## 2023-08-22 ENCOUNTER — HOSPITAL ENCOUNTER (OUTPATIENT)
Facility: HOSPITAL | Age: 63
Discharge: HOME | End: 2023-08-23
Attending: ORTHOPAEDIC SURGERY | Admitting: ORTHOPAEDIC SURGERY
Payer: OTHER GOVERNMENT

## 2023-08-22 ENCOUNTER — APPOINTMENT (OUTPATIENT)
Dept: GENERAL RADIOLOGY | Facility: HOSPITAL | Age: 63
End: 2023-08-22
Payer: OTHER GOVERNMENT

## 2023-08-22 ENCOUNTER — ANESTHESIA (OUTPATIENT)
Dept: PERIOP | Facility: HOSPITAL | Age: 63
End: 2023-08-22
Payer: OTHER GOVERNMENT

## 2023-08-22 ENCOUNTER — ANESTHESIA EVENT (OUTPATIENT)
Dept: PERIOP | Facility: HOSPITAL | Age: 63
End: 2023-08-22
Payer: OTHER GOVERNMENT

## 2023-08-22 DIAGNOSIS — Z96.641 S/P HIP REPLACEMENT, RIGHT: Primary | ICD-10-CM

## 2023-08-22 DIAGNOSIS — M16.11 PRIMARY OSTEOARTHRITIS OF RIGHT HIP: ICD-10-CM

## 2023-08-22 PROCEDURE — 25010000002 ONDANSETRON PER 1 MG: Performed by: ANESTHESIOLOGY

## 2023-08-22 PROCEDURE — 97164 PT RE-EVAL EST PLAN CARE: CPT

## 2023-08-22 PROCEDURE — G0378 HOSPITAL OBSERVATION PER HR: HCPCS

## 2023-08-22 PROCEDURE — 25010000002 EPINEPHRINE 1 MG/ML SOLUTION: Performed by: ORTHOPAEDIC SURGERY

## 2023-08-22 PROCEDURE — S0260 H&P FOR SURGERY: HCPCS | Performed by: ORTHOPAEDIC SURGERY

## 2023-08-22 PROCEDURE — 25010000002 CEFAZOLIN PER 500 MG: Performed by: ORTHOPAEDIC SURGERY

## 2023-08-22 PROCEDURE — 25010000002 HYDROMORPHONE 1 MG/ML SOLUTION: Performed by: NURSE ANESTHETIST, CERTIFIED REGISTERED

## 2023-08-22 PROCEDURE — 76000 FLUOROSCOPY <1 HR PHYS/QHP: CPT

## 2023-08-22 PROCEDURE — 25010000002 ONDANSETRON PER 1 MG: Performed by: NURSE ANESTHETIST, CERTIFIED REGISTERED

## 2023-08-22 PROCEDURE — C1713 ANCHOR/SCREW BN/BN,TIS/BN: HCPCS | Performed by: ORTHOPAEDIC SURGERY

## 2023-08-22 PROCEDURE — 72170 X-RAY EXAM OF PELVIS: CPT

## 2023-08-22 PROCEDURE — 25010000002 FENTANYL CITRATE (PF) 100 MCG/2ML SOLUTION: Performed by: NURSE ANESTHETIST, CERTIFIED REGISTERED

## 2023-08-22 PROCEDURE — C1776 JOINT DEVICE (IMPLANTABLE): HCPCS | Performed by: ORTHOPAEDIC SURGERY

## 2023-08-22 PROCEDURE — 25010000002 KETOROLAC TROMETHAMINE PER 15 MG: Performed by: ORTHOPAEDIC SURGERY

## 2023-08-22 PROCEDURE — 27130 TOTAL HIP ARTHROPLASTY: CPT | Performed by: SPECIALIST/TECHNOLOGIST, OTHER

## 2023-08-22 PROCEDURE — 25010000002 SUCCINYLCHOLINE PER 20 MG: Performed by: NURSE ANESTHETIST, CERTIFIED REGISTERED

## 2023-08-22 PROCEDURE — 25010000002 CLONIDINE PER 1 MG: Performed by: ORTHOPAEDIC SURGERY

## 2023-08-22 PROCEDURE — 25010000002 CEFAZOLIN PER 500 MG: Performed by: NURSE PRACTITIONER

## 2023-08-22 PROCEDURE — 25010000002 ROPIVACAINE PER 1 MG: Performed by: ORTHOPAEDIC SURGERY

## 2023-08-22 PROCEDURE — 25010000002 DEXAMETHASONE PER 1 MG: Performed by: NURSE ANESTHETIST, CERTIFIED REGISTERED

## 2023-08-22 PROCEDURE — 25010000002 PROPOFOL 10 MG/ML EMULSION: Performed by: NURSE ANESTHETIST, CERTIFIED REGISTERED

## 2023-08-22 PROCEDURE — 27130 TOTAL HIP ARTHROPLASTY: CPT | Performed by: ORTHOPAEDIC SURGERY

## 2023-08-22 PROCEDURE — 73501 X-RAY EXAM HIP UNI 1 VIEW: CPT

## 2023-08-22 PROCEDURE — 25010000002 MAGNESIUM SULFATE PER 500 MG OF MAGNESIUM: Performed by: NURSE ANESTHETIST, CERTIFIED REGISTERED

## 2023-08-22 DEVICE — HEMOST ABS SURGICEL PWDR 3GM: Type: IMPLANTABLE DEVICE | Site: HIP | Status: FUNCTIONAL

## 2023-08-22 DEVICE — LINER ACET G7 VIVACIT/E NTRL HXPE SZG 40MM: Type: IMPLANTABLE DEVICE | Site: HIP | Status: FUNCTIONAL

## 2023-08-22 DEVICE — DEV CONTRL TISS STRATAFIX SPIRAL MNCRYL UD 3/0 PLS 30CM: Type: IMPLANTABLE DEVICE | Site: HIP | Status: FUNCTIONAL

## 2023-08-22 DEVICE — STEM FEM/HIP AVENIR/COMPLETE HI/OFFST HA SZ7: Type: IMPLANTABLE DEVICE | Site: HIP | Status: FUNCTIONAL

## 2023-08-22 DEVICE — SUT NONABS MAXBRAID/PE NMBR2 C7 38IN BLU 900335: Type: IMPLANTABLE DEVICE | Site: HIP | Status: FUNCTIONAL

## 2023-08-22 DEVICE — SCRW ACET CORT TRILOGY S/TAP 6.5X20: Type: IMPLANTABLE DEVICE | Site: HIP | Status: FUNCTIONAL

## 2023-08-22 DEVICE — CP HIP UPCHRG OSSEOTI LTD HL CUPS: Type: IMPLANTABLE DEVICE | Site: HIP | Status: FUNCTIONAL

## 2023-08-22 DEVICE — SHLL ACET OSSEOTI G7 4H SZG 60MM: Type: IMPLANTABLE DEVICE | Site: HIP | Status: FUNCTIONAL

## 2023-08-22 DEVICE — BIOLOX® DELTA, CERAMIC FEMORAL HEAD, M, Ø 40/0, TAPER 12/14
Type: IMPLANTABLE DEVICE | Site: HIP | Status: FUNCTIONAL
Brand: BIOLOX® DELTA

## 2023-08-22 DEVICE — TOTAL HIP PRIMARY: Type: IMPLANTABLE DEVICE | Site: HIP | Status: FUNCTIONAL

## 2023-08-22 RX ORDER — LEVOTHYROXINE SODIUM 0.07 MG/1
75 TABLET ORAL DAILY
Status: DISCONTINUED | OUTPATIENT
Start: 2023-08-23 | End: 2023-08-23 | Stop reason: HOSPADM

## 2023-08-22 RX ORDER — ACETAMINOPHEN 325 MG/1
325 TABLET ORAL EVERY 6 HOURS PRN
Status: DISCONTINUED | OUTPATIENT
Start: 2023-08-22 | End: 2023-08-23 | Stop reason: HOSPADM

## 2023-08-22 RX ORDER — AMOXICILLIN 250 MG
2 CAPSULE ORAL 2 TIMES DAILY
Status: DISCONTINUED | OUTPATIENT
Start: 2023-08-22 | End: 2023-08-23 | Stop reason: HOSPADM

## 2023-08-22 RX ORDER — GABAPENTIN 400 MG/1
400 CAPSULE ORAL 3 TIMES DAILY
Status: DISCONTINUED | OUTPATIENT
Start: 2023-08-22 | End: 2023-08-23 | Stop reason: HOSPADM

## 2023-08-22 RX ORDER — SODIUM CHLORIDE 0.9 % (FLUSH) 0.9 %
10 SYRINGE (ML) INJECTION AS NEEDED
Status: DISCONTINUED | OUTPATIENT
Start: 2023-08-22 | End: 2023-08-22 | Stop reason: HOSPADM

## 2023-08-22 RX ORDER — PHENYLEPHRINE HCL IN 0.9% NACL 1 MG/10 ML
SYRINGE (ML) INTRAVENOUS AS NEEDED
Status: DISCONTINUED | OUTPATIENT
Start: 2023-08-22 | End: 2023-08-22 | Stop reason: SURG

## 2023-08-22 RX ORDER — MEPERIDINE HYDROCHLORIDE 25 MG/ML
12.5 INJECTION INTRAMUSCULAR; INTRAVENOUS; SUBCUTANEOUS
Status: DISCONTINUED | OUTPATIENT
Start: 2023-08-22 | End: 2023-08-22 | Stop reason: HOSPADM

## 2023-08-22 RX ORDER — ONDANSETRON 2 MG/ML
INJECTION INTRAMUSCULAR; INTRAVENOUS AS NEEDED
Status: DISCONTINUED | OUTPATIENT
Start: 2023-08-22 | End: 2023-08-22 | Stop reason: SURG

## 2023-08-22 RX ORDER — TRANEXAMIC ACID 10 MG/ML
1000 INJECTION, SOLUTION INTRAVENOUS ONCE
Status: COMPLETED | OUTPATIENT
Start: 2023-08-22 | End: 2023-08-22

## 2023-08-22 RX ORDER — NALOXONE HCL 0.4 MG/ML
0.4 VIAL (ML) INJECTION AS NEEDED
Status: DISCONTINUED | OUTPATIENT
Start: 2023-08-22 | End: 2023-08-22 | Stop reason: HOSPADM

## 2023-08-22 RX ORDER — TRANEXAMIC ACID 10 MG/ML
1000 INJECTION, SOLUTION INTRAVENOUS ONCE
Status: DISCONTINUED | OUTPATIENT
Start: 2023-08-22 | End: 2023-08-22 | Stop reason: HOSPADM

## 2023-08-22 RX ORDER — MAGNESIUM SULFATE HEPTAHYDRATE 500 MG/ML
INJECTION, SOLUTION INTRAMUSCULAR; INTRAVENOUS AS NEEDED
Status: DISCONTINUED | OUTPATIENT
Start: 2023-08-22 | End: 2023-08-22 | Stop reason: SURG

## 2023-08-22 RX ORDER — MELOXICAM 15 MG/1
15 TABLET ORAL DAILY
Status: DISCONTINUED | OUTPATIENT
Start: 2023-08-23 | End: 2023-08-23 | Stop reason: HOSPADM

## 2023-08-22 RX ORDER — LABETALOL HYDROCHLORIDE 5 MG/ML
5 INJECTION, SOLUTION INTRAVENOUS
Status: DISCONTINUED | OUTPATIENT
Start: 2023-08-22 | End: 2023-08-22 | Stop reason: HOSPADM

## 2023-08-22 RX ORDER — ONDANSETRON 4 MG/1
4 TABLET, FILM COATED ORAL EVERY 8 HOURS PRN
Status: DISCONTINUED | OUTPATIENT
Start: 2023-08-22 | End: 2023-08-22 | Stop reason: SDUPTHER

## 2023-08-22 RX ORDER — EPHEDRINE SULFATE 5 MG/ML
INJECTION INTRAVENOUS AS NEEDED
Status: DISCONTINUED | OUTPATIENT
Start: 2023-08-22 | End: 2023-08-22 | Stop reason: SURG

## 2023-08-22 RX ORDER — EPHEDRINE SULFATE 5 MG/ML
5 INJECTION INTRAVENOUS ONCE AS NEEDED
Status: DISCONTINUED | OUTPATIENT
Start: 2023-08-22 | End: 2023-08-22 | Stop reason: HOSPADM

## 2023-08-22 RX ORDER — ONDANSETRON 2 MG/ML
4 INJECTION INTRAMUSCULAR; INTRAVENOUS EVERY 6 HOURS PRN
Status: DISCONTINUED | OUTPATIENT
Start: 2023-08-22 | End: 2023-08-22 | Stop reason: HOSPADM

## 2023-08-22 RX ORDER — KETAMINE HCL IN NACL, ISO-OSM 100MG/10ML
SYRINGE (ML) INJECTION AS NEEDED
Status: DISCONTINUED | OUTPATIENT
Start: 2023-08-22 | End: 2023-08-22 | Stop reason: SURG

## 2023-08-22 RX ORDER — LIDOCAINE HYDROCHLORIDE 10 MG/ML
0.5 INJECTION, SOLUTION INFILTRATION; PERINEURAL ONCE AS NEEDED
Status: DISCONTINUED | OUTPATIENT
Start: 2023-08-22 | End: 2023-08-22 | Stop reason: HOSPADM

## 2023-08-22 RX ORDER — CALCIUM CHLORIDE 100 MG/ML
INJECTION INTRAVENOUS; INTRAVENTRICULAR AS NEEDED
Status: DISCONTINUED | OUTPATIENT
Start: 2023-08-22 | End: 2023-08-22 | Stop reason: SURG

## 2023-08-22 RX ORDER — DIPHENHYDRAMINE HYDROCHLORIDE 50 MG/ML
12.5 INJECTION INTRAMUSCULAR; INTRAVENOUS ONCE AS NEEDED
Status: DISCONTINUED | OUTPATIENT
Start: 2023-08-22 | End: 2023-08-22 | Stop reason: HOSPADM

## 2023-08-22 RX ORDER — IPRATROPIUM BROMIDE AND ALBUTEROL SULFATE 2.5; .5 MG/3ML; MG/3ML
3 SOLUTION RESPIRATORY (INHALATION) ONCE AS NEEDED
Status: DISCONTINUED | OUTPATIENT
Start: 2023-08-22 | End: 2023-08-22 | Stop reason: HOSPADM

## 2023-08-22 RX ORDER — FOLIC ACID 1 MG/1
1 TABLET ORAL DAILY
Status: DISCONTINUED | OUTPATIENT
Start: 2023-08-23 | End: 2023-08-23 | Stop reason: HOSPADM

## 2023-08-22 RX ORDER — ONDANSETRON 2 MG/ML
4 INJECTION INTRAMUSCULAR; INTRAVENOUS ONCE AS NEEDED
Status: DISCONTINUED | OUTPATIENT
Start: 2023-08-22 | End: 2023-08-22

## 2023-08-22 RX ORDER — HYDRALAZINE HYDROCHLORIDE 20 MG/ML
5 INJECTION INTRAMUSCULAR; INTRAVENOUS
Status: DISCONTINUED | OUTPATIENT
Start: 2023-08-22 | End: 2023-08-22 | Stop reason: HOSPADM

## 2023-08-22 RX ORDER — OXYCODONE HYDROCHLORIDE 5 MG/1
10 TABLET ORAL EVERY 4 HOURS PRN
Status: DISCONTINUED | OUTPATIENT
Start: 2023-08-22 | End: 2023-08-22

## 2023-08-22 RX ORDER — ATORVASTATIN CALCIUM 40 MG/1
40 TABLET, FILM COATED ORAL NIGHTLY
Status: DISCONTINUED | OUTPATIENT
Start: 2023-08-22 | End: 2023-08-23 | Stop reason: HOSPADM

## 2023-08-22 RX ORDER — FENTANYL CITRATE 50 UG/ML
INJECTION, SOLUTION INTRAMUSCULAR; INTRAVENOUS AS NEEDED
Status: DISCONTINUED | OUTPATIENT
Start: 2023-08-22 | End: 2023-08-22 | Stop reason: SURG

## 2023-08-22 RX ORDER — MELOXICAM 15 MG/1
15 TABLET ORAL ONCE
Status: COMPLETED | OUTPATIENT
Start: 2023-08-22 | End: 2023-08-22

## 2023-08-22 RX ORDER — LEVOTHYROXINE SODIUM 0.07 MG/1
75 TABLET ORAL DAILY
Status: DISCONTINUED | OUTPATIENT
Start: 2023-08-22 | End: 2023-08-22

## 2023-08-22 RX ORDER — SODIUM CHLORIDE, SODIUM LACTATE, POTASSIUM CHLORIDE, CALCIUM CHLORIDE 600; 310; 30; 20 MG/100ML; MG/100ML; MG/100ML; MG/100ML
1000 INJECTION, SOLUTION INTRAVENOUS CONTINUOUS
Status: DISCONTINUED | OUTPATIENT
Start: 2023-08-22 | End: 2023-08-23 | Stop reason: HOSPADM

## 2023-08-22 RX ORDER — SUCCINYLCHOLINE CHLORIDE 20 MG/ML
INJECTION INTRAMUSCULAR; INTRAVENOUS AS NEEDED
Status: DISCONTINUED | OUTPATIENT
Start: 2023-08-22 | End: 2023-08-22 | Stop reason: SURG

## 2023-08-22 RX ORDER — DEXAMETHASONE SODIUM PHOSPHATE 4 MG/ML
INJECTION, SOLUTION INTRA-ARTICULAR; INTRALESIONAL; INTRAMUSCULAR; INTRAVENOUS; SOFT TISSUE AS NEEDED
Status: DISCONTINUED | OUTPATIENT
Start: 2023-08-22 | End: 2023-08-22 | Stop reason: SURG

## 2023-08-22 RX ORDER — OXYCODONE HCL 10 MG/1
10 TABLET, FILM COATED, EXTENDED RELEASE ORAL ONCE
Status: COMPLETED | OUTPATIENT
Start: 2023-08-22 | End: 2023-08-22

## 2023-08-22 RX ORDER — DIPHENHYDRAMINE HYDROCHLORIDE 50 MG/ML
12.5 INJECTION INTRAMUSCULAR; INTRAVENOUS
Status: DISCONTINUED | OUTPATIENT
Start: 2023-08-22 | End: 2023-08-22 | Stop reason: HOSPADM

## 2023-08-22 RX ORDER — ONDANSETRON 4 MG/1
4 TABLET, FILM COATED ORAL EVERY 6 HOURS PRN
Status: DISCONTINUED | OUTPATIENT
Start: 2023-08-22 | End: 2023-08-23 | Stop reason: HOSPADM

## 2023-08-22 RX ORDER — OXYCODONE HYDROCHLORIDE 5 MG/1
5 TABLET ORAL ONCE AS NEEDED
Status: COMPLETED | OUTPATIENT
Start: 2023-08-22 | End: 2023-08-22

## 2023-08-22 RX ORDER — OXYCODONE HYDROCHLORIDE 5 MG/1
7.5 TABLET ORAL EVERY 4 HOURS PRN
Status: DISCONTINUED | OUTPATIENT
Start: 2023-08-22 | End: 2023-08-23 | Stop reason: HOSPADM

## 2023-08-22 RX ORDER — ALBUTEROL SULFATE 2.5 MG/3ML
2.5 SOLUTION RESPIRATORY (INHALATION) EVERY 6 HOURS PRN
Status: DISCONTINUED | OUTPATIENT
Start: 2023-08-22 | End: 2023-08-23 | Stop reason: HOSPADM

## 2023-08-22 RX ORDER — ONDANSETRON 2 MG/ML
4 INJECTION INTRAMUSCULAR; INTRAVENOUS EVERY 6 HOURS PRN
Status: DISCONTINUED | OUTPATIENT
Start: 2023-08-22 | End: 2023-08-23 | Stop reason: HOSPADM

## 2023-08-22 RX ORDER — TAMSULOSIN HYDROCHLORIDE 0.4 MG/1
0.4 CAPSULE ORAL DAILY
Status: DISCONTINUED | OUTPATIENT
Start: 2023-08-23 | End: 2023-08-23 | Stop reason: HOSPADM

## 2023-08-22 RX ORDER — PROPOFOL 10 MG/ML
VIAL (ML) INTRAVENOUS CONTINUOUS PRN
Status: DISCONTINUED | OUTPATIENT
Start: 2023-08-22 | End: 2023-08-22 | Stop reason: SURG

## 2023-08-22 RX ORDER — SODIUM CHLORIDE 9 MG/ML
75 INJECTION, SOLUTION INTRAVENOUS CONTINUOUS
Status: DISCONTINUED | OUTPATIENT
Start: 2023-08-22 | End: 2023-08-23 | Stop reason: HOSPADM

## 2023-08-22 RX ORDER — LIDOCAINE HYDROCHLORIDE 10 MG/ML
INJECTION, SOLUTION EPIDURAL; INFILTRATION; INTRACAUDAL; PERINEURAL AS NEEDED
Status: DISCONTINUED | OUTPATIENT
Start: 2023-08-22 | End: 2023-08-22 | Stop reason: SURG

## 2023-08-22 RX ORDER — FLUMAZENIL 0.1 MG/ML
0.2 INJECTION INTRAVENOUS AS NEEDED
Status: DISCONTINUED | OUTPATIENT
Start: 2023-08-22 | End: 2023-08-22 | Stop reason: HOSPADM

## 2023-08-22 RX ORDER — ACETAMINOPHEN 500 MG
1000 TABLET ORAL ONCE
Status: COMPLETED | OUTPATIENT
Start: 2023-08-22 | End: 2023-08-22

## 2023-08-22 RX ADMIN — CEFAZOLIN 2 G: 2 INJECTION, POWDER, FOR SOLUTION INTRAMUSCULAR; INTRAVENOUS at 07:55

## 2023-08-22 RX ADMIN — OXYCODONE HYDROCHLORIDE 7.5 MG: 5 TABLET ORAL at 18:26

## 2023-08-22 RX ADMIN — TRANEXAMIC ACID 1000 MG: 10 INJECTION, SOLUTION INTRAVENOUS at 09:33

## 2023-08-22 RX ADMIN — ONDANSETRON 4 MG: 2 INJECTION INTRAMUSCULAR; INTRAVENOUS at 07:19

## 2023-08-22 RX ADMIN — DEXAMETHASONE SODIUM PHOSPHATE 8 MG: 4 INJECTION, SOLUTION INTRAMUSCULAR; INTRAVENOUS at 08:27

## 2023-08-22 RX ADMIN — ONDANSETRON 4 MG: 2 INJECTION INTRAMUSCULAR; INTRAVENOUS at 08:50

## 2023-08-22 RX ADMIN — SUCCINYLCHOLINE CHLORIDE 60 MG: 20 INJECTION, SOLUTION INTRAMUSCULAR; INTRAVENOUS at 08:10

## 2023-08-22 RX ADMIN — Medication 100 MCG: at 09:14

## 2023-08-22 RX ADMIN — HYDROMORPHONE HYDROCHLORIDE 0.5 MG: 1 INJECTION, SOLUTION INTRAMUSCULAR; INTRAVENOUS; SUBCUTANEOUS at 11:14

## 2023-08-22 RX ADMIN — GABAPENTIN 400 MG: 400 CAPSULE ORAL at 21:07

## 2023-08-22 RX ADMIN — ATORVASTATIN CALCIUM 40 MG: 40 TABLET, FILM COATED ORAL at 21:07

## 2023-08-22 RX ADMIN — Medication 25 MG: at 08:16

## 2023-08-22 RX ADMIN — EPHEDRINE SULFATE 10 MG: 5 INJECTION INTRAVENOUS at 08:31

## 2023-08-22 RX ADMIN — EPHEDRINE SULFATE 5 MG: 5 INJECTION INTRAVENOUS at 08:06

## 2023-08-22 RX ADMIN — OXYCODONE HYDROCHLORIDE 5 MG: 5 TABLET ORAL at 10:59

## 2023-08-22 RX ADMIN — ACETAMINOPHEN 1000 MG: 500 TABLET, FILM COATED ORAL at 07:13

## 2023-08-22 RX ADMIN — Medication 200 MCG: at 08:55

## 2023-08-22 RX ADMIN — TRANEXAMIC ACID 1000 MG: 10 INJECTION, SOLUTION INTRAVENOUS at 08:01

## 2023-08-22 RX ADMIN — HYDROMORPHONE HYDROCHLORIDE 0.5 MG: 1 INJECTION, SOLUTION INTRAMUSCULAR; INTRAVENOUS; SUBCUTANEOUS at 10:46

## 2023-08-22 RX ADMIN — SODIUM CHLORIDE 1000 ML: 9 INJECTION, SOLUTION INTRAVENOUS at 10:48

## 2023-08-22 RX ADMIN — OXYCODONE HYDROCHLORIDE 10 MG: 10 TABLET, FILM COATED, EXTENDED RELEASE ORAL at 07:13

## 2023-08-22 RX ADMIN — MAGNESIUM SULFATE HEPTAHYDRATE 1 G: 500 INJECTION, SOLUTION INTRAMUSCULAR; INTRAVENOUS at 08:20

## 2023-08-22 RX ADMIN — MELOXICAM 15 MG: 15 TABLET ORAL at 07:14

## 2023-08-22 RX ADMIN — Medication 200 MCG: at 08:59

## 2023-08-22 RX ADMIN — Medication 200 MCG: at 08:22

## 2023-08-22 RX ADMIN — SODIUM CHLORIDE 75 ML/HR: 9 INJECTION, SOLUTION INTRAVENOUS at 18:25

## 2023-08-22 RX ADMIN — HYDROMORPHONE HYDROCHLORIDE 0.5 MG: 1 INJECTION, SOLUTION INTRAMUSCULAR; INTRAVENOUS; SUBCUTANEOUS at 10:33

## 2023-08-22 RX ADMIN — CALCIUM CHLORIDE 0.5 G: 100 INJECTION INTRAVENOUS; INTRAVENTRICULAR at 09:11

## 2023-08-22 RX ADMIN — FENTANYL CITRATE 50 MCG: 50 INJECTION, SOLUTION INTRAMUSCULAR; INTRAVENOUS at 07:58

## 2023-08-22 RX ADMIN — EPHEDRINE SULFATE 10 MG: 5 INJECTION INTRAVENOUS at 08:21

## 2023-08-22 RX ADMIN — Medication 100 MCG: at 08:20

## 2023-08-22 RX ADMIN — LIDOCAINE HYDROCHLORIDE 100 MG: 10 INJECTION, SOLUTION EPIDURAL; INFILTRATION; INTRACAUDAL; PERINEURAL at 07:58

## 2023-08-22 RX ADMIN — OXYCODONE HYDROCHLORIDE 7.5 MG: 5 TABLET ORAL at 13:39

## 2023-08-22 RX ADMIN — PROPOFOL 150 MCG/KG/MIN: 10 INJECTION, EMULSION INTRAVENOUS at 07:58

## 2023-08-22 RX ADMIN — Medication 200 MCG: at 08:25

## 2023-08-22 RX ADMIN — SODIUM CHLORIDE, POTASSIUM CHLORIDE, SODIUM LACTATE AND CALCIUM CHLORIDE 1000 ML: 600; 310; 30; 20 INJECTION, SOLUTION INTRAVENOUS at 07:14

## 2023-08-22 RX ADMIN — EPHEDRINE SULFATE 10 MG: 5 INJECTION INTRAVENOUS at 09:17

## 2023-08-22 RX ADMIN — CEFAZOLIN 2000 MG: 2 INJECTION, POWDER, FOR SOLUTION INTRAMUSCULAR; INTRAVENOUS at 18:26

## 2023-08-22 RX ADMIN — FENTANYL CITRATE 50 MCG: 50 INJECTION, SOLUTION INTRAMUSCULAR; INTRAVENOUS at 09:35

## 2023-08-22 NOTE — H&P
History & Physical       Patient: Duke Ferire    Date of Admission: 8/22/2023  5:48 AM    YOB: 1960    Medical Record Number: [id number]    Attending Physician: Wilber Mccullough MD        Chief Complaints: Primary osteoarthritis of right hip [M16.11]      History of Present Illness: 62 y.o. male presents with Primary osteoarthritis of right hip [M16.11]. Onset of symptoms was gradual and progressive.  Symptoms are associated with pain in the right hip and femur.  Symptoms are aggravated by flexion of the hip.   Symptoms improve with using a cane . Patient is now being admitted to the services of Wilber Mccullough MD for further evaluation and treatment.      No Known Allergies      Home Medications:  Medications Prior to Admission   Medication Sig Dispense Refill Last Dose    atorvastatin (LIPITOR) 40 MG tablet Take 1 tablet by mouth Every Night. 90 tablet 3 8/21/2023    folic acid (FOLVITE) 1 MG tablet Take 1 tablet by mouth Daily.   Past Week    gabapentin (NEURONTIN) 400 MG capsule Take 1 capsule by mouth 3 (Three) Times a Day.   8/21/2023    HYDROcodone-acetaminophen (NORCO) 5-325 MG per tablet Take 1 tablet by mouth Every 6 (Six) Hours As Needed.   8/22/2023 at 0100    levothyroxine (SYNTHROID, LEVOTHROID) 75 MCG tablet Take 1 tablet by mouth Daily. (Patient taking differently: Take 125 mcg by mouth Daily.) 90 tablet 1 8/21/2023    midodrine (PROAMATINE) 2.5 MG tablet Take 1 tablet by mouth 3 (Three) Times a Day Before Meals.   8/21/2023    tamsulosin (FLOMAX) 0.4 MG capsule 24 hr capsule Take 1 capsule by mouth Daily.   8/21/2023    acetaminophen (TYLENOL) 650 MG 8 hr tablet Take 1 tablet by mouth Every 8 (Eight) Hours As Needed for Mild Pain.   More than a month    albuterol sulfate  (90 Base) MCG/ACT inhaler Inhale 1 puff Daily As Needed.   Unknown    apixaban (ELIQUIS) 2.5 MG tablet tablet Take 1 tablet by mouth 2 (Two) Times a Day for 30 days. 60 tablet 0     clopidogrel (PLAVIX) 75  MG tablet Take 1 tablet by mouth Daily. 90 tablet 3     lidocaine (LIDODERM) 5 % Place 1 patch on the skin as directed by provider Daily. Remove & Discard patch within 12 hours or as directed by MD       ondansetron (Zofran) 4 MG tablet Take 1 tablet by mouth Every 8 (Eight) Hours As Needed for Nausea or Vomiting. 20 tablet 0     oxyCODONE-acetaminophen (PERCOCET) 7.5-325 MG per tablet  1-2 Oral Q4H PRN severe pain 45 tablet 0     sennosides-docusate (senna-docusate sodium) 8.6-50 MG per tablet Take 2 tablets by mouth 2 (Two) Times a Day for 30 days. 120 tablet 0        Current Medications:  Scheduled Meds:ceFAZolin, 2 g, Intravenous, Once  tranexamic acid, 1,000 mg, Intravenous, Once  tranexamic acid, 1,000 mg, Intravenous, Once      Continuous Infusions:lactated ringers, 1,000 mL, Last Rate: 1,000 mL (08/22/23 0714)      PRN Meds:.  lidocaine    ondansetron    sodium chloride       Past Medical History:   Diagnosis Date    Anesthesia complication     pt does not believe he was completely sedated during CABG     he still feels incisional pain and has flashbacks of the cut    Asthma     pt is unsure of diagnosis    Coronary artery disease     Dyslipidemia     Essential hypertension     hx    Hyperlipidemia     Hyperthyroidism     Hypothyroidism     PONV (postoperative nausea and vomiting)     Primary osteoarthritis of right hip 07/05/2023        Past Surgical History:   Procedure Laterality Date    CARDIAC CATHETERIZATION Left 12/04/2019    Procedure: Left Heart Cath;  Surgeon: James Hernandez MD;  Location: [facility] CATH INVASIVE LOCATION;  Service: Cardiovascular    CARDIAC CATHETERIZATION N/A 12/04/2019    Procedure: Coronary angiography;  Surgeon: James Hernandez MD;  Location: [facility] CATH INVASIVE LOCATION;  Service: Cardiovascular    CARDIAC SURGERY      CORONARY ANGIOPLASTY WITH STENT PLACEMENT      KRISTEN: mid LAD & Ostial diagonal    CORONARY ARTERY BYPASS GRAFT  12/06/2019    2 Vessels:  LIMA to distal LAD    CORONARY ARTERY BYPASS GRAFT N/A 12/06/2019    Procedure: CORONARY ARTERY BYPASS GRAFTING;  Surgeon: Laverne Boyle MD;  Location: [facility];  Service: Cardiothoracic    INNER EAR SURGERY  1985        Social History     Occupational History    Not on file   Tobacco Use    Smoking status: Never    Smokeless tobacco: Never   Substance and Sexual Activity    Alcohol use: Never    Drug use: Never    Sexual activity: Yes     Partners: Female      Social History     Social History Narrative    Not on file        Family History   Problem Relation Age of Onset    Diabetes Mother     Diabetes Sister     No Known Problems Father     No Known Problems Brother     No Known Problems Maternal Aunt     No Known Problems Maternal Uncle     No Known Problems Paternal Aunt     No Known Problems Paternal Uncle     No Known Problems Maternal Grandmother     No Known Problems Maternal Grandfather     No Known Problems Paternal Grandmother     No Known Problems Paternal Grandfather     No Known Problems Other     Anemia Neg Hx     Arrhythmia Neg Hx     Asthma Neg Hx     Clotting disorder Neg Hx     Fainting Neg Hx     Heart attack Neg Hx     Heart disease Neg Hx     Heart failure Neg Hx     Hyperlipidemia Neg Hx     Hypertension Neg Hx          Review of Systems:   HEENT: Patient denies any headaches, vision changes, change in hearing, or tinnitus. Patient denies any rhinorrhea,epistaxis, sinus pain, mouth or dental problems, sore throat or hoarseness, or dysphagia  Pulmonary: Patient denies any cough, congestion, SOA, or wheezing  Cardiovascular: Patient denies any chest pain, dyspnea, palpitations, weakness, intolerance of exercise, varicosities, swelling of extremities, known murmur  Gastrointestinal:  Patient denies nausea, vomiting, diarrhea, constipation, loss  of appetite, change in appetite, dysphagia, gas, heartburn, melena, change in bowel habits, use of laxatives or other drugs to alter the function  "of the gastrointestinal tract.  Genital/Urinary: Patient denies dysuria, change in color of urine, change in frequency of urination, pain with urgency, incontinence, retention, or nocturia.  Musculoskeletal: Patient denies increased warmth; redness; or swelling of joints; limitation of function; deformity; crepitation: pain in a joint or an extremity, the neck, or the back, especially with movement.  Neurological: Patient denies dizziness, tremor, ataxia, difficulty in speaking, change in speech, paresthesia, loss of sensation, seizures, syncope, changes in memory.  Endocrine system: Patient denies tremors, palpitations, intolerance of heat or cold, polyuria, polydipsia, polyphagia, diaphoresis, exophthalmos, or goiter.  Psychological: Patient denies thoughts/plans of harming self or other; depression,  insomnia, night terrors, janet, memory loss, disorientation.  Skin: Patient denies any bruising, rashes, discoloration, pruritus, wounds, ulcers, decubiti, changes in the hair or nails  Hematopoietic: Patient denies history of spontaneous or excessive bleeding, epistaxis, hematuria, melena, fatigue, enlarged or tender lymph nodes, pallor, history of anemia.    Physical Exam: 62 y.o. male  Vitals:    08/22/23 0643   BP: 136/92   BP Location: Right arm   Pulse: 85   Resp: 23   Temp: 98.2 øF (36.8 øC)   TempSrc: Oral   SpO2: 99%   Weight: 98 kg (216 lb)   Height: 177.8 cm (70\")       General Appearance:          Alert, cooperative, in no acute distress                                                 Head:    Normocephalic, without obvious abnormality, atraumatic   Eyes:            Lids and lashes normal, conjunctivae and sclerae normal, no   icterus, no pallor, corneas clear, PERRLA   Ears:    Ears appear intact with no abnormalities noted   Throat:   No oral lesions, no thrush, oral mucosa moist   Neck:   No adenopathy, supple, trachea midline, no thyromegaly, no   carotid bruit, no JVD   Back:     No kyphosis present, " no scoliosis present, no skin lesions,      erythema or scars, no tenderness to percussion or                   palpation,   range of motion normal   Lungs:     Clear to auscultation,respirations regular, even and                  unlabored    Heart:    Regular rhythm and normal rate, normal S1 and S2, no            murmur, no gallop, no rub, no click   Chest Wall:    No abnormalities observed   Abdomen:     Normal bowel sounds, no masses, no organomegaly, soft        nontender, nondistended, no guarding, no rebound                tenderness   Rectal:     Deferred   Extremities:   Tenderness over anterior aspect of the right hip  . Moves all extremities well, no edema,   no cyanosis, no redness   Pulses:   Pulses palpable and equal bilaterally   Skin:   No bleeding, bruising or rash   Lymph nodes:   No palpable adenopathy   Neurologic:   Cranial nerves 2 - 12 grossly intact, sensation intact, DTR       present and equal bilaterally      Right hip. Neurovascular status is intact. Patient has a distinct limp on the affected side. Internal and external rotations are associated with pain and discomfort. Anterior joint line pain and tenderness is significant. Stinchfield sign is positive. Figure of 4 sign is positive. Patient is unable to perform an active straight leg raise exam.  The patient is unable to lean over and get to their socks and shoes because of the hip pain and discomfort. The greater trochanter is tender. Crossover adduction test is positive. Cross body adduction is limited and painful for the patient. Patient has very significant limp and joint line tenderness anteriorly, posteriorly and medially. Dorsalis pedis and posterior tibial artery pulses are palpable. Common peroneal nerve function is well preserved.      Diagnostic Tests:  No visits with results within 2 Day(s) from this visit.   Latest known visit with results is:   Hospital Outpatient Visit on 08/16/2023   Component Date Value Ref Range  Status    QT Interval 08/16/2023 429  ms Preliminary     No results found.      Assessment:    Primary osteoarthritis of right hip        Plan:  The patient voiced understanding of the risks, benefits, and alternative forms of treatment that were discussed and the patient consents to proceed with right total hip replacement.     The patient was seen today for preoperative discussion.  The patient has been tried on over-the-counter and prescription NSAID's despite the risks of anti-inflammatory bleeding, peptic ulcers and erosive gastritis with short term benefit only.  Braces have been prescribed for mechanical support.  Patient has been participating in an exercise program specifically targeting joint pain relief with limited benefit. Intraarticular injections have been used periodically with some but not complete relief of pain.  Ambulation aids have also been utilized.      The details of the surgical procedure were explained including the location of probable incisions and a description of the likely hardware/grafts to be used. The patient understands the likely convalescence after surgery as well as the rehabilitation required.  Also, we have thoroughly discussed with the patient the risks, benefits and alternatives to surgery.  Risks include but are not limited to the risk of infection, joint stiffness, limited range of motion, wound healing problems, scar tissue build up, myocardial infarction, stroke, blood clots (including DVT and/or pulmonary embolus along with the risk of death) neurologic and/or vascular injury, limb length discrepancy, fracture, dislocation, nonunion, malunion, continued pain and need for further surgery including hardware failure requiring revision.    Discharge Plan: tomorrow to home and home health      Date: 8/22/2023    Wilber Mccullough MD      DICTATED UTILIZING DRAGON DICTATION

## 2023-08-22 NOTE — DISCHARGE PLACEMENT REQUEST
"Duke Freire (62 y.o. Male)       Date of Birth   1960    Social Security Number       Address   [address] [place] [facility]    Home Phone   [phone]    MRN   [id number]       Mormon   None    Marital Status                               Admission Date   8/22/23    Admission Type   Elective    Admitting Provider   Wilber Mccullough MD    Attending Provider   Wilber Mccullough MD    Department, Room/Bed   [facility] MAIN OR, [facility] MAIN OR/MAIN OR       Discharge Date       Discharge Disposition       Discharge Destination                                 Attending Provider: Wilber Mccullough MD    Allergies: No Known Allergies    Isolation: None   Infection: None   Code Status: Prior    Ht: 177.8 cm (70\")   Wt: 98 kg (216 lb)    Admission Cmt: None   Principal Problem: Primary osteoarthritis of right hip [M16.11]                   Active Insurance as of 8/22/2023       Primary Coverage       Payor Plan Insurance Group Employer/Plan Group    [employer] [facility] [employer]        Payor Plan Address Payor Plan Phone Number Payor Plan Fax Number Effective Dates    [address] [phone]  5/10/2023 - None Entered    [facility] [id number]         Subscriber Name Subscriber Birth Date Member ID       DUKE FREIRE 1960 [id number]                     Emergency Contacts        (Rel.) Home Phone Work Phone Mobile Phone    CHRISTOPHER BURTON (Friend) -- -- [phone]    RUBIO FREIRE (Other) -- -- [phone]                "

## 2023-08-22 NOTE — OP NOTE
TOTAL HIP ARTHROPLASTY     PATIENT NAME:  Duke Freire     YOB: 1960     ATTENDING PHYSICIAN: Wilber Mccullough MD     DATE OF PROCEDURE: 8/22/2023     PREOPERATIVE DIAGNOSIS: Primary osteoarthritis of right hip [M16.11]     Post-Op Diagnosis Codes:     * Primary osteoarthritis of right hip [M16.11]    PRINCIPAL DIAGNOSIS: Severe degenerative osteoarthritis right hip.    PROCEDURE: Right total hip arthroplasty, direct anterior.    SURGICAL APPROACH: Hip Direct Anterior (Smith-Galeas)     SURGEON:  Wilber Mccullough M.D.    ASSISTANT: first Murali assistant was responsible for performing the following activities: Retraction, Suction, Irrigation, Suturing, Closing, and Placing Dressing and their skilled assistance was necessary for the success of this case.       ANESTHESIOLOGIST: Dr. Justice Best MD    ANESTHESIA: General with an LMA.    POSITION: Supine on a [place] table.    DRAINS: None.    COMPLICATIONS: None.    ESTIMATED BLOOD LOSS: 300 mL    SPECIMENS:   Order Name Source Comment Collection Info Order Time   TYPE AND SCREEN   Collected By: Beatriz Azul 8/16/2023  9:38 AM       IMPLANT USED: [facility] press-fit total hip replacement system, a #7 Avenir complete collared HA-coated stem with a standard neck length and an extended offset with a 40 mm diameter ceramic Biolox head, 0 mm neck length, 60 mm [employer] G-7 Osseoti TM coated multi-hole cup with a single dome screw in the posterosuperior quadrant with a highly cross-linked, Vitamin E impregnated, polyethylene liner neutral.    INDICATION: Severe pain and discomfort in the right hip and groin with radiation to the proximal femur.  All risks and benefits, potential complications, possibility of death, infection, myocardial infarction, DVT, pulmonary embolism, wound and soft tissue breakdown, dislocation of the prosthesis, limb length discrepancy, etc. were all discussed with the patient and an informed consent was signed.     DETAILS OF  PROCEDURE: Surgical timeout was called. Operative extremity was correctly identified in the operating room suite. Patient was given antibiotics per the SCIP protocol.  Patient was placed under appropriate anesthetic. C-arm was used through the entire procedure to accurately evaluate the limb lengths and the stability of the components as well as appropriate positioning of the components in the proximal femur and the acetabulum.  Patient was placed on the [place] table, prepped and draped in a standard fashion.  A direct anterior approach was carried out.      The fascia over the TFL was incised. The TFL was retracted laterally. A Cobra retractor was placed on the superior aspect of the femoral neck. A second Cobra was placed on the inferior aspect of the femoral neck. The leg was manipulated and the anterior capsule was carefully identified. The rectus femoris was retracted medially. Distally, the vastus lateralis was mobilized with a Richards elevator and L-shaped capsulotomy was carried out and the flaps of the capsule were developed. FiberWire sutures were placed in the flaps of the capsule to allow for mobilization and subsequent closure of the soft tissue envelope. The Cobra retractors were placed subperiosteally, one inferior to the femoral neck and one superior to the femoral neck. The dissection was carried out up to the saddle of the greater trochanter laterally. The femoral osteotomy was carried out along the line of the broach. A napkin ring segment of femoral neck was removed to allow easy removal of the femoral head. A motorized corkscrew was used and the femoral head was removed from the acetabulum.    Retractors were placed around the acetabulum at the 4 o'clock to 7 o'clock and the 11 o'clock positions. Acetabular labrum was resected. The ligamentum teres was resected. Reaming was commenced under direct C-arm control.  Appropriate amounts of flexion, abduction and anteversion were built into the reaming  process. Smaller sized reamer was used and the smaller sized reamer was used to medialize the acetabulum up to the teardrop, which was visualized on the C-arm monitors. The reaming size went up progressively up until a good bed of bleeding subchondral bone was exposed. The acetabulum was then lavaged with bacitracin irrigating solution. Diluted Betadine was used in antibiotic solution and the multi-hole cup, 60 mm in diameter with TM outer coating G-7 Osseoti design was then impacted and locked into position on the native acetabulum. Appropriate amounts of flexion, abduction and anteversion were built into the positioning of the cup. A screw was placed in the posterosuperior quadrant to augment the stability of the cup. The neutral polyethylene liner, Vitamin E impregnated, highly cross-linked was impacted and locked into position as well.    Attention was then directed towards the proximal femur.  The proximal femur was delivered into the wound with a combination of adduction, extension and external rotation.  The pigtail device was placed subperiosteally under the proximal femur and attached to the [place] table to stabilize the femur for instrumentation.  The piriformis fossa was cleared of all soft tissue.  The cancellous bone on the internal aspect of the lateral femur was removed with a curette to minimize the malpositioning of the broaches into varus.  The rat tailed device was then used to enter into the femoral canal.  We then commenced broaching with the smallest broach.  The broach was leaned up against the lateral cortex to minimize varus-valgus malposition.  We broached up to the point that the proximal metaphysis was well filled and there was good torsional stability to the broach.  A #7 broach with a high offset and a standard neck length was found to be the best fit.  A trial head and neck was placed and the femur was reduced into the acetabulum.  The limb lengths were checked and found to be  anatomically accurate.  Intraoperative C-arm images were obtained to determine the limb lengths and the positioning of the components.  These were found to be anatomically accurate.  The femoral canal was then lavaged with bacitracin irrigating solution and dried.  The femoral component, a #7 Avenir complete collared HA-coated stem with a standard neck length and an extended offset implant design was impacted into position and a good fit was obtained.  There was good torsional stability.  Some bone graft was packed around the femoral component to promote ingrowth.  The trunnion of the femoral component was dried and the ceramic Biolox, 40 mm diameter with a 0 mm neck length head ball was impacted and locked onto the femoral component.  Final reduction was carried out.  The stability and limb length were checked one more time.  There was no tendency towards dislocation of the components in any position of the lower extremity.  Limb lengths were checked on C-arm images found to be anatomical.  The capsule and the subperiosteal structures were infiltrated with an analgesic for postoperative analgesia.  The anterior capsule was repaired with interrupted sutures.  The fascia over the TFL was repaired with interrupted suture.  Subcuticular sutures applied.  Occlusive dressing was applied.  Sponge gauze and needle count was correct.  No complications were encountered.  Patient was reversed from the anesthetic and taken from the operating room to the recovery room in stable condition.  No complications encountered.  I discussed the satisfactory performance of this procedure with the patient's family and answered all questions for them.     Wilber Mccullough MD  8/22/2023  10:19 EDT

## 2023-08-22 NOTE — ANESTHESIA PREPROCEDURE EVALUATION
Anesthesia Evaluation     Patient summary reviewed and Nursing notes reviewed   history of anesthetic complications:  PONV  NPO Solid Status: > 8 hours  NPO Liquid Status: > 8 hours           Airway   Mallampati: I  TM distance: >3 FB  Neck ROM: full  No difficulty expected  Dental - normal exam     Pulmonary - normal exam   (+) asthma,  Cardiovascular - normal exam    (+) hypertension, CAD, CABG, angina, hyperlipidemia      Neuro/Psych  (+) psychiatric history Anxiety  GI/Hepatic/Renal/Endo    (+) thyroid problem hypothyroidism    Musculoskeletal     Abdominal  - normal exam    Bowel sounds: normal.   Substance History - negative use     OB/GYN negative ob/gyn ROS         Other   arthritis,                     Anesthesia Plan    ASA 3     general     intravenous induction     Anesthetic plan, risks, benefits, and alternatives have been provided, discussed and informed consent has been obtained with: patient.    Use of blood products discussed with patient  Consented to blood products.    Plan discussed with CRNA.

## 2023-08-22 NOTE — THERAPY RE-EVALUATION
Patient Name: Duke Freire  : 1960    MRN: [id number]                              Today's Date: 2023       Admit Date: 2023    Visit Dx:     ICD-10-CM ICD-9-CM   1. Primary osteoarthritis of right hip  M16.11 715.15     Patient Active Problem List   Diagnosis    Abnormal nuclear stress test    Angina pectoris    Coronary artery disease involving native coronary artery of native heart with unstable angina pectoris    Essential hypertension    Coronary artery disease    S/P CABG x 2    Abnormal result of cardiovascular function study    Anxiety    Erectile dysfunction    Hyperlipidemia    Coronary heart disease    Colon cancer screening    Primary osteoarthritis of right hip     Past Medical History:   Diagnosis Date    Anesthesia complication     pt does not believe he was completely sedated during CABG     he still feels incisional pain and has flashbacks of the cut    Asthma     pt is unsure of diagnosis    Coronary artery disease     Dyslipidemia     Essential hypertension     hx    Hyperlipidemia     Hyperthyroidism     Hypothyroidism     PONV (postoperative nausea and vomiting)     Primary osteoarthritis of right hip 2023     Past Surgical History:   Procedure Laterality Date    CARDIAC CATHETERIZATION Left 2019    Procedure: Left Heart Cath;  Surgeon: James Hernandez MD;  Location: [facility] CATH INVASIVE LOCATION;  Service: Cardiovascular    CARDIAC CATHETERIZATION N/A 2019    Procedure: Coronary angiography;  Surgeon: James Hernandez MD;  Location: [facility] CATH INVASIVE LOCATION;  Service: Cardiovascular    CARDIAC SURGERY      CORONARY ANGIOPLASTY WITH STENT PLACEMENT      KRISTEN: mid LAD & Ostial diagonal    CORONARY ARTERY BYPASS GRAFT  2019    2 Vessels: LIMA to distal LAD    CORONARY ARTERY BYPASS GRAFT N/A 2019    Procedure: CORONARY ARTERY BYPASS GRAFTING;  Surgeon: Laverne Boyle MD;  Location: [facility] CVOR;  Service:  Cardiothoracic    INNER EAR SURGERY  1985      General Information       Row Name 08/22/23 1633          Physical Therapy Time and Intention    Document Type re-evaluation  -MB     Mode of Treatment physical therapy  -MB       Row Name 08/22/23 1633          General Information    Patient Profile Reviewed yes  -MB     Prior Level of Function --  See initial eval for PLOF and Home Environment  -MB       Row Name 08/22/23 1633          Cognition    Orientation Status (Cognition) oriented x 4  -MB       Row Name 08/22/23 1633          Safety Issues, Functional Mobility    Impairments Affecting Function (Mobility) balance;endurance/activity tolerance;pain;range of motion (ROM);strength  -MB               User Key  (r) = Recorded By, (t) = Taken By, (c) = Cosigned By      Initials Name Provider Type    MB Cali Romero, PT Physical Therapist                   Mobility       Row Name 08/22/23 1634          Bed Mobility    Bed Mobility bed mobility (all) activities  -MB     All Activities, [place] (Bed Mobility) minimum assist (75% patient effort)  -MB     Assistive Device (Bed Mobility) bed rails;head of bed elevated  -MB       Row Name 08/22/23 1634          Sit-Stand Transfer    Sit-Stand [place] (Transfers) minimum assist (75% patient effort)  -MB     Assistive Device (Sit-Stand Transfers) walker, 4-wheeled  -MB       Row Name 08/22/23 1634          Gait/Stairs (Locomotion)    [place] Level (Gait) contact guard  -MB     Assistive Device (Gait) walker, front-wheeled  -MB     Patient was Unable to Ambulate --  Pt did ambulate POD 0  -MB     Distance in Feet (Gait) 50  -MB     Deviations/Abnormal Patterns (Gait) weight shifting decreased;stride length decreased;base of support, narrow;gait speed decreased  -MB     Right Sided Gait Deviations weight shift ability decreased  -MB     Comment, (Gait/Stairs) 50' CGA with RW  -MB       Row Name 08/22/23 1634          Mobility    Extremity Weight-bearing Status  right lower extremity  -MB     Right Lower Extremity (Weight-bearing Status) weight-bearing as tolerated (WBAT)  -MB               User Key  (r) = Recorded By, (t) = Taken By, (c) = Cosigned By      Initials Name Provider Type    Cali Flynn, PT Physical Therapist                   Obj/Interventions       Row Name 08/22/23 1635          Range of Motion Comprehensive    Comment, General Range of Motion RLE AROM WFL  -MB       Row Name 08/22/23 1635          Strength Comprehensive (MMT)    Comment, General Manual Muscle Testing (MMT) Assessment RLE Strength grossly 3+/5 grossly, limited by pain  -MB       Row Name 08/22/23 1635          Balance    Balance Assessment sitting static balance;sitting dynamic balance;sit to stand dynamic balance;standing static balance;standing dynamic balance  -MB     Static Sitting Balance independent  -MB     Dynamic Sitting Balance independent  -MB     Position, Sitting Balance unsupported;sitting edge of bed  -MB     Sit to Stand Dynamic Balance contact guard  -MB     Static Standing Balance contact guard  -MB     Dynamic Standing Balance contact guard  -MB       Row Name 08/22/23 1635          Sensory Assessment (Somatosensory)    Sensory Assessment (Somatosensory) other (see comments)  Pt complains of moderate numbness on the R patellar region  -MB               User Key  (r) = Recorded By, (t) = Taken By, (c) = Cosigned By      Initials Name Provider Type    Cali Flynn, PT Physical Therapist                   Goals/Plan       Row Name 08/22/23 1636          Bed Mobility Goal 1 (PT)    Activity/Assistive Device (Bed Mobility Goal 1, PT) bed mobility activities, all  -MB     [place] Level/Cues Needed (Bed Mobility Goal 1, PT) standby assist  -MB     Time Frame (Bed Mobility Goal 1, PT) long term goal (LTG);2 weeks  -MB       Row Name 08/22/23 1639          Transfer Goal 1 (PT)    Activity/Assistive Device (Transfer Goal 1, PT) transfers, all;walker, rolling  -MB      [place] Level/Cues Needed (Transfer Goal 1, PT) contact guard required  -MB     Time Frame (Transfer Goal 1, PT) long term goal (LTG);2 weeks  -MB       Row Name 08/22/23 1639          Gait Training Goal 1 (PT)    Activity/Assistive Device (Gait Training Goal 1, PT) gait (walking locomotion);walker, rolling  -MB     [place] Level (Gait Training Goal 1, PT) standby assist  -MB     Distance (Gait Training Goal 1, PT) 100  -MB     Time Frame (Gait Training Goal 1, PT) long term goal (LTG);2 weeks  -MB       Row Name 08/22/23 1639          Stairs Goal 1 (PT)    Activity/Assistive Device (Stairs Goal 1, PT) stairs, all skills  -MB     [place] Level/Cues Needed (Stairs Goal 1, PT) standby assist  -MB     Number of Stairs (Stairs Goal 1, PT) 6  -MB     Time Frame (Stairs Goal 1, PT) long term goal (LTG);2 weeks  -MB       Row Name 08/22/23 1639          Therapy Assessment/Plan (PT)    Planned Therapy Interventions (PT) balance training;bed mobility training;gait training;home exercise program;neuromuscular re-education;patient/family education;strengthening;stair training;transfer training  -MB               User Key  (r) = Recorded By, (t) = Taken By, (c) = Cosigned By      Initials Name Provider Type    Cali Flynn, PT Physical Therapist                   Clinical Impression       Row Name 08/22/23 1636          Pain    Pretreatment Pain Rating 3/10  -MB     Posttreatment Pain Rating 3/10  -MB     Pain Location - Side/Orientation Right  -MB     Pain Location - hip  -MB       Row Name 08/22/23 1647 08/22/23 1636       Plan of Care Review    Plan of Care Reviewed With -- patient  -MB    Progress -- improving  -MB    Outcome Evaluation Pt is a 63 y/o M admitted to [facility] on 8/22/23 for elective R hip aTHA performed by Dr. Mccullough. Pt had been diagnosed with severe OA of the R hip and had failed conservative management, electing for surgical intervention at this date. PMH includes HTN, CAD s/p CABG, and OA. He  is currently living with 13 y/o daughter in mobile home with 6 step entry. At baseline, pt is IND with household mobility, community ambulation, and ADLs with no AD. He has previously been utilizing single crutch on the R side for pain relief. Currently pt is completing bed mobility min A, transfers, min A, and amb 50' CGA with RW. Pt shows no safety concerns with activity completion at this date, but is very anxious about recovery and asking many questions about [id number]. He was educated on ambulation distance/intensity, icing protocol, HEP completion, and AD needed following d/c. Pt should progress well with therapy during stay and plan is still to d/c back home with HHPT services. He will req RW prior to d/c and may need to have conversations about adult providing assistance in the household, rather than the child. PT will follow during stay.  -MB --      Row Name 08/22/23 1636          Therapy Assessment/Plan (PT)    Rehab Potential (PT) good, to achieve stated therapy goals  -MB     Criteria for Skilled Interventions Met (PT) yes;meets criteria  -MB     Therapy Frequency (PT) 2 times/day  -MB     Predicted Duration of Therapy Intervention (PT) until d/c  -MB       Row Name 08/22/23 1636          Vital Signs    O2 Delivery Pre Treatment room air  -MB     O2 Delivery Intra Treatment room air  -MB     O2 Delivery Post Treatment room air  -MB     Pre Patient Position Supine  -MB     Intra Patient Position Standing  -MB     Post Patient Position Supine  -MB       Row Name 08/22/23 1636          Positioning and Restraints    Pre-Treatment Position in bed  -MB     Post Treatment Position bed  -MB     In Bed notified nsg;supine;encouraged to call for assist  -MB               User Key  (r) = Recorded By, (t) = Taken By, (c) = Cosigned By      Initials Name Provider Type    Cali Flynn, PT Physical Therapist                   Outcome Measures       Row Name 08/22/23 0825          How much help from  another person do you currently need...    Turning from your back to your side while in flat bed without using bedrails? 3  -MB     Moving from lying on back to sitting on the side of a flat bed without bedrails? 3  -MB     Moving to and from a bed to a chair (including a wheelchair)? 3  -MB     Standing up from a chair using your arms (e.g., wheelchair, bedside chair)? 3  -MB     Climbing 3-5 steps with a railing? 3  -MB     To walk in hospital room? 3  -MB     AM-PAC 6 Clicks Score (PT) 18  -MB     Highest level of mobility 6 --> Walked 10 steps or more  -MB       Row Name 08/22/23 1639          Functional Assessment    Outcome Measure Options AM-PAC 6 Clicks Basic Mobility (PT)  -MB               User Key  (r) = Recorded By, (t) = Taken By, (c) = Cosigned By      Initials Name Provider Type    Cali Flynn, PT Physical Therapist                                 Physical Therapy Education       Title: PT OT SLP Therapies (Done)       Topic: Physical Therapy (Done)       Point: Mobility training (Done)       Learning Progress Summary             Patient Acceptance, E,D,H, VU by CR at 8/16/2023 1122                         Point: Home exercise program (Done)       Learning Progress Summary             Patient Acceptance, E,D,H, VU by CR at 8/16/2023 1122                                         User Key       Initials Effective Dates Name Provider Type Discipline     06/16/21 -  Reyes, Carmela, PT Physical Therapist PT                  PT Recommendation and Plan  Planned Therapy Interventions (PT): balance training, bed mobility training, gait training, home exercise program, neuromuscular re-education, patient/family education, strengthening, stair training, transfer training  Plan of Care Reviewed With: patient  Progress: improving  Outcome Evaluation: Pt is a 61 y/o M admitted to [facility] on 8/22/23 for elective R hip aTHA performed by Dr. Mccullough. Pt had been diagnosed with severe OA of the R hip and had failed  conservative management, electing for surgical intervention at this date. PMH includes HTN, CAD s/p CABG, and OA. He is currently living with 13 y/o daughter in mobile home with 6 step entry. At baseline, pt is IND with household mobility, community ambulation, and ADLs with no AD. He has previously been utilizing single crutch on the R side for pain relief. Currently pt is completing bed mobility min A, transfers, min A, and amb 50' CGA with RW. Pt shows no safety concerns with activity completion at this date, but is very anxious about recovery and asking many questions about [id number]. He was educated on ambulation distance/intensity, icing protocol, HEP completion, and AD needed following d/c. Pt should progress well with therapy during stay and plan is still to d/c back home with HHPT services. He will req RW prior to d/c and may need to have conversations about adult providing assistance in the household, rather than the child. PT will follow during stay.     Time Calculation:   PT Evaluation Complexity  History, PT Evaluation Complexity: 1-2 personal factors and/or comorbidities  Examination of Body Systems (PT Eval Complexity): total of 3 or more elements  Clinical Presentation (PT Evaluation Complexity): evolving  Clinical Decision Making (PT Evaluation Complexity): moderate complexity  Overall Complexity (PT Evaluation Complexity): moderate complexity     PT Charges       Row Name 08/22/23 1640             Time Calculation    Start Time 1450  -MB      Stop Time 1517  -MB      Time Calculation (min) 27 min  -MB      PT Received On 08/22/23  -MB      PT - Next Appointment 08/23/23  -MB      PT Goal Re-Cert Due Date 09/05/23  -MB         Time Calculation- PT    Total Timed Code Minutes- PT 0 minute(s)  -MB                User Key  (r) = Recorded By, (t) = Taken By, (c) = Cosigned By      Initials Name Provider Type    Cali Flynn, PT Physical Therapist                  Therapy Charges for Today        Code Description Service Date Service Provider Modifiers Qty    [id number] HC PT RE-EVAL ESTABLISHED PLAN 2 8/22/2023 Cali Romero, PT GP 1            PT G-Codes  Outcome Measure Options: AM-PAC 6 Clicks Basic Mobility (PT)  AM-PAC 6 Clicks Score (PT): 18  PT Discharge Summary  Anticipated Discharge Disposition (PT): home with home health    Cali Romero, BRITTANY  8/22/2023

## 2023-08-22 NOTE — PLAN OF CARE
Goal Outcome Evaluation:  Plan of Care Reviewed With: patient        Progress: improving   Pt is a 61 y/o M admitted to [facility] on 8/22/23 for elective R hip aTHA performed by Dr. cMcullough. Pt had been diagnosed with severe OA of the R hip and had failed conservative management, electing for surgical intervention at this date. PMH includes HTN, CAD s/p CABG, and OA. He is currently living with 15 y/o daughter in mobile home with 6 step entry. At baseline, pt is IND with household mobility, community ambulation, and ADLs with no AD. He has previously been utilizing single crutch on the R side for pain relief. Currently pt is completing bed mobility min A, transfers, min A, and amb 50' CGA with RW. Pt shows no safety concerns with activity completion at this date, but is very anxious about recovery and asking many questions about [id number]. He was educated on ambulation distance/intensity, icing protocol, HEP completion, and AD needed following d/c. Pt should progress well with therapy during stay and plan is still to d/c back home with HHPT services. He will req RW prior to d/c and may need to have conversations about adult providing assistance in the household, rather than the child. PT will follow during stay.    Anticipated Discharge Disposition (PT): home with home health

## 2023-08-22 NOTE — ANESTHESIA PROCEDURE NOTES
Airway  Urgency: elective    Date/Time: 8/22/2023 8:09 AM  End Time:8/22/2023 8:11 AM  Airway not difficult    General Information and Staff    Patient location during procedure: OR  Anesthesiologist: Johnny eBst MD  CRNA/CAA: Sayra Del Castillo CRNA    Indications and Patient Condition  Indications for airway management: airway protection    Preoxygenated: yes  MILS maintained throughout  Mask difficulty assessment: 1 - vent by mask    Final Airway Details  Final airway type: endotracheal airway      Successful airway: ETT  Cuffed: yes   Successful intubation technique: direct laryngoscopy  Facilitating devices/methods: intubating stylet  Endotracheal tube insertion site: oral  Blade: Dorcas  Blade size: 4  ETT size (mm): 7.5  Cormack-Lehane Classification: grade I - full view of glottis  Placement verified by: chest auscultation, capnometry and palpation of cuff   Measured from: lips  ETT/EBT  to lips (cm): 24  Number of attempts at approach: 1  Assessment: lips, teeth, and gum same as pre-op and atraumatic intubation

## 2023-08-23 ENCOUNTER — TELEPHONE (OUTPATIENT)
Dept: ORTHOPEDIC SURGERY | Facility: CLINIC | Age: 63
End: 2023-08-23
Payer: OTHER GOVERNMENT

## 2023-08-23 VITALS
DIASTOLIC BLOOD PRESSURE: 68 MMHG | RESPIRATION RATE: 16 BRPM | TEMPERATURE: 97.7 F | HEIGHT: 70 IN | OXYGEN SATURATION: 96 % | HEART RATE: 86 BPM | BODY MASS INDEX: 31.72 KG/M2 | SYSTOLIC BLOOD PRESSURE: 114 MMHG | WEIGHT: 221.56 LBS

## 2023-08-23 DIAGNOSIS — M16.11 PRIMARY OSTEOARTHRITIS OF RIGHT HIP: Primary | ICD-10-CM

## 2023-08-23 DIAGNOSIS — Z96.641 S/P HIP REPLACEMENT, RIGHT: ICD-10-CM

## 2023-08-23 LAB
ANION GAP SERPL CALCULATED.3IONS-SCNC: 10 MMOL/L (ref 5–15)
BUN SERPL-MCNC: 19 MG/DL (ref 8–23)
BUN/CREAT SERPL: 16.1 (ref 7–25)
CALCIUM SPEC-SCNC: 9.2 MG/DL (ref 8.6–10.5)
CHLORIDE SERPL-SCNC: 104 MMOL/L (ref 98–107)
CO2 SERPL-SCNC: 25 MMOL/L (ref 22–29)
CREAT SERPL-MCNC: 1.18 MG/DL (ref 0.76–1.27)
EGFRCR SERPLBLD CKD-EPI 2021: 70.2 ML/MIN/1.73
GLUCOSE SERPL-MCNC: 153 MG/DL (ref 65–99)
HCT VFR BLD AUTO: 31.8 % (ref 37.5–51)
HGB BLD-MCNC: 11.4 G/DL (ref 13–17.7)
POTASSIUM SERPL-SCNC: 4.4 MMOL/L (ref 3.5–5.2)
SODIUM SERPL-SCNC: 139 MMOL/L (ref 136–145)

## 2023-08-23 PROCEDURE — 97165 OT EVAL LOW COMPLEX 30 MIN: CPT

## 2023-08-23 PROCEDURE — 85014 HEMATOCRIT: CPT | Performed by: ORTHOPAEDIC SURGERY

## 2023-08-23 PROCEDURE — 85018 HEMOGLOBIN: CPT | Performed by: ORTHOPAEDIC SURGERY

## 2023-08-23 PROCEDURE — 80048 BASIC METABOLIC PNL TOTAL CA: CPT | Performed by: ORTHOPAEDIC SURGERY

## 2023-08-23 PROCEDURE — 97116 GAIT TRAINING THERAPY: CPT

## 2023-08-23 PROCEDURE — 99024 POSTOP FOLLOW-UP VISIT: CPT | Performed by: NURSE PRACTITIONER

## 2023-08-23 PROCEDURE — G0378 HOSPITAL OBSERVATION PER HR: HCPCS

## 2023-08-23 PROCEDURE — 25010000002 CEFAZOLIN PER 500 MG: Performed by: ORTHOPAEDIC SURGERY

## 2023-08-23 RX ORDER — GABAPENTIN 400 MG/1
400 CAPSULE ORAL 3 TIMES DAILY
Qty: 20 CAPSULE | Refills: 0 | Status: SHIPPED | OUTPATIENT
Start: 2023-08-23

## 2023-08-23 RX ADMIN — TAMSULOSIN HYDROCHLORIDE 0.4 MG: 0.4 CAPSULE ORAL at 08:23

## 2023-08-23 RX ADMIN — SENNOSIDES AND DOCUSATE SODIUM 2 TABLET: 50; 8.6 TABLET ORAL at 08:23

## 2023-08-23 RX ADMIN — FOLIC ACID 1 MG: 1 TABLET ORAL at 08:23

## 2023-08-23 RX ADMIN — APIXABAN 2.5 MG: 2.5 TABLET, FILM COATED ORAL at 08:23

## 2023-08-23 RX ADMIN — OXYCODONE HYDROCHLORIDE 7.5 MG: 5 TABLET ORAL at 03:06

## 2023-08-23 RX ADMIN — GABAPENTIN 400 MG: 400 CAPSULE ORAL at 08:23

## 2023-08-23 RX ADMIN — ACETAMINOPHEN 325 MG: 325 TABLET, FILM COATED ORAL at 04:06

## 2023-08-23 RX ADMIN — MELOXICAM 15 MG: 15 TABLET ORAL at 08:23

## 2023-08-23 RX ADMIN — OXYCODONE HYDROCHLORIDE 7.5 MG: 5 TABLET ORAL at 08:23

## 2023-08-23 RX ADMIN — CEFAZOLIN 2000 MG: 2 INJECTION, POWDER, FOR SOLUTION INTRAMUSCULAR; INTRAVENOUS at 03:01

## 2023-08-23 RX ADMIN — LEVOTHYROXINE SODIUM 75 MCG: 0.07 TABLET ORAL at 08:23

## 2023-08-23 NOTE — TELEPHONE ENCOUNTER
Caller: PATIENT    Relationship: SELF     Best call back number: [phone]    What orders are you requesting (i.e. lab or imaging): ORDER FOR A WALKER.    Additional notes: PATIENT WAS CALLING TO RE[facility] STATING YOUR OFFICE NEEDS TO SENT AN ORDER AND RSS FORM ASAP. PATIENT DID NOT HAVE THEIR FAX NUMBER BUT IS GOING TO CALL BACK WITH THE FAX NUMBER. PATIENT STATED HE WAS NOT SURE HOW THIS WORKS BUT STATED MS. PITMNMADIE SHOULD KNOW HOW TO TAKE CARE OF THIS. THANK YOU!

## 2023-08-23 NOTE — PLAN OF CARE
Goal Outcome Evaluation:  Plan of Care Reviewed With: patient        Progress: no change  Outcome Evaluation: t is a 63 y/o M admitted to [facility] on 8/22/23 for elective R hip aTHA performed by Dr. Mccullough. Pt had been diagnosed with severe OA of the R hip and had failed conservative management, electing for surgical intervention at this date. PMH includes HTN, CAD s/p CABG, and OA. Pt. states she is I/ADL independent at baseline, lives at home w/ his 15 y/o daughter. Pt. feels better this date, completes ambulatory transfers unassisted utilizing rolling walker support. Pt. provided setup assist for all LB dressing, donning pants/underwear. Pt. presents at baseline functional independence, does not require furthur skilled OT at this time. Recommend d/c home w/ family support/assist PRN.      Anticipated Discharge Disposition (OT): home

## 2023-08-23 NOTE — CASE MANAGEMENT/SOCIAL WORK
Case Management Discharge Note      [facility]         Selected Continued Care - Discharged on 8/23/2023 Admission date: 8/22/2023 - Discharge disposition: Home or Self Care          Home Medical Care Coordination complete.      Service Provider Selected Services Address Phone Fax Patient Preferred    [facility] [address], [place] [address] [phone] --                      Transportation Services  Private: Car    Final Discharge Disposition Code: 06 - home with home health care

## 2023-08-23 NOTE — THERAPY TREATMENT NOTE
"Subjective: Pt agreeable to therapeutic plan of care.    Objective:   WB'ing status: R Lower Extremity Weight Bearing As Tolerated    Bed mobility - Modified-Independent  Transfers - SBA  Ambulation - 100 feet SBA and with rolling walker  Stair 4 steps SBA    Vitals: WNL    Pain: 4 VAS   Location: R hip incision  Intervention for pain: Increased Activity    Education: Provided education on the importance of mobility in the acute care setting, Verbal/Tactile Cues, Gait Training, and Post-Op Precautions    Assessment: Duke Freire presents with functional mobility impairments which indicate the need for skilled intervention. Patient ambulated 100 ft with SBA and verbal cues to increase R knee flexion during swing phase. Patient able to safely ascend/descend 4 steps with reciprocal gait pattern. Reinforced importance of using rw x 1 week and use of ice to decrease swelling. Tolerating session today without incident. Will continue to follow and progress as tolerated.     Plan/Recommendations:   Low Intensity Therapy recommended post-acute care - This is recommended as therapy feels this patient would require 2-3 visits per week. OP or HH would be the best option depending on patient's home bound status. Consider, if the patient has other  \"skilled\" needs such as wounds, IV antibiotics, etc. Combined with \"low intensity\" could also equate to a SNF. If patient is medically complex, consider LTAC.. Pt requires no DME at discharge.     Pt desires Home with Home Health at discharge. Pt cooperative; agreeable to therapeutic recommendations and plan of care.         Basic Mobility 6-click:  Rollin = Total, A lot = 2, A little = 3; 4 = None  Supine>Sit:   1 = Total, A lot = 2, A little = 3; 4 = None   Sit>Stand with arms:  1 = Total, A lot = 2, A little = 3; 4 = None  Bed>Chair:   1 = Total, A lot = 2, A little = 3; 4 = None  Ambulate in room:  1 = Total, A lot = 2, A little = 3; 4 = None  3-5 Steps with " railin = Total, A lot = 2, A little = 3; 4 = None  Score: 24    Post-Tx Position: Up in Chair and Call light and personal items within reach  PPE: gloves and N95

## 2023-08-23 NOTE — CONSULTS
[facility] Medicine Services   Consult Note    Patient Name: Duke Freire  : 1960  MRN: [id number]  Primary Care Physician:  Provider, No Known  Referring Physician: Wilber Mccullough MD  Date of admission: 2023  Date and Time of Care: 2023 at 2050.    Consults    Subjective      Reason for Consult/ Chief Complaint: Medical management.    Consult Requested By: Dr. Wilber Mccullough.    History of Present Illness: Duke Freire is a 62 y.o. male with history of asthma, coronary artery disease status post CABG and 2 stents, hypertension, dyslipidemia and hypothyroidism has DJD which has been progressive especially in the right hip and finally patient went for total hip arthroplasty today.  Surgery went well and patient denies any complaints whatsoever.  He says he does not have any pain in the hip area and denies any chest pain shortness of breath cough fever chills abdominal pain nausea vomiting diarrhea constipation hematemesis hematochezia melena hemoptysis dysuria or hematuria.    Review of Systems   Constitutional: Negative for chills, fever and night sweats.   HENT:  Negative for congestion, hoarse voice and sore throat.    Eyes:  Negative for blurred vision and double vision.   Cardiovascular:  Negative for chest pain, irregular heartbeat, leg swelling, near-syncope, orthopnea, palpitations and syncope.   Respiratory:  Negative for cough, hemoptysis, shortness of breath, sputum production and wheezing.    Endocrine: Negative for cold intolerance and heat intolerance.   Skin:  Negative for itching and rash.   Musculoskeletal:  Negative for back pain, neck pain and stiffness.   Gastrointestinal:  Negative for abdominal pain, constipation, diarrhea, hematemesis, hematochezia, melena, nausea and vomiting.   Genitourinary:  Negative for dysuria, frequency, hematuria and urgency.   Neurological:  Negative for dizziness, focal weakness, headaches, light-headedness, tremors, vertigo and  weakness.   Psychiatric/Behavioral:  Negative for altered mental status, depression and hallucinations.         Personal History     Past Medical History:   Diagnosis Date    Anesthesia complication     pt does not believe he was completely sedated during CABG     he still feels incisional pain and has flashbacks of the cut    Asthma     pt is unsure of diagnosis    Coronary artery disease     Dyslipidemia     Essential hypertension     hx    Hyperlipidemia     Hyperthyroidism     Hypothyroidism     PONV (postoperative nausea and vomiting)     Primary osteoarthritis of right hip 07/05/2023       Past Surgical History:   Procedure Laterality Date    CARDIAC CATHETERIZATION Left 12/04/2019    Procedure: Left Heart Cath;  Surgeon: James Hernandez MD;  Location: [facility] CATH INVASIVE LOCATION;  Service: Cardiovascular    CARDIAC CATHETERIZATION N/A 12/04/2019    Procedure: Coronary angiography;  Surgeon: James Hernandez MD;  Location: [facility] CATH INVASIVE LOCATION;  Service: Cardiovascular    CARDIAC SURGERY      CORONARY ANGIOPLASTY WITH STENT PLACEMENT      KRISTEN: mid LAD & Ostial diagonal    CORONARY ARTERY BYPASS GRAFT  12/06/2019    2 Vessels: LIMA to distal LAD    CORONARY ARTERY BYPASS GRAFT N/A 12/06/2019    Procedure: CORONARY ARTERY BYPASS GRAFTING;  Surgeon: Laverne Boyle MD;  Location: [facility] CVOR;  Service: Cardiothoracic    INNER EAR SURGERY  1985       Family History: family history includes Diabetes in his mother and sister; No Known Problems in his brother, father, maternal aunt, maternal grandfather, maternal grandmother, maternal uncle, paternal aunt, paternal grandfather, paternal grandmother, paternal uncle, and another family member. Otherwise pertinent FHx was reviewed and not pertinent to current issue.    Social History:  reports that he has never smoked. He has never used smokeless tobacco. He reports that he does not drink alcohol and does not use drugs.    Home  Medications:   HYDROcodone-acetaminophen, acetaminophen, albuterol sulfate HFA, apixaban, atorvastatin, clopidogrel, folic acid, gabapentin, levothyroxine, lidocaine, midodrine, ondansetron, oxyCODONE-acetaminophen, sennosides-docusate, and tamsulosin    Allergies:  No Known Allergies      Objective      Vitals:  Temp:  [97.1 øF (36.2 øC)-99 øF (37.2 øC)] 98.1 øF (36.7 øC)  Heart Rate:  [76-93] 82  Resp:  [7-23] 15  BP: ()/(47-92) 144/87  Flow (L/min):  [3-6] 3    Physical Exam  Constitutional:       General: He is not in acute distress.  HENT:      Head: Normocephalic and atraumatic.      Mouth/Throat:      Mouth: Mucous membranes are moist.      Pharynx: Oropharynx is clear.   Eyes:      Extraocular Movements: Extraocular movements intact.   Cardiovascular:      Rate and Rhythm: Normal rate and regular rhythm.      Pulses: Normal pulses.      Heart sounds: Normal heart sounds.   Pulmonary:      Effort: Pulmonary effort is normal.      Breath sounds: Normal breath sounds.   Abdominal:      Palpations: Abdomen is soft.      Tenderness: There is no abdominal tenderness.   Musculoskeletal:      Right lower leg: No edema.      Left lower leg: No edema.   Skin:     General: Skin is warm and dry.   Neurological:      General: No focal deficit present.      Mental Status: He is alert.   Psychiatric:         Mood and Affect: Mood normal.         Behavior: Behavior normal.          Result Review    Result Review:  I have personally reviewed the results from the time of this admission to 8/22/2023 20:47 EDT and agree with these findings:  [x]  Laboratory  []  Microbiology  [x]  Radiology  []  EKG/Telemetry   []  Cardiology/Vascular   []  Pathology  []  Old records  []  Other:  Most notable findings include:   Labs from 8/16/2023 showed normal CBC and BMP.  UA was negative.  Chest x-ray with no acute process.  EKG with normal sinus rhythm and no significant change.    Assessment & Plan        Active Hospital  Problems:  Active Hospital Problems    Diagnosis     **Primary osteoarthritis of right hip      Plan:     #DJD status post right total hip arthroplasty:  Postop care as per surgery.  Pain control.    #CAD:  No chest pain or any signs and symptoms suggestive of acute coronary syndrome.  Continue heart medications but hold Plavix at this point.  Plavix will be started when orthopedic would allow that.    #Asthma:  Stable at this point.  Inhalers as needed.    #Hypothyroidism:  Continue Synthroid at home dose.    Patient is full code.    I have utilized all available immediate resources to obtain, update, or review the patient's current medications.       Signature: Electronically signed by Bart Boyle MD, 08/22/23, 20:47 EDT.  [facility] Hospitalist Team

## 2023-08-23 NOTE — DISCHARGE INSTRUCTIONS
Anterior Total Hip Replacement Discharge Instructions:    I. ACTIVITIES:    1. Exercises:    Complete exercise program as taught by the hospital physical therapist 2 times per day    Exercise program will be advanced by the physical therapist    During the day be up ambulating every 2 hours (while awake) for short distances    Complete the ankle pump exercises at least 10 times per hour (while awake)    Elevate legs when in bed and for at least 30 minutes during the day.Use cold packs 20-30 minutes approximately 5 times per day. This should be done before and after completing your exercises and at any time you are experiencing pain/ stiffness in your operative extremity.    2. Activities of Daily Living:    No tub baths, hot tubs, or swimming pools for 4 weeks    May shower with waterproof dressing in place, as long as it is nice and tight around the edges. Do not scrub or rub the incision. Let water run over dressing and pat dry.    II. Restrictions    Continue anterior hip precautions as taught at the hospital    Your surgeon will discuss with you when you will be able to drive again, typically when you are off pain medication and have enough strength in your leg to step hard on the brake.    Weight bearing is as tolerated    Do not lift more than 10 lbs for the first 2 weeks.    First week stay inside on even terrain. May go up and down stairs one stair at a time utilizing the hand rail once cleared by physical therapy to do so.    After one week, you may venture outside (if cleared to do so by physical therapist).    III. Precautions:    Everyone that comes near you should wash their hands    No elective dental, genital-urinary, or colon procedures or surgical procedures for 12 weeks after surgery unless absolutely necessary.    If dental work or surgical procedure is deemed absolutely necessary, you will need to contact your surgeon as you will need to take antibiotics 1 hour prior to any dental work (including  teeth cleanings).    Please discuss with your surgeon prophylactic antibiotics as the length of time this intervention will be necessary for you varies with each patient's health history and situation.    Avoid sick people. If you must be around someone who is ill, they should wear a mask.    Avoid visits to the Emergency Room or Urgent Care unless you are having a life threatening event.    If ordered stockings are to be placed on in the morning and removed at night. Monitor the stockings to ensure that any swelling is not causing the stockings to become too tight. In this case, remove stockings immediately.    IV. INCISION CARE:    Wash your hands often.    Notify office if dressing becomes dislodged or drainage noted. Change the dressing as directed by your physician.    No creams or ointments to the incision, unless instructed.    Do not touch or pick at the incision    Check dressing every day and notify surgeon immediately if any of the following signs or symptoms are noted:    Increase in redness    Increase in swelling around the incision and of the entire extremity    Increase in pain    Drainage oozing from the incision    Increase in overall body temperature (greater than 101 degrees)    Your surgeon will instruct you regarding suture or staple removal    V. Medications:    1. Anticoagulants: You will be discharged on an anticoagulant. This is a prophylactic medication that helps prevent blood clots during your post-operative period. The type and length of dosage varies based on your individual needs, procedure performed, and surgeon's preference.    While taking the anticoagulant, you should avoid taking any additional aspirin, ibuprofen (Advil or Motrin), Aleve (Naprosyn) or other non-steroidal anti-inflammatory medications, unless prescribed.    Notify surgeon immediately if any taz bleeding is noted in the urine, stool, emesis, or from the nose or the incision. Blood in the stool will often appear  as black rather than red. Blood in urine may appear as pink. Blood in emesis may appear as brown/black like coffee grounds.    You will need to apply pressure for longer periods of time to any cuts or abrasions to stop bleeding    Avoid alcohol while taking anticoagulants    2. Stool Softeners: You will be at greater risk of constipation after surgery due to being less mobile and the pain medications.    Take stool softeners as instructed by your surgeon while on pain medications. Over the counter Colace 100 mg 1-2 capsules twice daily.    If stools become too loose or too frequent, please decreases the dosage or stop the stool softener.    If constipation occurs despite use of stool softeners, you are to continue the stool softeners and add a laxative (Milk of Magnesia 1 ounce daily as needed)    Drink plenty of fluids, and eat fruits and vegetables during your recovery time    3. Pain Medications utilized after surgery are narcotics and the law requires that the following information be given to all patients that are prescribed narcotics:    CLASSIFICATION: Pain medications are called Opioids and are narcotics    LEGALITIES: It is illegal to share narcotics with others and to drive within 24 hours of taking narcotics    POTENTIAL SIDE EFFECTS: Potential side effects of opioids include: nausea, vomiting, itching, dizziness, drowsiness, dry mouth, constipation, and difficulty urinating.    POTENTIAL ADVERSE EFFECTS:    Opioid tolerance can develop with use of pain medications and this simply means that it requires more and more of the medication to control pain; however, this is seen more in patients that use opioids for longer periods of time.    Opioid dependence can develop with use of Opioids and this simply means that to stop the medication can cause withdrawal symptoms; however, this is seen with patients that use Opioids for longer periods of time.    Opioid addiction can develop with use of Opioids and the  incidence of this is very unlikely in patients who take the medications as ordered and stop the medications as instructed.    Opioid overdose can be dangerous, but is unlikely when the medication is taken as ordered and stopped when ordered. It is important not to mix opioids with alcohol or with and type of sedative such as Benadryl as this can lead to over sedation and respiratory difficulty.    DOSAGE:    Pain medications will need to be taken consistently for the first week to decrease pain and promote adequate pain relief and participation in physical therapy.    After the initial surgical pain begins to resolve, you may begin to decrease the pain medication. By the end of 6 weeks, you should be off of pain medications.    Refills will not be given by the office during evening hours, on weekends, or after 6 weeks post-op.    To seek refills on pain medications during the initial 6 week post-operative period, you must call the office 48 hours in advance to request the refill. The office will then notify you when to  the prescription. DO NOT wait until you are out of the medication to request a refill.    V. FOLLOW-UP VISITS:    You will need to follow up in the office with Dr. Wilber Mccullough/Mami CURTIS in 2 weeks. Please call this number [phone] to schedule this appointment.    You will need to follow up with your primary care physician in 4 weeks.    If you have any concerns or suspected complications prior to your follow up visit, please call your surgeons office. Do not wait until your appointment time if you suspect complications. These will need to be addressed in the office promptly.      You have a CHIKA wound dressing in place.  This was placed under sterile conditions in the operating room.  It remains in place until your follow-up appointment.  The drain will operate with slight suction for 7 days.  After 7 days, it will stop working automatically.  At that time, remove the  "batteries from the battery pack and then discard the battery pack in the trash.  You may cover the end of the tubing with plastic wrap and tape it to the dressing or cut the tubing off near the dressing and cover it with tape, assuring that the area continues to be waterproof.  Keep it this way until your follow-up appointment.  Showering is ok after surgery.  Pause the battery pack (push the large orange button) and unscrew the battery pack from the tubing.  Place plastic wrap or tape over the end of the tubing.  Shower, attempting to keep the dressing out of the stream of water.  When finished, gently pat the dressing and tubing dry, remove plastic wrap or tape from end of tubing, and reattach tubing to the battery pack.  Then press the large orange button.  You will feel the battery pack vibrate until it achieves suction.  Then it will flash green over the \"ok\" button and run as usual.    "

## 2023-08-23 NOTE — THERAPY EVALUATION
Patient Name: Duke Freire  : 1960    MRN: [id number]                              Today's Date: 2023       Admit Date: 2023    Visit Dx:     ICD-10-CM ICD-9-CM   1. S/P hip replacement, right  Z96.641 V43.64   2. Primary osteoarthritis of right hip  M16.11 715.15     Patient Active Problem List   Diagnosis    Abnormal nuclear stress test    Angina pectoris    Coronary artery disease involving native coronary artery of native heart with unstable angina pectoris    Essential hypertension    Coronary artery disease    S/P CABG x 2    Abnormal result of cardiovascular function study    Anxiety    Erectile dysfunction    Hyperlipidemia    Coronary heart disease    Colon cancer screening    Primary osteoarthritis of right hip     Past Medical History:   Diagnosis Date    Anesthesia complication     pt does not believe he was completely sedated during CABG     he still feels incisional pain and has flashbacks of the cut    Asthma     pt is unsure of diagnosis    Coronary artery disease     Dyslipidemia     Essential hypertension     hx    Hyperlipidemia     Hyperthyroidism     Hypothyroidism     PONV (postoperative nausea and vomiting)     Primary osteoarthritis of right hip 2023     Past Surgical History:   Procedure Laterality Date    CARDIAC CATHETERIZATION Left 2019    Procedure: Left Heart Cath;  Surgeon: James Hernandez MD;  Location: [facility] CATH INVASIVE LOCATION;  Service: Cardiovascular    CARDIAC CATHETERIZATION N/A 2019    Procedure: Coronary angiography;  Surgeon: James Hernandez MD;  Location: [facility] CATH INVASIVE LOCATION;  Service: Cardiovascular    CARDIAC SURGERY      CORONARY ANGIOPLASTY WITH STENT PLACEMENT      KRISTEN: mid LAD & Ostial diagonal    CORONARY ARTERY BYPASS GRAFT  2019    2 Vessels: LIMA to distal LAD    CORONARY ARTERY BYPASS GRAFT N/A 2019    Procedure: CORONARY ARTERY BYPASS GRAFTING;  Surgeon: Laverne Boyle,  MD;  Location: [facility] CVOR;  Service: Cardiothoracic    INNER EAR SURGERY  1985    TOTAL HIP ARTHROPLASTY Right 8/22/2023    Procedure: TOTAL HIP ARTHROPLASTY ANTERIOR WITH HANA TABLE;  Surgeon: Wilber Mccullough MD;  Location: [facility] MAIN OR;  Service: Orthopedics;  Laterality: Right;      General Information       Row Name 08/23/23 1546          OT Time and Intention    Document Type evaluation  -MP     Mode of Treatment occupational therapy  -MP       Row Name 08/23/23 1546          General Information    Patient Profile Reviewed yes  -MP     Prior Level of Function independent:;ADL's  -MP       Row Name 08/23/23 1546          Living Environment    People in Home child(janet), dependent  -MP       Row Name 08/23/23 1546          Cognition    Orientation Status (Cognition) oriented x 4  -MP       Row Name 08/23/23 1546          Safety Issues, Functional Mobility    Impairments Affecting Function (Mobility) balance;endurance/activity tolerance;pain;range of motion (ROM);strength  -MP               User Key  (r) = Recorded By, (t) = Taken By, (c) = Cosigned By      Initials Name Provider Type    MP Luis M Kinney OT Occupational Therapist                     Mobility/ADL's       Row Name 08/23/23 1547          Bed Mobility    Bed Mobility bed mobility (all) activities  -MP     All Activities, [place] (Bed Mobility) modified independence  -MP       Row Name 08/23/23 1547          Sit-Stand Transfer    Sit-Stand [place] (Transfers) modified independence  -MP       Row Name 08/23/23 1547          Functional Mobility    Functional Mobility- Ind. Level conditional independence  -MP       Row Name 08/23/23 1547          Activities of Daily Living    BADL Assessment/Intervention lower body dressing  -MP       Row Name 08/23/23 1547          Mobility    Extremity Weight-bearing Status right lower extremity  -MP     Right Lower Extremity (Weight-bearing Status) weight-bearing as tolerated (WBAT)  -MP       Row Name  08/23/23 1547          Lower Body Dressing Assessment/Training    [place] Level (Lower Body Dressing) lower body dressing skills;doff;don;pants/bottoms;set up  -MP               User Key  (r) = Recorded By, (t) = Taken By, (c) = Cosigned By      Initials Name Provider Type    Luis M Soria OT Occupational Therapist                   Obj/Interventions       Row Name 08/23/23 1548          Range of Motion Comprehensive    Comment, General Range of Motion BUE WFL  -MP       Row Name 08/23/23 1548          Strength Comprehensive (MMT)    Comment, General Manual Muscle Testing (MMT) Assessment BUE WFL  -MP       Row Name 08/23/23 1548          Balance    Balance Interventions sitting;standing;sit to stand;supported;static;dynamic  -MP               User Key  (r) = Recorded By, (t) = Taken By, (c) = Cosigned By      Initials Name Provider Type    Luis M Soria OT Occupational Therapist                   Goals/Plan    No documentation.                  Clinical Impression       Row Name 08/23/23 1550          Pain Assessment    Pretreatment Pain Rating 0/10 - no pain  -MP     Posttreatment Pain Rating 0/10 - no pain  -MP       Row Name 08/23/23 1550          Plan of Care Review    Plan of Care Reviewed With patient  -MP     Progress no change  -MP     Outcome Evaluation t is a 61 y/o M admitted to [facility] on 8/22/23 for elective R hip aTHA performed by Dr. Mccullough. Pt had been diagnosed with severe OA of the R hip and had failed conservative management, electing for surgical intervention at this date. PMH includes HTN, CAD s/p CABG, and OA. Pt. states she is I/ADL independent at baseline, lives at home w/ his 13 y/o daughter. Pt. feels better this date, completes ambulatory transfers unassisted utilizing rolling walker support. Pt. provided setup assist for all LB dressing, donning pants/underwear. Pt. presents at baseline functional independence, does not require furthur skilled OT at this time. Recommend  d/c home w/ family support/assist PRN.  -MP       Row Name 08/23/23 1550          Therapy Assessment/Plan (OT)    Criteria for Skilled Therapeutic Interventions Met (OT) no;no problems identified which require skilled intervention  -MP     Therapy Frequency (OT) evaluation only  -MP       Row Name 08/23/23 1550          Therapy Plan Review/Discharge Plan (OT)    Anticipated Discharge Disposition (OT) home  -MP       Row Name 08/23/23 1550          Vital Signs    Pre Patient Position Supine  -MP     Intra Patient Position Standing  -MP     Post Patient Position Sitting  -MP       Row Name 08/23/23 1550          Positioning and Restraints    Pre-Treatment Position in bed  -MP     Post Treatment Position chair  -MP     In Chair sitting;call light within reach;exit alarm on;encouraged to call for assist  -MP               User Key  (r) = Recorded By, (t) = Taken By, (c) = Cosigned By      Initials Name Provider Type    Luis M Soria OT Occupational Therapist                   Outcome Measures       Row Name 08/23/23 0720          How much help from another person do you currently need...    Turning from your back to your side while in flat bed without using bedrails? 3  -EH     Moving from lying on back to sitting on the side of a flat bed without bedrails? 3  -EH     Moving to and from a bed to a chair (including a wheelchair)? 3  -EH     Standing up from a chair using your arms (e.g., wheelchair, bedside chair)? 3  -EH     Climbing 3-5 steps with a railing? 3  -EH     To walk in hospital room? 3  -EH     AM-PAC 6 Clicks Score (PT) 18  -EH     Highest level of mobility 6 --> Walked 10 steps or more  -               User Key  (r) = Recorded By, (t) = Taken By, (c) = Cosigned By      Initials Name Provider Type    Aquilino Lee RN Registered Nurse                    Occupational Therapy Education       Title: PT OT SLP Therapies (Resolved)       Topic: Occupational Therapy (Resolved)       Point: ADL training  (Resolved)       Description:   Instruct learner(s) on proper safety adaptation and remediation techniques during self care or transfers.   Instruct in proper use of assistive devices.                  Learner Progress:  Not documented in this visit.              Point: Home exercise program (Resolved)       Description:   Instruct learner(s) on appropriate technique for monitoring, assisting and/or progressing therapeutic exercises/activities.                  Learner Progress:  Not documented in this visit.              Point: Precautions (Resolved)       Description:   Instruct learner(s) on prescribed precautions during self-care and functional transfers.                  Learner Progress:  Not documented in this visit.              Point: Body mechanics (Resolved)       Description:   Instruct learner(s) on proper positioning and spine alignment during self-care, functional mobility activities and/or exercises.                  Learner Progress:  Not documented in this visit.                                  OT Recommendation and Plan  Therapy Frequency (OT): evaluation only  Plan of Care Review  Plan of Care Reviewed With: patient  Progress: no change  Outcome Evaluation: t is a 63 y/o M admitted to [facility] on 8/22/23 for elective R hip aTHA performed by Dr. Mccullough. Pt had been diagnosed with severe OA of the R hip and had failed conservative management, electing for surgical intervention at this date. PMH includes HTN, CAD s/p CABG, and OA. Pt. states she is I/ADL independent at baseline, lives at home w/ his 15 y/o daughter. Pt. feels better this date, completes ambulatory transfers unassisted utilizing rolling walker support. Pt. provided setup assist for all LB dressing, donning pants/underwear. Pt. presents at baseline functional independence, does not require furthur skilled OT at this time. Recommend d/c home w/ family support/assist PRN.     Time Calculation:         Time Calculation- OT       Row Name  08/23/23 1555             Time Calculation- OT    OT Start Time 0952  -MP      OT Stop Time 1025  -MP      OT Time Calculation (min) 33 min  -MP      Total Timed Code Minutes- OT 0 minute(s)  -MP      OT Received On 08/23/23  -                User Key  (r) = Recorded By, (t) = Taken By, (c) = Cosigned By      Initials Name Provider Type    Luis M Soria OT Occupational Therapist                  Therapy Charges for Today       Code Description Service Date Service Provider Modifiers Qty    [id number]  OT EVAL LOW COMPLEXITY 4 8/23/2023 Luis M Kinney OT GO 1                 Luis M Kinney OT  8/23/2023

## 2023-08-23 NOTE — TELEPHONE ENCOUNTER
Patient is s/p right total hip arthroplasty.   He requires rolling walker for safe ambulation.   He has limited mobility that significantly impairs his ability to participate in ADLs. This functional mobility deficit can be sufficiently resolved by use of a walker.

## 2023-08-23 NOTE — DISCHARGE SUMMARY
Orthopedic Discharge Summary      Patient: Duke Freire      YOB: 1960    Medical Record Number: [id number]    Date of Admission: 8/22/2023  5:48 AM  Date of Discharge:         Primary osteoarthritis of right hip        Allergies: No Known Allergies    Current Medications:     Discharge Medications        Changes to Medications        Instructions Start Date   gabapentin 400 MG capsule  Commonly known as: NEURONTIN  What changed: Another medication with the same name was added. Make sure you understand how and when to take each.   400 mg, Oral, 3 Times Daily      gabapentin 400 MG capsule  Commonly known as: NEURONTIN  What changed: You were already taking a medication with the same name, and this prescription was added. Make sure you understand how and when to take each.   400 mg, Oral, 3 Times Daily      levothyroxine 75 MCG tablet  Commonly known as: SYNTHROID, LEVOTHROID  What changed: how much to take   75 mcg, Oral, Daily             Continue These Medications        Instructions Start Date   albuterol sulfate  (90 Base) MCG/ACT inhaler  Commonly known as: PROVENTIL HFA;VENTOLIN HFA;PROAIR HFA   1 puff, Inhalation, Daily PRN      apixaban 2.5 MG tablet tablet  Commonly known as: ELIQUIS   2.5 mg, Oral, 2 Times Daily      atorvastatin 40 MG tablet  Commonly known as: LIPITOR   40 mg, Oral, Nightly      clopidogrel 75 MG tablet  Commonly known as: PLAVIX   75 mg, Oral, Daily      folic acid 1 MG tablet  Commonly known as: FOLVITE   1 mg, Oral, Daily      midodrine 2.5 MG tablet  Commonly known as: PROAMATINE   2.5 mg, Oral, 3 Times Daily Before Meals      ondansetron 4 MG tablet  Commonly known as: Zofran   4 mg, Oral, Every 8 Hours PRN      oxyCODONE-acetaminophen 7.5-325 MG per tablet  Commonly known as: PERCOCET    1-2 Oral Q4H PRN severe pain      sennosides-docusate 8.6-50 MG per tablet  Commonly known as: PERICOLACE   2 tablets, Oral, 2 Times Daily      tamsulosin 0.4 MG capsule  24 hr capsule  Commonly known as: FLOMAX   1 capsule, Oral, Daily             Stop These Medications      acetaminophen 650 MG 8 hr tablet  Commonly known as: TYLENOL     HYDROcodone-acetaminophen 5-325 MG per tablet  Commonly known as: NORCO     lidocaine 5 %  Commonly known as: LIDODERM                  Past Medical History:   Diagnosis Date    Anesthesia complication     pt does not believe he was completely sedated during CABG     he still feels incisional pain and has flashbacks of the cut    Asthma     pt is unsure of diagnosis    Coronary artery disease     Dyslipidemia     Essential hypertension     hx    Hyperlipidemia     Hyperthyroidism     Hypothyroidism     PONV (postoperative nausea and vomiting)     Primary osteoarthritis of right hip 07/05/2023        Past Surgical History:   Procedure Laterality Date    CARDIAC CATHETERIZATION Left 12/04/2019    Procedure: Left Heart Cath;  Surgeon: James Hernandez MD;  Location: [facility] CATH INVASIVE LOCATION;  Service: Cardiovascular    CARDIAC CATHETERIZATION N/A 12/04/2019    Procedure: Coronary angiography;  Surgeon: James Hernandez MD;  Location: [facility] CATH INVASIVE LOCATION;  Service: Cardiovascular    CARDIAC SURGERY      CORONARY ANGIOPLASTY WITH STENT PLACEMENT      KRISTEN: mid LAD & Ostial diagonal    CORONARY ARTERY BYPASS GRAFT  12/06/2019    2 Vessels: LIMA to distal LAD    CORONARY ARTERY BYPASS GRAFT N/A 12/06/2019    Procedure: CORONARY ARTERY BYPASS GRAFTING;  Surgeon: Laverne Boyle MD;  Location: [facility] CVOR;  Service: Cardiothoracic    INNER EAR SURGERY  1985        Social History     Occupational History    Not on file   Tobacco Use    Smoking status: Never    Smokeless tobacco: Never   Vaping Use    Vaping Use: Never used   Substance and Sexual Activity    Alcohol use: Never    Drug use: Never    Sexual activity: Yes     Partners: Female      Social History     Social History Narrative    Not on file        Family  History   Problem Relation Age of Onset    Diabetes Mother     Diabetes Sister     No Known Problems Father     No Known Problems Brother     No Known Problems Maternal Aunt     No Known Problems Maternal Uncle     No Known Problems Paternal Aunt     No Known Problems Paternal Uncle     No Known Problems Maternal Grandmother     No Known Problems Maternal Grandfather     No Known Problems Paternal Grandmother     No Known Problems Paternal Grandfather     No Known Problems Other     Anemia Neg Hx     Arrhythmia Neg Hx     Asthma Neg Hx     Clotting disorder Neg Hx     Fainting Neg Hx     Heart attack Neg Hx     Heart disease Neg Hx     Heart failure Neg Hx     Hyperlipidemia Neg Hx     Hypertension Neg Hx              Hospital Course:  62 y.o. male admitted to [facility] to services of Dr. Mccullough on 8/22/2023 and underwent RIGHT ABDOULAYE  Antibiotic and VTE prophylaxis were per SCIP protocols. Post-operatively the patient transferred to the post-operative floor where the patient underwent mobilization therapy that included active as well as passive ROM exercises. Opioids were titrated to achieve appropriate pain management to allow for participation in mobilization exercises. Vital signs are now stable. The incision is intact without signs or symptoms of infection. Operative extremity neurovascular status remains intact.   Appropriate education re: incision care, activity levels, medications, and follow up visits was completed and all questions were answered. The patient is now deemed stable for discharge from hospital.        Discharge and Follow up Instructions:   Please see AVS for complete post op patient education   Patient will follow up with Dr. Mccullough/Mami CURTIS in 1-2 weeks  Oxycodone 10 mg po Q4 hr PRN pain  Anticoagulation to continue for 4 weeks  Zofran 4 mg 1 tab PO Q8 hr PRN for N/V  Senna-S, 2 tabs BID e scribed for post op/opioid induced constipation     Date:  ................8/23/2023    KIRSTEN Veronica MD

## 2023-08-23 NOTE — PLAN OF CARE
Goal Outcome Evaluation:  Plan of Care Reviewed With: patient      Assessment: Duke Freire presents with functional mobility impairments which indicate the need for skilled intervention. Patient ambulated 100 ft with SBA and verbal cues to increase R knee flexion during swing phase. Patient able to safely ascend/descend 4 steps with reciprocal gait pattern. Reinforced importance of using rw x 1 week and use of ice to decrease swelling. Tolerating session today without incident. Will continue to follow and progress as tolerated.

## 2023-08-23 NOTE — PROGRESS NOTES
[facility] Medicine Services   Daily Progress Note    Patient Name: Duke Freire  : 1960  MRN: [id number]  Primary Care Physician:  Provider, No Known  Date of admission: 2023        Subjective      Patient seen and examined.  No acute events overnight.  Pain is well controlled.  Working with PT seen by orthopedic surgery this morning and cleared for discharge      Patient is hemodynamically stable at the time of my exam    14 point review of systems was performed and was negative except as noted in the HPI      Objective      Vitals:   Temp:  [97.1 øF (36.2 øC)-99 øF (37.2 øC)] 97.7 øF (36.5 øC)  Heart Rate:  [76-93] 86  Resp:  [7-22] 16  BP: ()/(47-87) 114/68  Flow (L/min):  [3-6] 3      Physical Exam      Physical Exam  Constitutional:       General: He is not in acute distress.  HENT:      Head: Normocephalic and atraumatic.      Mouth/Throat:      Mouth: Mucous membranes are moist.      Pharynx: Oropharynx is clear.   Eyes:      Extraocular Movements: Extraocular movements intact.   Cardiovascular:      Rate and Rhythm: Normal rate and regular rhythm.      Pulses: Normal pulses.      Heart sounds: Normal heart sounds.   Pulmonary:      Effort: Pulmonary effort is normal.      Breath sounds: Normal breath sounds.   Abdominal:      Palpations: Abdomen is soft.      Tenderness: There is no abdominal tenderness.   Musculoskeletal:      Right lower leg: No edema.      Left lower leg: No edema.   Skin:     General: Skin is warm and dry.   Neurological:      General: No focal deficit present.      Mental Status: He is alert.   Psychiatric:         Mood and Affect: Mood normal.         Behavior: Behavior normal.     Result Review    Result Review:  I have personally reviewed the results from the time of this admission to 2023 09:49 EDT and agree with these findings:  [x]  Laboratory  [x]  Microbiology  [x]  Radiology  [x]  EKG/Telemetry   [x]  Cardiology/Vascular   [x]   Pathology  [x]  Old records      Wounds (last 24 hours)       LDA Wound       Row Name 08/23/23 0720 08/22/23 2020 08/22/23 1810       Wound 08/22/23 0826 Right anterior hip Incision    Wound - Properties Group Placement Date: 08/22/23  - Placement Time: 0826 - Side: Right  -MC Orientation: anterior  -MC Location: hip  -MC Primary Wound Type: Incision  -MC    Dressing Appearance dry;intact  -EH dry;intact  -LB dry;intact  -EH    Closure UMM  -EH UMM  -LB UMM  -EH    Retired Wound - Properties Group Placement Date: 08/22/23  - Placement Time: 0826  - Side: Right  -MC Orientation: anterior  -MC Location: hip  -MC Primary Wound Type: Incision  -MC    Retired Wound - Properties Group Date first assessed: 08/22/23  - Time first assessed: 0826  -MC Side: Right  -MC Location: hip  -MC Primary Wound Type: Incision  -MC      Row Name 08/22/23 1636 08/22/23 1310 08/22/23 1205       Wound 08/22/23 0826 Right anterior hip Incision    Wound - Properties Group Placement Date: 08/22/23  - Placement Time: 0826 - Side: Right  -MC Orientation: anterior  -MC Location: hip  -MC Primary Wound Type: Incision  -MC    Dressing Appearance dry;intact  -TM dry;intact;no drainage  -LP dry;intact;no drainage  -PH    Closure UMM  -TM --  chichi  -LP UMM  -PH    Retired Wound - Properties Group Placement Date: 08/22/23  - Placement Time: 0826 -MC Side: Right  -MC Orientation: anterior  -MC Location: hip  -MC Primary Wound Type: Incision  -MC    Retired Wound - Properties Group Date first assessed: 08/22/23  - Time first assessed: 0826  -MC Side: Right  -MC Location: hip  -MC Primary Wound Type: Incision  -MC      Row Name 08/22/23 1135 08/22/23 1120 08/22/23 1105       Wound 08/22/23 0826 Right anterior hip Incision    Wound - Properties Group Placement Date: 08/22/23  - Placement Time: 0826 -MC Side: Right  -MC Orientation: anterior  -MC Location: hip  -MC Primary Wound Type: Incision  -MC    Dressing Appearance  dry;intact;no drainage  -PH dry;intact;no drainage  -PH dry;intact;no drainage  -PH    Closure UMM  -PH UMM  -PH UMM  -PH    Retired Wound - Properties Group Placement Date: 08/22/23  - Placement Time: 0826 - Side: Right  -MC Orientation: anterior  -MC Location: hip  -MC Primary Wound Type: Incision  -MC    Retired Wound - Properties Group Date first assessed: 08/22/23  - Time first assessed: 0826 - Side: Right  -MC Location: hip  -MC Primary Wound Type: Incision  -MC      Row Name 08/22/23 1050 08/22/23 1035 08/22/23 1020       Wound 08/22/23 0826 Right anterior hip Incision    Wound - Properties Group Placement Date: 08/22/23  - Placement Time: 0826 - Side: Right  - Orientation: anterior  -MC Location: hip  -MC Primary Wound Type: Incision  -MC    Dressing Appearance dry;intact;no drainage  -PH dry;intact;no drainage  -PH intact;dry;no drainage  -PH    Closure UMM  -PH UMM  -PH UMM  -PH    Retired Wound - Properties Group Placement Date: 08/22/23  - Placement Time: 0826 - Side: Right  -MC Orientation: anterior  -MC Location: hip  -MC Primary Wound Type: Incision  -MC    Retired Wound - Properties Group Date first assessed: 08/22/23  - Time first assessed: 0826 - Side: Right  -MC Location: hip  -MC Primary Wound Type: Incision  -MC      Row Name 08/22/23 1005             Wound 08/22/23 0826 Right anterior hip Incision    Wound - Properties Group Placement Date: 08/22/23  - Placement Time: 0826 - Side: Right  - Orientation: anterior  -MC Location: hip  -MC Primary Wound Type: Incision  -MC    Dressing Appearance dry;intact;no drainage  -PH      Closure UMM  -PH      Retired Wound - Properties Group Placement Date: 08/22/23  - Placement Time: 0826 - Side: Right  -MC Orientation: anterior  -MC Location: hip  -MC Primary Wound Type: Incision  -MC    Retired Wound - Properties Group Date first assessed: 08/22/23  - Time first assessed: 0826 - Side: Right  -MC Location: hip   - Primary Wound Type: Incision  -              User Key  (r) = Recorded By, (t) = Taken By, (c) = Cosigned By      Initials Name Provider Type    PH Peggy Grajeda, RN Registered Nurse    Jessica Pemberton, RN Registered Nurse    Agustin Holt, RN Registered Nurse    Soumya Young RN Registered Nurse    Hans Vences, RN Registered Nurse    Aquilino Lee, RN Registered Nurse                      Assessment & Plan            apixaban, 2.5 mg, Oral, Q12H  atorvastatin, 40 mg, Oral, Nightly  folic acid, 1 mg, Oral, Daily  gabapentin, 400 mg, Oral, TID  levothyroxine, 75 mcg, Oral, Daily  meloxicam, 15 mg, Oral, Daily  sennosides-docusate, 2 tablet, Oral, BID  tamsulosin, 0.4 mg, Oral, Daily       lactated ringers, 1,000 mL, Last Rate: 25 mL/hr at 08/22/23 0753  sodium chloride, 75 mL/hr, Last Rate: 75 mL/hr (08/22/23 1825)         Active Hospital Problems:  Active Hospital Problems    Diagnosis     **Primary osteoarthritis of right hip        #DJD status post right total hip arthroplasty:  Postop care as per orthopedic surgery  Pain control as ordered  Physical therapy    #CAD:  No chest pain or any signs and symptoms suggestive of acute coronary syndrome.  Continue heart medications but hold Plavix at this point.  Plavix will be started when cleared by orthopedic surgery     #Asthma:  Stable at this point.  Inhalers as needed.     #Hypothyroidism:  Continue Synthroid at home dose.     Patient is full code.    Anticipate discharge later today once cleared by PT and orthopedic surgery      DVT prophylaxis:  Medical DVT prophylaxis orders are present.    CODE STATUS: Full code       Disposition:  I expect patient to be discharged later today         Electronically signed by Drew Cosme MD, 08/23/23, 08:49 EDT.  [facility] Hospitalist Team

## 2023-08-23 NOTE — PLAN OF CARE
Goal Outcome Evaluation:         Has done well tonight. Remains numb from the block, so no pain yet. Has rested in bed with no issues. Care continuing.

## 2023-08-24 LAB — QT INTERVAL: 429 MS

## 2023-08-24 NOTE — TELEPHONE ENCOUNTER
I called angy and he said he's getting the walker and he dosen't need us to faax anything anymore. He also expressed concern about not making his appt next week due to transportation so  I transfer him to the  to change that appt time to make it more convince for him.

## 2023-09-01 ENCOUNTER — TELEPHONE (OUTPATIENT)
Dept: ORTHOPEDIC SURGERY | Facility: CLINIC | Age: 63
End: 2023-09-01
Payer: OTHER GOVERNMENT

## 2023-09-01 DIAGNOSIS — M16.11 PRIMARY OSTEOARTHRITIS OF RIGHT HIP: ICD-10-CM

## 2023-09-01 RX ORDER — OXYCODONE AND ACETAMINOPHEN 7.5; 325 MG/1; MG/1
TABLET ORAL
Qty: 45 TABLET | Refills: 0 | Status: SHIPPED | OUTPATIENT
Start: 2023-09-01

## 2023-09-01 NOTE — TELEPHONE ENCOUNTER
Patient called to request pain medication refill.  He needs medication sent into [facility] today by 2pm.

## 2023-09-05 ENCOUNTER — OFFICE VISIT (OUTPATIENT)
Dept: ORTHOPEDIC SURGERY | Facility: CLINIC | Age: 63
End: 2023-09-05
Payer: OTHER GOVERNMENT

## 2023-09-05 ENCOUNTER — TELEPHONE (OUTPATIENT)
Dept: ORTHOPEDIC SURGERY | Facility: CLINIC | Age: 63
End: 2023-09-05

## 2023-09-05 VITALS — BODY MASS INDEX: 31.64 KG/M2 | RESPIRATION RATE: 20 BRPM | WEIGHT: 221 LBS | HEIGHT: 70 IN

## 2023-09-05 DIAGNOSIS — Z96.641 S/P HIP REPLACEMENT, RIGHT: Primary | ICD-10-CM

## 2023-09-05 DIAGNOSIS — M16.11 PRIMARY OSTEOARTHRITIS OF RIGHT HIP: ICD-10-CM

## 2023-09-05 PROCEDURE — 99024 POSTOP FOLLOW-UP VISIT: CPT | Performed by: NURSE PRACTITIONER

## 2023-09-05 RX ORDER — OXYCODONE AND ACETAMINOPHEN 7.5; 325 MG/1; MG/1
TABLET ORAL
Qty: 45 TABLET | Refills: 0 | Status: SHIPPED | OUTPATIENT
Start: 2023-09-05

## 2023-09-05 NOTE — TELEPHONE ENCOUNTER
[facility].     Percocet e scribed on Friday, 9/1/23 was sent to incorrect patient and Mr. Freire did not receive his meds. Requesting new prescription to resent. Script sent.     Per pharmacy, attempting to remedy YO report given error in medical delivery, but did verify he DID NOT receive the Percocet I sent 4 days ago.

## 2023-09-05 NOTE — PROGRESS NOTES
[facility]  Post Operative Visit      Patient Name: Duke Freire  : 1960  Primary Care Physician: Provider, No Known        Chief Complaint:  S/P RIGHT total hip arthroplasty with Dr. Mccullough on 23.    HPI:   Duke Freire is a 62 y.o. who presents today for postoperative evaluation.     Recovering well overall. Unfortunately, his post op pain meds were delivered to the wrong person from [facility], so he has been without pain meds x 4 days. His pain is currently a 10/10. When he had his meds, it was well controlled.     He is taking his blood thinner as prescribed. Having bowel movements. No cough, chest pain, SOA. No fevers.     Participating in  PT; currently ambulating with a walker.       Past Medical/Surgical, Social and Family History:  I have reviewed and/or updated pertinent history as noted in the medical record including:  Past Medical History:   Diagnosis Date    Anesthesia complication     pt does not believe he was completely sedated during CABG     he still feels incisional pain and has flashbacks of the cut    Asthma     pt is unsure of diagnosis    Coronary artery disease     Dyslipidemia     Essential hypertension     hx    Hyperlipidemia     Hyperthyroidism     Hypothyroidism     PONV (postoperative nausea and vomiting)     Primary osteoarthritis of right hip 2023     Past Surgical History:   Procedure Laterality Date    CARDIAC CATHETERIZATION Left 2019    Procedure: Left Heart Cath;  Surgeon: James Hernandez MD;  Location: [facility] CATH INVASIVE LOCATION;  Service: Cardiovascular    CARDIAC CATHETERIZATION N/A 2019    Procedure: Coronary angiography;  Surgeon: James Hernandez MD;  Location: [facility] CATH INVASIVE LOCATION;  Service: Cardiovascular    CARDIAC SURGERY      CORONARY ANGIOPLASTY WITH STENT PLACEMENT      KRISTEN: mid LAD & Ostial diagonal    CORONARY ARTERY BYPASS GRAFT  2019    2 Vessels: LIMA to distal LAD    CORONARY  ARTERY BYPASS GRAFT N/A 12/06/2019    Procedure: CORONARY ARTERY BYPASS GRAFTING;  Surgeon: Laverne Boyle MD;  Location: [facility] CVOR;  Service: Cardiothoracic    INNER EAR SURGERY  1985    TOTAL HIP ARTHROPLASTY Right 8/22/2023    Procedure: TOTAL HIP ARTHROPLASTY ANTERIOR WITH HANA TABLE;  Surgeon: Wilber Mccullough MD;  Location: [facility] MAIN OR;  Service: Orthopedics;  Laterality: Right;     Social History     Occupational History    Not on file   Tobacco Use    Smoking status: Never    Smokeless tobacco: Never   Vaping Use    Vaping Use: Never used   Substance and Sexual Activity    Alcohol use: Never    Drug use: Never    Sexual activity: Yes     Partners: Female      Social History     Social History Narrative    Not on file     Family History   Problem Relation Age of Onset    Diabetes Mother     Diabetes Sister     No Known Problems Father     No Known Problems Brother     No Known Problems Maternal Aunt     No Known Problems Maternal Uncle     No Known Problems Paternal Aunt     No Known Problems Paternal Uncle     No Known Problems Maternal Grandmother     No Known Problems Maternal Grandfather     No Known Problems Paternal Grandmother     No Known Problems Paternal Grandfather     No Known Problems Other     Anemia Neg Hx     Arrhythmia Neg Hx     Asthma Neg Hx     Clotting disorder Neg Hx     Fainting Neg Hx     Heart attack Neg Hx     Heart disease Neg Hx     Heart failure Neg Hx     Hyperlipidemia Neg Hx     Hypertension Neg Hx        Allergies: No Known Allergies    Medications:   Home Medications:  Current Outpatient Medications on File Prior to Visit   Medication Sig    albuterol sulfate  (90 Base) MCG/ACT inhaler Inhale 1 puff Daily As Needed.    apixaban (ELIQUIS) 2.5 MG tablet tablet Take 1 tablet by mouth 2 (Two) Times a Day for 30 days.    atorvastatin (LIPITOR) 40 MG tablet Take 1 tablet by mouth Every Night.    clopidogrel (PLAVIX) 75 MG tablet Take 1 tablet by mouth Daily.    folic  acid (FOLVITE) 1 MG tablet Take 1 tablet by mouth Daily.    gabapentin (NEURONTIN) 400 MG capsule Take 1 capsule by mouth 3 (Three) Times a Day.    gabapentin (NEURONTIN) 400 MG capsule Take 1 capsule by mouth 3 (Three) Times a Day.    levothyroxine (SYNTHROID, LEVOTHROID) 75 MCG tablet Take 1 tablet by mouth Daily.    midodrine (PROAMATINE) 2.5 MG tablet Take 1 tablet by mouth 3 (Three) Times a Day Before Meals.    ondansetron (Zofran) 4 MG tablet Take 1 tablet by mouth Every 8 (Eight) Hours As Needed for Nausea or Vomiting.    sennosides-docusate (senna-docusate sodium) 8.6-50 MG per tablet Take 2 tablets by mouth 2 (Two) Times a Day for 30 days.    tamsulosin (FLOMAX) 0.4 MG capsule 24 hr capsule Take 1 capsule by mouth Daily.    [DISCONTINUED] oxyCODONE-acetaminophen (PERCOCET) 7.5-325 MG per tablet  1-2 Oral Q4H PRN severe pain     No current facility-administered medications on file prior to visit.         ROS:  14 point review of systems was negative except as listed in the HPI     Physical Exam:   62 y.o. male  Body mass index is 31.71 kg/m²., 100 kg (221 lb)  Vitals:    09/05/23 1503   Resp: 20         General: Alert, cooperative, appears well and in no observable distress.   HEENT: Normocephalic, atraumatic on external visual inspection. No icterus.   CV: No significant peripheral edema.   Respiratory: Normal respiratory effort.   Skin: Warm & well perfused; appropriate skin turgor.  Psych: Appropriate mood & affect.  Neuro: Gross sensation and motor intact in affected extremity/extremities.  Vascular: Peripheral pulses palpable in affected extremity/extremities. Calves/compartments soft and nontender, no evidence of DVT or compartment syndrome.    Ortho Exam      Right hip  CHIKA removed  Surgical wound glued with good approximation  Mild edema as expected  No bruising or bleeding noted.  Normal sensation    Investigations:  Post op hip imaging reviewed with patient  Good anatomical alignment               Assessment:  Primary OA of the right hip  S/p right ABDOULAYE  POD 14    Plan:  Continue physical therapy and ADLs as tolerated.  Educated on fall precautions.  Continue apixaban for a total of 4 weeks and then transition to full dose aspirin 325 mg p.o. daily x 2 weeks  Postoperative pain. May continue Tylenol as needed.  [facility] Patient to  today.   Postoperative constipation.  Well-controlled with over-the-counter stool softeners.  Educated on high-fiber diet with water intake.  Patient may shower.  Educated on warm soapy water running over the incision.  Do not apply any lotions or ointments to the incision.  Keep the incision covered with a clean, dry dressing daily until healing over with skin.  Continue to hold supplements and multivitamins while taking apixaban.  Educated on dental/ procedures and preprocedural antibiotic requirement/recommendation  Continue elevation, ice and compression  Follow-up in 4 weeks  Patient encouraged to call with questions or concerns in the interim       KIRSTEN Veronica

## 2023-09-05 NOTE — TELEPHONE ENCOUNTER
Hub staff attempted to follow warm transfer process and was unsuccessful     Caller: JED ARMENDARIZ/ [facility]    Relationship to patient:     Best call back number: [phone]    Patient is needing: JED ARMENDARIZ/ [facility] IS ASKING TO SPEAK WITH SOMEONE REGARDING AN RX THAT WAS SENT FOR PERCOCET. HE STATES IT WASN'T DELIVERED PROPERLY AND NEEDS A NEW RX SENT FOR PATIENT.

## 2023-10-03 ENCOUNTER — OFFICE VISIT (OUTPATIENT)
Dept: ORTHOPEDIC SURGERY | Facility: CLINIC | Age: 63
End: 2023-10-03
Payer: OTHER GOVERNMENT

## 2023-10-03 VITALS — BODY MASS INDEX: 31.64 KG/M2 | WEIGHT: 221 LBS | OXYGEN SATURATION: 97 % | RESPIRATION RATE: 20 BRPM | HEIGHT: 70 IN

## 2023-10-03 DIAGNOSIS — M16.11 PRIMARY OSTEOARTHRITIS OF RIGHT HIP: Primary | ICD-10-CM

## 2023-10-03 DIAGNOSIS — Z96.641 S/P HIP REPLACEMENT, RIGHT: ICD-10-CM

## 2023-10-03 PROCEDURE — 99024 POSTOP FOLLOW-UP VISIT: CPT | Performed by: NURSE PRACTITIONER

## 2023-10-03 NOTE — PROGRESS NOTES
[facility]  Post Operative Visit      Patient Name: Duke Freire  : 1960  Primary Care Physician: Provider, No Known        Chief Complaint:  S/P right total hip arthroplasty with Dr. Mccullough on 23.    HPI:   Duke Freire is a 62 y.o. who presents today for postoperative evaluation.     He is doing excellent today. He is ambulating without assistance. He has completed PT. He denies any hip. He denies any new chest pain/cough. Afebrile at home. Occasional fatigue and SOA with exertion. Compared to pre op, he feels like he has his life back and is planning activities with his 14 year old daughter.       Past Medical/Surgical, Social and Family History:  I have reviewed and/or updated pertinent history as noted in the medical record including:  Past Medical History:   Diagnosis Date    Anesthesia complication     pt does not believe he was completely sedated during CABG     he still feels incisional pain and has flashbacks of the cut    Asthma     pt is unsure of diagnosis    Coronary artery disease     Dyslipidemia     Essential hypertension     hx    Hyperlipidemia     Hyperthyroidism     Hypothyroidism     PONV (postoperative nausea and vomiting)     Primary osteoarthritis of right hip 2023     Past Surgical History:   Procedure Laterality Date    CARDIAC CATHETERIZATION Left 2019    Procedure: Left Heart Cath;  Surgeon: James Hernandez MD;  Location:  [facility] CATH INVASIVE LOCATION;  Service: Cardiovascular    CARDIAC CATHETERIZATION N/A 2019    Procedure: Coronary angiography;  Surgeon: James Hernandez MD;  Location:  [facility] CATH INVASIVE LOCATION;  Service: Cardiovascular    CARDIAC SURGERY      CORONARY ANGIOPLASTY WITH STENT PLACEMENT      KRISTEN: mid LAD & Ostial diagonal    CORONARY ARTERY BYPASS GRAFT  2019    2 Vessels: LIMA to distal LAD    CORONARY ARTERY BYPASS GRAFT N/A 2019    Procedure: CORONARY ARTERY BYPASS GRAFTING;   Surgeon: Laverne Boyle MD;  [facility];  Service: Cardiothoracic    HIP SURGERY  9/22/2023    INNER EAR SURGERY  1985    TOTAL HIP ARTHROPLASTY Right 08/22/2023    Procedure: TOTAL HIP ARTHROPLASTY ANTERIOR WITH HANA TABLE;  Surgeon: Wilber Mccullough MD;  Location: [facility] MAIN OR;  Service: Orthopedics;  Laterality: Right;     Social History     Occupational History    Not on file   Tobacco Use    Smoking status: Never    Smokeless tobacco: Never   Vaping Use    Vaping Use: Never used   Substance and Sexual Activity    Alcohol use: Never    Drug use: Never    Sexual activity: Not Currently     Partners: Female     Birth control/protection: None      Social History     Social History Narrative    Not on file     Family History   Problem Relation Age of Onset    Diabetes Mother     Diabetes Sister     No Known Problems Father     No Known Problems Brother     No Known Problems Maternal Aunt     No Known Problems Maternal Uncle     No Known Problems Paternal Aunt     No Known Problems Paternal Uncle     No Known Problems Maternal Grandmother     No Known Problems Maternal Grandfather     No Known Problems Paternal Grandmother     No Known Problems Paternal Grandfather     No Known Problems Other     Anemia Neg Hx     Arrhythmia Neg Hx     Asthma Neg Hx     Clotting disorder Neg Hx     Fainting Neg Hx     Heart attack Neg Hx     Heart disease Neg Hx     Heart failure Neg Hx     Hyperlipidemia Neg Hx     Hypertension Neg Hx        Allergies: No Known Allergies    Medications:   Home Medications:  Current Outpatient Medications on File Prior to Visit   Medication Sig    amoxicillin-clavulanate (Augmentin) 875-125 MG per tablet Take 1 tablet by mouth Every 12 (Twelve) Hours.    apixaban (ELIQUIS) 2.5 MG tablet tablet Take 1 tablet by mouth 2 (Two) Times a Day.    atorvastatin (LIPITOR) 40 MG tablet Take 1 tablet by mouth Every Night.    fluticasone (FLONASE) 50 MCG/ACT nasal spray 2 sprays into the nostril(s)  as directed by provider Daily.    folic acid (FOLVITE) 1 MG tablet Take 1 tablet by mouth Daily.    gabapentin (NEURONTIN) 400 MG capsule Take 1 capsule by mouth 3 (Three) Times a Day.    levothyroxine (SYNTHROID, LEVOTHROID) 75 MCG tablet Take 1 tablet by mouth Daily.    midodrine (PROAMATINE) 2.5 MG tablet Take 1 tablet by mouth 3 (Three) Times a Day Before Meals.    ondansetron (Zofran) 4 MG tablet Take 1 tablet by mouth Every 8 (Eight) Hours As Needed for Nausea or Vomiting.    ondansetron ODT (ZOFRAN-ODT) 4 MG disintegrating tablet Place 1 tablet on the tongue Every 8 (Eight) Hours As Needed for Nausea.    oxyCODONE-acetaminophen (PERCOCET) 7.5-325 MG per tablet  1-2 Oral Q4H PRN severe pain    tamsulosin (FLOMAX) 0.4 MG capsule 24 hr capsule Take 1 capsule by mouth Daily.     No current facility-administered medications on file prior to visit.         ROS:  14 point review of systems was negative except as listed in the HPI     Physical Exam:   62 y.o. male  Body mass index is 31.71 kg/m²., 100 kg (221 lb)  Vitals:    10/03/23 1401   Resp: 20   SpO2: 97%         General: Alert, cooperative, appears well and in no observable distress.   HEENT: Normocephalic, atraumatic on external visual inspection. No icterus.   CV: No significant peripheral edema.   Respiratory: Normal respiratory effort.   Skin: Warm & well perfused; appropriate skin turgor.  Psych: Appropriate mood & affect.  Neuro: Gross sensation and motor intact in affected extremity/extremities.  Vascular: Peripheral pulses palpable in affected extremity/extremities. Calves/compartments soft and nontender, no evidence of DVT or compartment syndrome.    Ortho Exam      Right hip  Incision healed  No edema, no s/s infection  Normal sensation  Normal ROM in the hip    Investigations:  No new x-rays were needed today.              Assessment:  S/p right total hip arthroplasty  Body mass index is 31.71 kg/m².  BMI consistent with Obese Class I:  30-34.9kg/m2        Plan:  Continue physical therapy and ADLs as tolerated.  Educated on fall precautions.  Educated on dental/ procedures and preprocedural antibiotic requirement/recommendation  Continue elevation, ice and compression PRN  BMI reviewed  Follow-up PRN  Patient encouraged to call with questions or concerns in the interim       KIRSTEN Veronica

## 2024-06-10 NOTE — TELEPHONE ENCOUNTER
Called pt to notify him of insurance coverage for rehab program. Since OOP and deduct rolled over Jan 1, pt wants to do phase 3 program instead. Informed him I will fax order to Dr. Leal to sign, then call pt back to schedule.   
aspiration precautions/fall precautions

## 2025-01-10 NOTE — ANESTHESIA POSTPROCEDURE EVALUATION
Patient: Duke Freire    Procedure Summary     Date:  12/06/19 Room / Location:   [facility] /  [facility]    Anesthesia Start:  1340 Anesthesia Stop:  2040    Procedure:  CORONARY ARTERY BYPASS GRAFTING (N/A Chest) Diagnosis:       Coronary artery disease involving native coronary artery of native heart with unstable angina pectoris (CMS/HCC)      (Coronary artery disease involving native coronary artery of native heart with unstable angina pectoris (CMS/HCC) [I25.110])    Surgeon:  Laverne Boyle MD Provider:  Hitesh Greenwood MD    Anesthesia Type:  general ASA Status:  4          Anesthesia Type: general  Last vitals  BP   106/73 (12/06/19 1240)   Temp   98.1 °F (36.7 °C) (12/06/19 0800)   Pulse   69 (12/06/19 1240)   Resp   16 (12/06/19 0050)     SpO2   97 % (12/06/19 1240)     Post Anesthesia Care and Evaluation    Patient location during evaluation: ICU  Patient participation: complete - patient cannot participate  Level of consciousness: obtunded/minimal responses  Pain score: 0  Pain management: adequate  Airway patency: patent  Anesthetic complications: No anesthetic complications  PONV Status: none  Cardiovascular status: acceptable  Respiratory status: acceptable  Hydration status: acceptable    Comments: SEDATED ON VENT HEMODYNAMICALLY STABLE      
-3

## 2025-05-28 ENCOUNTER — PATIENT ROUNDING (BHMG ONLY) (OUTPATIENT)
Dept: URGENT CARE | Facility: CLINIC | Age: 65
End: 2025-05-28
Payer: OTHER GOVERNMENT

## 2025-05-28 NOTE — ED NOTES
Thank you for letting us care for you in your recent visit to our urgent care center. We would love to hear about your experience with us. Was this the first time you have visited our location?    We’re always looking for ways to make our patients’ experiences even better. Do you have any recommendations on ways we may improve?     I appreciate you taking the time to respond. Please be on the lookout for a survey about your recent visit from [email or web address] via text or email. We would greatly appreciate if you could fill that out and turn it back in. We want your voice to be heard and we value your feedback.   Thank you for choosing [facility] for your healthcare needs.     Gabrielle Practice Manager

## (undated) DEVICE — INTEGUSEAL MICROBIAL SEALANT: Brand: AVANOS

## (undated) DEVICE — CABL BIPOL W/ALLGTR CLIP/SM 12FT

## (undated) DEVICE — TEMP PACING WIRE: Brand: MYO/WIRE

## (undated) DEVICE — BG BLD SYS

## (undated) DEVICE — NEEDLE, QUINCKE, 18GX3.5": Brand: MEDLINE

## (undated) DEVICE — CATH DIAG IMPULSE FL4 6F 100CM

## (undated) DEVICE — CANN RETRGR STYLET RSCP 15F

## (undated) DEVICE — ADHS SKIN PREMIERPRO EXOFIN TOPICAL HI/VISC .5ML

## (undated) DEVICE — IRRIGATOR BULB ASEPTO 60CC STRL

## (undated) DEVICE — KT PT POSITION SUPINE HANA/PROFX TABL

## (undated) DEVICE — GLV SURG SIGNATURE ESSENTIAL PF LTX SZ8.5

## (undated) DEVICE — GLV SURG SENSICARE PI LF PF 8 GRN STRL

## (undated) DEVICE — SOL NACL 0.9PCT 1000ML

## (undated) DEVICE — TOWEL,OR,DSP,ST,WHITE,DLX,4/PK,20PK/CS: Brand: MEDLINE

## (undated) DEVICE — SUT PROLN 4/0 V5 36IN 8935H

## (undated) DEVICE — PK PERFUS CUST W/CARDIOPLEGIA

## (undated) DEVICE — SUT SILK 4/0 TIES 18IN A183H

## (undated) DEVICE — GLV SURG SENSICARE PI MIC PF SZ7.5 LF STRL

## (undated) DEVICE — Device

## (undated) DEVICE — TOWEL,OR,DSP,ST,BLUE,STD,4/PK,20PK/CS: Brand: MEDLINE

## (undated) DEVICE — BLD SCLPL BEAVR MINI STR 2BVL 180D LF

## (undated) DEVICE — BLOWER MISTER CLEARVIEW W/TBG

## (undated) DEVICE — VASOVIEW HEMOPRO: Brand: VASOVIEW HEMOPRO

## (undated) DEVICE — SUT PROLN 7/0 BV1 D/A 30IN 8703H

## (undated) DEVICE — SENSR CERBRL O2 PK/2

## (undated) DEVICE — SPNG LAP PREWSH SFTPK 18X18IN STRL PK/5

## (undated) DEVICE — ANTIBACTERIAL UNDYED BRAIDED (POLYGLACTIN 910), SYNTHETIC ABSORBABLE SUTURE: Brand: COATED VICRYL

## (undated) DEVICE — 28 FR STRAIGHT – SOFT PVC CATHETER: Brand: PVC THORACIC CATHETERS

## (undated) DEVICE — SUT SILK 0 CT1 CR8 18IN C021D

## (undated) DEVICE — CANN AORT ROOT DLP VNT/8IN 14G 7F

## (undated) DEVICE — GOWN,REINFORCE,POLY,SIRUS,BREATH SLV,LG: Brand: MEDLINE

## (undated) DEVICE — PK TOTL HIP 50

## (undated) DEVICE — CATH DIAG IMPULSE FR4 6F 100CM

## (undated) DEVICE — SOLUTION,WATER,IRRIGATION,1000ML,STERILE: Brand: MEDLINE

## (undated) DEVICE — CATH DIAG IMPULSE PIG .056 6F 110CM

## (undated) DEVICE — PINNACLE INTRODUCER SHEATH: Brand: PINNACLE

## (undated) DEVICE — SYR LUERLOK 30CC

## (undated) DEVICE — SOL IRRIG NACL 9PCT 1000ML BTL

## (undated) DEVICE — ELECTRD DEFIB M/FUNC PROPADZ STRL 2PK

## (undated) DEVICE — KT SURG TURNOVER 050

## (undated) DEVICE — SOL IRRIG H2O 1000ML STRL

## (undated) DEVICE — SCANLAN® VASCU-STATT® II SINGLE-USE BULLDOG CLAMP W/FIRMER CLAMPING PRESS - MIDI ANGLED 45° (YELLOW), CLAMPING PRESSURE 75-80 G (2/STERILE PKG): Brand: SCANLAN® VASCU-STATT® II SINGLE-USE BULLDOG CLAMP W/FIRMER CLAMPING PRESS

## (undated) DEVICE — SUT SILK 2/0 TIES 18IN A185H

## (undated) DEVICE — PK ATS CUST W CARDIOTOMY RESEVOIR

## (undated) DEVICE — PICO 7 10CM X 30CM: Brand: PICO™ 7

## (undated) DEVICE — ROTATING SURGICAL PUNCHES, 1 PER POUCH: Brand: A&E MEDICAL / ROTATING SURGICAL PUNCHES

## (undated) DEVICE — ZIPPERED TOGA, 2X LARGE: Brand: FLYTE

## (undated) DEVICE — PK OPN HEART WHT WRP 50

## (undated) DEVICE — OCEAN DRAIN, SINGLE, IN-LINE, STOP: Brand: OCEAN

## (undated) DEVICE — NDL PERC 1PRT THNWALL W/BASEPLT 18G 7CM

## (undated) DEVICE — SUT PROLENE PP 7/0 BV175-6 24IN

## (undated) DEVICE — GW PTFE EMERALD HEPCOAT FC J TIP STD .035 3MM 150CM

## (undated) DEVICE — CONNECT Y INTERSEPT W/LL 3/8 X 3/8 X 3/8IN

## (undated) DEVICE — SUT PDS 0 CT-1 Z340H

## (undated) DEVICE — CELLERATERX SURGICAL HYDROLYZED COLLAGEN 1G TYPE I BOVINE COLLAGEN FOR CHRONIC AND ACUTE WOUNDS, PARTIAL AND FULL THICKNESS WOUNDS, PRESSURE INJURIES I-IV, VENOUS STASIS ULCERS, ARTERIAL ULCERS, DIABETIC ULCERS, TRAUMATIC WOUNDS, FIRST AND SECOND DEGREE BURNS, SUPERFICIAL WOUNDS AND SURGICAL WOUNDS. STERILE UNTIL OPENED.  STORE AT ROOM TEMPERATURE. FOR USE ON MODERATELY TO HEAVILY EXUDATIVE WOUNDS. CLEANSE THE WOUND PER FACILITY PROTOCOL.  APPLY CELLERATERX POWDER OVER THE ENTIRE WOUND BED AND THE EDGES OF THE WOUND.  COVER WITH AN APPROPRIATE DRESSING.  REAPPLY 2-3 TIMES A WEEK, OR WITH EACH DRESSING CHANGE, TAKING CARE NOT TO DISRUPT WOUND SITE. HTTPS://SANARAMEDTECH.COM/SURGICAL/CELLERATERX-SURGICAL/: Brand: CELLERATERX SURGICAL

## (undated) DEVICE — HEMOCONCENTRATOR PERFUS LPS06

## (undated) DEVICE — SOL IRR NACL 0.9PCT ARTHROMATIC 3000ML

## (undated) DEVICE — SKIN AFFIX SURG ADHESIVE 72/CS 0.55ML: Brand: MEDLINE

## (undated) DEVICE — INSUFFLATION TUBING,LAPAROSCOPIC: Brand: DEROYAL

## (undated) DEVICE — ELECTRD BLD EZ CLN STD 6.5IN

## (undated) DEVICE — SUCTION CANISTER, 1500CC,SAFELINER: Brand: DEROYAL

## (undated) DEVICE — CORONARY ARTERY BYPASS GRAFT MARKERS, STAINLESS STEEL, DISTAL, WITHOUT HOLDER: Brand: ANASTOMARK CORONARY ARTERY BYPASS GRAFT MARKERS, STAINLESS STEEL, DISTAL

## (undated) DEVICE — CVR HNDL LT SURG ACCSSRY BLU STRL

## (undated) DEVICE — DRAPE,U/ SHT,SPLIT,PLAS,STERIL: Brand: MEDLINE

## (undated) DEVICE — SUT PROLN 6/0 RB2 D/A 30IN 8711H

## (undated) DEVICE — PLASMABLADE PS210-030S 3.0S LOCK: Brand: PLASMABLADE™

## (undated) DEVICE — TUBING, SUCTION, 1/4" X 12', STRAIGHT: Brand: MEDLINE

## (undated) DEVICE — STPCK 3WY W 14IN PRESS LN

## (undated) DEVICE — SUT PROLN 5/0 V5 36IN 8934H

## (undated) DEVICE — SUT PROLN 4/0 V7 36IN 8975H

## (undated) DEVICE — BIPOLAR SEALER 23-112-1 AQM 6.0: Brand: AQUAMANTYS™

## (undated) DEVICE — DUAL CUT SAGITTAL BLADE

## (undated) DEVICE — ST PERFUS M/

## (undated) DEVICE — GLV SURG SENSICARE PI ORTHO SZ8.5 LF STRL

## (undated) DEVICE — CATH IV INSYTE AUTOGARD SHLD 22G 1IN BK

## (undated) DEVICE — GLV SURG BIOGEL LTX PF 8

## (undated) DEVICE — ACCESSRAIL PLATFORM (STANDARD BLADE): Brand: ACCESSRAIL PLATFORM (STANDARD BLADE)

## (undated) DEVICE — SUT SILK 2 SUTUPAK TIE 60IN SA8H 2STRAND

## (undated) DEVICE — SUT PROLN 3/0 V7 D/A 36IN 8976H

## (undated) DEVICE — C-ARM: Brand: UNBRANDED

## (undated) DEVICE — PK TRY HEART CATH 50

## (undated) DEVICE — DRAPE,C-SECTION,CLR SCREEN,WIRE: Brand: MEDLINE

## (undated) DEVICE — SEALANT HEMO SURG COSEAL PREMIX 8ML

## (undated) DEVICE — GAUZE,SPONGE,4"X4",32PLY,XRAY,STRL,LF: Brand: MEDLINE